# Patient Record
Sex: FEMALE | Race: WHITE | Employment: UNEMPLOYED | URBAN - METROPOLITAN AREA
[De-identification: names, ages, dates, MRNs, and addresses within clinical notes are randomized per-mention and may not be internally consistent; named-entity substitution may affect disease eponyms.]

---

## 2017-01-03 RX ORDER — GABAPENTIN 300 MG/1
CAPSULE ORAL
Qty: 270 CAP | Refills: 0 | Status: SHIPPED | OUTPATIENT
Start: 2017-01-03 | End: 2017-03-20 | Stop reason: SDUPTHER

## 2017-01-18 DIAGNOSIS — F98.8 ADD (ATTENTION DEFICIT DISORDER): ICD-10-CM

## 2017-01-18 DIAGNOSIS — G35 MS (MULTIPLE SCLEROSIS) (HCC): ICD-10-CM

## 2017-01-18 DIAGNOSIS — G89.4 CHRONIC PAIN SYNDROME: ICD-10-CM

## 2017-01-18 RX ORDER — OXYCODONE HYDROCHLORIDE 10 MG/1
10 TABLET ORAL
Qty: 60 TAB | Refills: 0 | Status: SHIPPED | OUTPATIENT
Start: 2017-01-18 | End: 2017-02-20 | Stop reason: SDUPTHER

## 2017-01-18 RX ORDER — DEXTROAMPHETAMINE SACCHARATE, AMPHETAMINE ASPARTATE, DEXTROAMPHETAMINE SULFATE AND AMPHETAMINE SULFATE 2.5; 2.5; 2.5; 2.5 MG/1; MG/1; MG/1; MG/1
10 TABLET ORAL DAILY
Qty: 30 TAB | Refills: 0 | Status: SHIPPED | OUTPATIENT
Start: 2017-01-18 | End: 2017-02-20 | Stop reason: SDUPTHER

## 2017-01-18 NOTE — TELEPHONE ENCOUNTER
Pt wants to talk to you about her RX. Pt did not give more information just say ''l want to talk with nurse\". Please.

## 2017-02-16 DIAGNOSIS — G35 MS (MULTIPLE SCLEROSIS) (HCC): ICD-10-CM

## 2017-02-16 DIAGNOSIS — F98.8 ADD (ATTENTION DEFICIT DISORDER): ICD-10-CM

## 2017-02-16 DIAGNOSIS — G89.4 CHRONIC PAIN SYNDROME: ICD-10-CM

## 2017-02-20 RX ORDER — OXYCODONE HYDROCHLORIDE 10 MG/1
10 TABLET ORAL
Qty: 60 TAB | Refills: 0 | OUTPATIENT
Start: 2017-02-20

## 2017-02-20 RX ORDER — DEXTROAMPHETAMINE SACCHARATE, AMPHETAMINE ASPARTATE, DEXTROAMPHETAMINE SULFATE AND AMPHETAMINE SULFATE 2.5; 2.5; 2.5; 2.5 MG/1; MG/1; MG/1; MG/1
10 TABLET ORAL DAILY
Qty: 30 TAB | Refills: 0 | OUTPATIENT
Start: 2017-02-20

## 2017-02-20 RX ORDER — OXYCODONE HYDROCHLORIDE 10 MG/1
10 TABLET ORAL
Qty: 60 TAB | Refills: 0 | Status: SHIPPED | OUTPATIENT
Start: 2017-02-20 | End: 2017-03-20 | Stop reason: SDUPTHER

## 2017-02-20 RX ORDER — DEXTROAMPHETAMINE SACCHARATE, AMPHETAMINE ASPARTATE, DEXTROAMPHETAMINE SULFATE AND AMPHETAMINE SULFATE 2.5; 2.5; 2.5; 2.5 MG/1; MG/1; MG/1; MG/1
10 TABLET ORAL DAILY
Qty: 30 TAB | Refills: 0 | Status: SHIPPED | OUTPATIENT
Start: 2017-02-20 | End: 2017-03-20 | Stop reason: SDUPTHER

## 2017-02-20 NOTE — TELEPHONE ENCOUNTER
Pt would like to have call back from you please retarding to her prescription for her med ( [address]) please [phone] .  Thank you

## 2017-02-21 RX ORDER — NITROFURANTOIN 25; 75 MG/1; MG/1
100 CAPSULE ORAL 2 TIMES DAILY
Qty: 14 CAP | Refills: 0 | Status: SHIPPED | OUTPATIENT
Start: 2017-02-21 | End: 2017-02-28

## 2017-03-20 ENCOUNTER — OFFICE VISIT (OUTPATIENT)
Dept: NEUROLOGY | Age: 52
End: 2017-03-20

## 2017-03-20 VITALS
SYSTOLIC BLOOD PRESSURE: 134 MMHG | OXYGEN SATURATION: 99 % | DIASTOLIC BLOOD PRESSURE: 74 MMHG | RESPIRATION RATE: 20 BRPM | HEART RATE: 90 BPM | BODY MASS INDEX: 20.18 KG/M2 | HEIGHT: 66 IN | WEIGHT: 125.6 LBS

## 2017-03-20 DIAGNOSIS — F98.8 ADD (ATTENTION DEFICIT DISORDER): ICD-10-CM

## 2017-03-20 DIAGNOSIS — G35 MS (MULTIPLE SCLEROSIS) (HCC): Primary | ICD-10-CM

## 2017-03-20 DIAGNOSIS — G89.4 CHRONIC PAIN SYNDROME: ICD-10-CM

## 2017-03-20 RX ORDER — OXYCODONE HYDROCHLORIDE 10 MG/1
10 TABLET ORAL
Qty: 60 TAB | Refills: 0 | Status: SHIPPED | OUTPATIENT
Start: 2017-03-20 | End: 2017-04-19 | Stop reason: SDUPTHER

## 2017-03-20 RX ORDER — DEXTROAMPHETAMINE SACCHARATE, AMPHETAMINE ASPARTATE, DEXTROAMPHETAMINE SULFATE AND AMPHETAMINE SULFATE 2.5; 2.5; 2.5; 2.5 MG/1; MG/1; MG/1; MG/1
10 TABLET ORAL 2 TIMES DAILY
Qty: 60 TAB | Refills: 0 | Status: SHIPPED | OUTPATIENT
Start: 2017-03-20 | End: 2017-04-19 | Stop reason: SDUPTHER

## 2017-03-20 RX ORDER — GABAPENTIN 300 MG/1
600 CAPSULE ORAL 3 TIMES DAILY
Qty: 360 CAP | Refills: 2 | Status: SHIPPED | OUTPATIENT
Start: 2017-03-20 | End: 2017-03-20 | Stop reason: SDUPTHER

## 2017-03-20 RX ORDER — AMITRIPTYLINE HYDROCHLORIDE 100 MG/1
100 TABLET, FILM COATED ORAL
Qty: 30 TAB | Refills: 3 | Status: SHIPPED | OUTPATIENT
Start: 2017-03-20 | End: 2018-05-02

## 2017-03-20 RX ORDER — GABAPENTIN 300 MG/1
600 CAPSULE ORAL 3 TIMES DAILY
Qty: 360 CAP | Refills: 2 | Status: SHIPPED | OUTPATIENT
Start: 2017-03-20 | End: 2018-05-02 | Stop reason: SDUPTHER

## 2017-03-20 NOTE — PROGRESS NOTES
Still doing well on the Adderrall- wants to know if she could take it later on in the afternoon   MS- blurred and doubled vision in her left eye, she knows it is related to stress since she moved in with her sister and her  to help manager her since she does have very advanced dementia and needed help

## 2017-03-20 NOTE — MR AVS SNAPSHOT
Visit Information Date & Time Provider Department Dept. Phone Encounter #  
 3/20/2017  3:30 PM Iris Cuevas NP Neurology [facility] [phone] [id number] Follow-up Instructions Return in about 3 months (around 6/20/2017). Upcoming Health Maintenance Date Due Pneumococcal 19-64 Medium Risk (1 of 1 - PPSV23) 3/1/1985 DTaP/Tdap/Td series (1 - Tdap) 3/1/1987 BREAST CANCER SCRN MAMMOGRAM 3/1/2016 FOBT Q 1 YEAR AGE 50-75 3/1/2016 PAP AKA CERVICAL CYTOLOGY 5/6/2016 INFLUENZA AGE 9 TO ADULT 8/1/2016 Allergies as of 3/20/2017  Review Complete On: 3/20/2017 By: Iris Cuevas NP No Known Allergies Current Immunizations  Reviewed on 6/13/2016 No immunizations on file. Not reviewed this visit You Were Diagnosed With   
  
 Codes Comments [facility]    -  Primary ICD-10-CM: G35 
ICD-9-CM: 896 ADD (attention deficit disorder)     ICD-10-CM: F98.8 ICD-9-CM: 314.00 Chronic pain syndrome     ICD-10-CM: G89.4 ICD-9-CM: 338. 4 Vitals BP Pulse Resp Height(growth percentile) Weight(growth percentile) SpO2  
 134/74 90 20 5' 6\" (1.676 m) 125 lb 9.6 oz (57 kg) 99% BMI OB Status Smoking Status 20.27 kg/m2 Having regular periods Current Every Day Smoker Vitals History BMI and BSA Data Body Mass Index Body Surface Area  
 20.27 kg/m 2 1.63 m 2 Preferred Pharmacy Pharmacy Name Phone [facility] [address], [address] [phone] Your Updated Medication List  
  
   
This list is accurate as of: 3/20/17  4:32 PM.  Always use your most recent med list.  
  
  
  
  
 amitriptyline 100 mg tablet Commonly known as:  ELAVIL Take 1 Tab by mouth nightly. dextroamphetamine-amphetamine 10 mg tablet Commonly known as:  ADDERALL Take 1 Tab (10 mg total) by mouth two (2) times a day.   Max Daily Amount: 20 mg  
  
 gabapentin 300 mg capsule Commonly known as:  NEURONTIN Take 2 Caps by mouth three (3) times daily. oxyCODONE IR 10 mg Tab immediate release tablet Commonly known as:  Van Rattler Take 1 Tab by mouth every four (4) hours as needed for Pain. Max Daily Amount: 60 mg.  
  
  
  
  
Prescriptions Printed Refills  
 dextroamphetamine-amphetamine (ADDERALL) 10 mg tablet 0 Sig: Take 1 Tab (10 mg total) by mouth two (2) times a day. Max Daily Amount: 20 mg  
 Class: Print Route: Oral  
 oxyCODONE IR (ROXICODONE) 10 mg tab immediate release tablet 0 Sig: Take 1 Tab by mouth every four (4) hours as needed for Pain. Max Daily Amount: 60 mg.  
 Class: Print Route: Oral  
 gabapentin (NEURONTIN) 300 mg capsule 2 Sig: Take 2 Caps by mouth three (3) times daily. Class: Print Route: Oral  
  
Prescriptions Sent to Pharmacy Refills  
 amitriptyline (ELAVIL) 100 mg tablet 3 Sig: Take 1 Tab by mouth nightly. Class: Normal  
 Pharmacy: [facility] [address], [address] #: [phone] Route: Oral  
  
Follow-up Instructions Return in about 3 months (around 6/20/2017). Patient Instructions A Healthy Lifestyle: Care Instructions Your Care Instructions A healthy lifestyle can help you feel good, stay at a healthy weight, and have plenty of energy for both work and play. A healthy lifestyle is something you can share with your whole family. A healthy lifestyle also can lower your risk for serious health problems, such as high blood pressure, heart disease, and diabetes. You can follow a few steps listed below to improve your health and the health of your family. Follow-up care is a key part of your treatment and safety. Be sure to make and go to all appointments, and call your doctor if you are having problems.  Its also a good idea to know your test results and keep a list of the medicines you take. How can you care for yourself at home? · Do not eat too much sugar, fat, or fast foods. You can still have dessert and treats now and then. The goal is moderation. · Start small to improve your eating habits. Pay attention to portion sizes, drink less juice and soda pop, and eat more fruits and vegetables. ¨ Eat a healthy amount of food. A 3-ounce serving of meat, for example, is about the size of a deck of cards. Fill the rest of your plate with vegetables and whole grains. ¨ Limit the amount of soda and sports drinks you have every day. Drink more water when you are thirsty. ¨ Eat at least 5 servings of fruits and vegetables every day. It may seem like a lot, but it is not hard to reach this goal. A serving or helping is 1 piece of fruit, 1 cup of vegetables, or 2 cups of leafy, raw vegetables. Have an apple or some carrot sticks as an afternoon snack instead of a candy bar. Try to have fruits and/or vegetables at every meal. 
· Make exercise part of your daily routine. You may want to start with simple activities, such as walking, bicycling, or slow swimming. Try to be active 30 to 60 minutes every day. You do not need to do all 30 to 60 minutes all at once. For example, you can exercise 3 times a day for 10 or 20 minutes. Moderate exercise is safe for most people, but it is always a good idea to talk to your doctor before starting an exercise program. 
· Keep moving. Sudha Knuckles the lawn, work in the garden, or [employer]. Take the stairs instead of the elevator at work. · If you smoke, quit. People who smoke have an increased risk for heart attack, stroke, cancer, and other lung illnesses. Quitting is hard, but there are ways to boost your chance of quitting tobacco for good. ¨ Use nicotine gum, patches, or lozenges. ¨ Ask your doctor about stop-smoking programs and medicines. ¨ Keep trying.  
In addition to reducing your risk of diseases in the future, you will notice some benefits soon after you stop using tobacco. If you have shortness of breath or asthma symptoms, they will likely get better within a few weeks after you quit. · Limit how much alcohol you drink. Moderate amounts of alcohol (up to 2 drinks a day for men, 1 drink a day for women) are okay. But drinking too much can lead to liver problems, high blood pressure, and other health problems. Family health If you have a family, there are many things you can do together to improve your health. · Eat meals together as a family as often as possible. · Eat healthy foods. This includes fruits, vegetables, lean meats and dairy, and whole grains. · Include your family in your fitness plan. Most people think of activities such as jogging or tennis as the way to fitness, but there are many ways you and your family can be more active. Anything that makes you breathe hard and gets your heart pumping is exercise. Here are some tips: 
¨ Walk to do errands or to take your child to school or the bus. ¨ Go for a family bike ride after dinner instead of watching TV. Where can you learn more? Go to http://meghanaCompufirstraul.info/. Enter Y693 in the search box to learn more about \"A Healthy Lifestyle: Care Instructions. \" Current as of: July 26, 2016 Content Version: 11.1 © 0716-4778 [employer]. Care instructions adapted under license by [employer] (which disclaims liability or warranty for this information). If you have questions about a medical condition or this instruction, always ask your healthcare professional. [place] [address] any warranty or liability for your use of this information. Introducing [facility]! Dear Deisy Valenzuela: 
Thank you for requesting a Meet My Friends account. Our records indicate that you already have an active Meet My Friends account. You can access your account anytime at [email or web address]. [email or web address] Did you know that you can access your hospital and ER discharge instructions at any time in KaChing!? You can also review all of your test results from your hospital stay or ER visit. Additional Information If you have questions, please visit the Frequently Asked Questions section of the KaChing! website at [email or web address] "[email or web address]"/[email or web address] [email or web address] Remember, KaChing! is NOT to be used for urgent needs. For medical emergencies, dial 911. Now available from your iPhone and Android! Please provide this summary of care documentation to your next provider. Your primary care clinician is listed as NONE. If you have any questions after today's visit, please call [phone].

## 2017-03-20 NOTE — PATIENT INSTRUCTIONS

## 2017-03-20 NOTE — PROGRESS NOTES
Date:  2017    Name:  Jessica Cruz  :  1965  MRN:  [id number]     PCP:  None    Chief Complaint   Patient presents with    Neurologic Problem     ADD/MS     HISTORY OF PRESENT ILLNESS: Follow up [facility]. Needs to be set up for her second Lemtrada infusion. Adderall is doing well and her daughter has really noticed a difference. She definitely notices a difference. Started to have some blurred and double vision which seemed to start yesterday. Feels like she might be in a flair but not sure if this just secondary to stress and fatigue. Current Outpatient Prescriptions   Medication Sig    dextroamphetamine-amphetamine (ADDERALL) 10 mg tablet Take 1 Tab (10 mg total) by mouth dailyEarliest Fill Date: 17. Max Daily Amount: 10 mg    oxyCODONE IR (ROXICODONE) 10 mg tab immediate release tablet Take 1 Tab by mouth every four (4) hours as needed for Pain. Max Daily Amount: 60 mg.    gabapentin (NEURONTIN) 300 mg capsule TAKE THREE CAPSULES BY MOUTH THREE TIMES A DAY    amitriptyline (ELAVIL) 100 mg tablet Take 1 Tab by mouth nightly. No current facility-administered medications for this visit. No Known Allergies  Past Medical History:   Diagnosis Date    Delivery normal     x2    Fibromyalgia     Headache(784.0)     Migraine headache     Multiple sclerosis (HCC)     Sarcoidosis (HCC)     Sun-damaged skin     Tanning bed exposure      Past Surgical History:   Procedure Laterality Date    ABDOMEN SURGERY PROC UNLISTED      galbladder removal    HX CHOLECYSTECTOMY      HX GYN      tubal ligation    HX GYN      tubial     HX OTHER SURGICAL      tubal ligation    HX TUBAL LIGATION       Social History     Social History    Marital status: SINGLE     Spouse name: N/A    Number of children: N/A    Years of education: N/A     Occupational History    Not on file.      Social History Main Topics    Smoking status: Current Every Day Smoker     Packs/day: 1.00 Years: 30.00     Types: Cigarettes    Smokeless tobacco: Never Used    Alcohol use No    Drug use: Yes     Special: Marijuana    Sexual activity: Yes     Partners: Male     Birth control/ protection: Surgical     Other Topics Concern    Not on file     Social History Narrative    ** Merged History Encounter **          Family History   Problem Relation Age of Onset    Other Mother      Lencho ojeda's disease 62    Thyroid Disease Sister     Thyroid Disease Sister     Heart Disease Father     Cancer Father      lymphoma       PHYSICAL EXAMINATION:    Visit Vitals    /74    Pulse 90    Resp 20    Ht 5' 6\" (1.676 m)    Wt 57 kg (125 lb 9.6 oz)    SpO2 99%    BMI 20.27 kg/m2     General: Well defined, nourished, and groomed individual in no acute distress.    Neck: Supple, nontender, no bruits    Heart: Regular rate and rhythm    Lungs: Clear to auscultation bilaterally    Musculoskeletal: Extremities revealed no edema and had full range of motion of joints. There is some pain with palpation with at the greater trochanteric bursa bilaterally.    Psych: Good mood and bright affect    NEUROLOGICAL EXAMINATION:    Mental Status: Alert and oriented to person, place, and time with recent and remote memory intact.    Cranial Nerves:    II, III, IV, VI: Visual acuity grossly intact. Visual fields are normal.    Pupils are equal, round, and reactive to light and accommodation.    Extra-ocular movements are full and fluid. Fundoscopic exam was benign, no ptosis or nystagmus.    V-XII: Hearing is grossly intact. Facial features are symmetric, with normal sensation and strength. The palate rises symmetrically and the tongue protrudes midline. Sternocleidomastoids 5/5.    Motor Examination: Normal tone, bulk, very trace weakness noted in the left upper extremity  Coordination: No resting or intention tremor    Gait and Station: Steady while walking.  No muscle wasting or fasiculations noted.    Reflexes: DTRs 2+ throughout. ASSESSMENT AND PLAN    ICD-10-CM ICD-9-CM    1. MS (multiple sclerosis) ([facility]) G35 340 oxyCODONE IR (ROXICODONE) 10 mg tab immediate release tablet   2. ADD (attention deficit disorder) F98.8 314.00 dextroamphetamine-amphetamine (ADDERALL) 10 mg tablet   3. Chronic pain syndrome G89.4 338.4 oxyCODONE IR (ROXICODONE) 10 mg tab immediate release tablet      gabapentin (NEURONTIN) 300 mg capsule      amitriptyline (ELAVIL) 100 mg tablet     Overall doing well. While the recent development of blurred and double vision could be related to an exacerbation, she would like to see if this resolves on its own over the next few days. If it does not, then I would like to set her up with five days of IV solumedrol. No change in her therapy. Will try to have her second round of Lemtrada set up. Follow up in three months or sooner if needed. [facility]

## 2017-04-05 ENCOUNTER — TELEPHONE (OUTPATIENT)
Dept: NEUROLOGY | Age: 52
End: 2017-04-05

## 2017-04-12 ENCOUNTER — TELEPHONE (OUTPATIENT)
Dept: NEUROLOGY | Age: 52
End: 2017-04-12

## 2017-04-19 DIAGNOSIS — F98.8 ADD (ATTENTION DEFICIT DISORDER): ICD-10-CM

## 2017-04-19 DIAGNOSIS — G35 MS (MULTIPLE SCLEROSIS) (HCC): ICD-10-CM

## 2017-04-19 DIAGNOSIS — G89.4 CHRONIC PAIN SYNDROME: ICD-10-CM

## 2017-04-19 RX ORDER — OXYCODONE HYDROCHLORIDE 10 MG/1
10 TABLET ORAL
Qty: 60 TAB | Refills: 0 | Status: SHIPPED | OUTPATIENT
Start: 2017-04-19 | End: 2017-05-17 | Stop reason: SDUPTHER

## 2017-04-19 RX ORDER — DEXTROAMPHETAMINE SACCHARATE, AMPHETAMINE ASPARTATE, DEXTROAMPHETAMINE SULFATE AND AMPHETAMINE SULFATE 2.5; 2.5; 2.5; 2.5 MG/1; MG/1; MG/1; MG/1
10 TABLET ORAL 2 TIMES DAILY
Qty: 60 TAB | Refills: 0 | Status: SHIPPED | OUTPATIENT
Start: 2017-04-19 | End: 2017-05-17 | Stop reason: SDUPTHER

## 2017-04-19 NOTE — TELEPHONE ENCOUNTER
----- Message from Boaz Gallego sent at 4/18/2017  4:36 PM EDT -----  Regarding: NP Marvin/telephone  The patient is requesting a call back from the NP in regards to lemtrada treatment.  [phone]

## 2017-04-27 ENCOUNTER — TELEPHONE (OUTPATIENT)
Dept: NEUROLOGY | Age: 52
End: 2017-04-27

## 2017-04-27 NOTE — TELEPHONE ENCOUNTER
Kim with [facility] called and she said the base line lab form is needed today or first thing tomorrow morning.

## 2017-05-01 RX ORDER — SODIUM CHLORIDE 9 MG/ML
25 INJECTION, SOLUTION INTRAVENOUS CONTINUOUS
Status: DISPENSED | OUTPATIENT
Start: 2017-05-03 | End: 2017-05-03

## 2017-05-01 RX ORDER — ACETAMINOPHEN 325 MG/1
975 TABLET ORAL ONCE
Status: COMPLETED | OUTPATIENT
Start: 2017-05-03 | End: 2017-05-03

## 2017-05-02 RX ORDER — ACETAMINOPHEN 325 MG/1
975 TABLET ORAL ONCE
Status: COMPLETED | OUTPATIENT
Start: 2017-05-04 | End: 2017-05-04

## 2017-05-02 RX ORDER — SODIUM CHLORIDE 9 MG/ML
25 INJECTION, SOLUTION INTRAVENOUS CONTINUOUS
Status: DISPENSED | OUTPATIENT
Start: 2017-05-04 | End: 2017-05-04

## 2017-05-03 ENCOUNTER — HOSPITAL ENCOUNTER (OUTPATIENT)
Dept: INFUSION THERAPY | Age: 52
Discharge: HOME | End: 2017-05-03
Payer: SELF-PAY

## 2017-05-03 VITALS
TEMPERATURE: 96 F | DIASTOLIC BLOOD PRESSURE: 57 MMHG | HEART RATE: 77 BPM | SYSTOLIC BLOOD PRESSURE: 94 MMHG | RESPIRATION RATE: 16 BRPM

## 2017-05-03 PROCEDURE — 74011250636 HC RX REV CODE- 250/636: Performed by: NURSE PRACTITIONER

## 2017-05-03 PROCEDURE — 96367 TX/PROPH/DG ADDL SEQ IV INF: CPT

## 2017-05-03 PROCEDURE — 74011250637 HC RX REV CODE- 250/637: Performed by: NURSE PRACTITIONER

## 2017-05-03 PROCEDURE — 96415 CHEMO IV INFUSION ADDL HR: CPT

## 2017-05-03 PROCEDURE — 74011000258 HC RX REV CODE- 258: Performed by: NURSE PRACTITIONER

## 2017-05-03 PROCEDURE — 96375 TX/PRO/DX INJ NEW DRUG ADDON: CPT

## 2017-05-03 PROCEDURE — 96413 CHEMO IV INFUSION 1 HR: CPT

## 2017-05-03 RX ORDER — ACETAMINOPHEN 325 MG/1
975 TABLET ORAL ONCE
Status: COMPLETED | OUTPATIENT
Start: 2017-05-05 | End: 2017-05-05

## 2017-05-03 RX ORDER — SODIUM CHLORIDE 0.9 % (FLUSH) 0.9 %
10-40 SYRINGE (ML) INJECTION AS NEEDED
Status: ACTIVE | OUTPATIENT
Start: 2017-05-03 | End: 2017-05-03

## 2017-05-03 RX ORDER — SODIUM CHLORIDE 9 MG/ML
25 INJECTION, SOLUTION INTRAVENOUS CONTINUOUS
Status: DISPENSED | OUTPATIENT
Start: 2017-05-05 | End: 2017-05-05

## 2017-05-03 RX ADMIN — ACETAMINOPHEN 975 MG: 325 TABLET ORAL at 08:32

## 2017-05-03 RX ADMIN — DIPHENHYDRAMINE HYDROCHLORIDE 50 MG: 50 INJECTION INTRAMUSCULAR; INTRAVENOUS at 09:19

## 2017-05-03 RX ADMIN — SODIUM CHLORIDE 25 ML/HR: 900 INJECTION, SOLUTION INTRAVENOUS at 08:28

## 2017-05-03 RX ADMIN — SODIUM CHLORIDE 1000 MG: 900 INJECTION, SOLUTION INTRAVENOUS at 08:36

## 2017-05-03 NOTE — PROGRESS NOTES
Outpatient Infusion Center - Chemotherapy Progress Note    0815 Pt admit to [facility] for Freddy Nash Day 1 ambulatory in stable condition with supportive daughter. Assessment completed. No new concerns voiced. PIV started in left arm with positive blood return. Line flushed, pt connected to IV fluids at [facility]. Premeds given. Visit Vitals    BP 94/57    Pulse 77    Temp 96 °F (35.6 °C)    Resp 16    Breastfeeding No       Medications:  NS @ KVO  Tylenol 975 mg PO  Solu-medrol 1000 mg IVPB  Diphenhydramine 50 mg IVPB  Lemtrada           1550 Pt tolerated treatment well. PIV maintained positive blood return throughout treatment, flushed with positive blood return at conclusion and wrapped in Coban with Curos cap applied to end clave for tomorrow's appointment. D/c home ambulatory in no distress. Pt aware of next [facility] appointment scheduled for 5/4/17.

## 2017-05-04 ENCOUNTER — HOSPITAL ENCOUNTER (OUTPATIENT)
Dept: INFUSION THERAPY | Age: 52
Discharge: HOME | End: 2017-05-04
Payer: SELF-PAY

## 2017-05-04 VITALS
SYSTOLIC BLOOD PRESSURE: 136 MMHG | DIASTOLIC BLOOD PRESSURE: 80 MMHG | TEMPERATURE: 97.5 F | OXYGEN SATURATION: 100 % | HEART RATE: 56 BPM | RESPIRATION RATE: 16 BRPM

## 2017-05-04 PROCEDURE — 96415 CHEMO IV INFUSION ADDL HR: CPT

## 2017-05-04 PROCEDURE — 96375 TX/PRO/DX INJ NEW DRUG ADDON: CPT

## 2017-05-04 PROCEDURE — 74011250637 HC RX REV CODE- 250/637: Performed by: NURSE PRACTITIONER

## 2017-05-04 PROCEDURE — 74011000258 HC RX REV CODE- 258: Performed by: NURSE PRACTITIONER

## 2017-05-04 PROCEDURE — 74011250636 HC RX REV CODE- 250/636: Performed by: NURSE PRACTITIONER

## 2017-05-04 PROCEDURE — 96413 CHEMO IV INFUSION 1 HR: CPT

## 2017-05-04 PROCEDURE — 96367 TX/PROPH/DG ADDL SEQ IV INF: CPT

## 2017-05-04 RX ORDER — SODIUM CHLORIDE 0.9 % (FLUSH) 0.9 %
10-40 SYRINGE (ML) INJECTION AS NEEDED
Status: ACTIVE | OUTPATIENT
Start: 2017-05-04 | End: 2017-05-05

## 2017-05-04 RX ORDER — SODIUM CHLORIDE 9 MG/ML
25 INJECTION, SOLUTION INTRAVENOUS AS NEEDED
Status: DISPENSED | OUTPATIENT
Start: 2017-05-04 | End: 2017-05-05

## 2017-05-04 RX ADMIN — ACETAMINOPHEN 975 MG: 325 TABLET ORAL at 08:32

## 2017-05-04 RX ADMIN — SODIUM CHLORIDE 25 ML/HR: 900 INJECTION, SOLUTION INTRAVENOUS at 08:30

## 2017-05-04 RX ADMIN — DIPHENHYDRAMINE HYDROCHLORIDE 50 MG: 50 INJECTION INTRAMUSCULAR; INTRAVENOUS at 09:23

## 2017-05-04 RX ADMIN — SODIUM CHLORIDE 1000 MG: 900 INJECTION, SOLUTION INTRAVENOUS at 08:36

## 2017-05-04 NOTE — PROGRESS NOTES
Outpatient Infusion Center - Chemotherapy Progress Note    0820 Pt admit to [facility] for Noemi Ferreira Day 2 ambulatory in stable condition with supportive daughter. Assessment completed. No new concerns voiced. PIV flushed in left arm with positive blood return, pt connected to IV fluids at [facility]. Premeds given. Visit Vitals    /65    Pulse 62    Temp 96.2 °F (35.7 °C)    Resp 16    SpO2 100%       Medications:  NS @ KVO  Tylenol 975 mg PO  Solu-medrol 1000 mg IVPB  Diphenhydramine 50 mg IVPB  Lemtrada           1530 Pt tolerated treatment well. Pt monitored post infusion for 2 hours without any s/s of reaction. PIV maintained positive blood return throughout treatment, flushed with positive blood return at conclusion and wrapped in Coban with Curos cap applied to end clave for tomorrow's appointment. D/c home ambulatory in no distress. Pt aware of next [facility] appointment scheduled for 5/5/17.

## 2017-05-05 ENCOUNTER — HOSPITAL ENCOUNTER (OUTPATIENT)
Dept: INFUSION THERAPY | Age: 52
Discharge: HOME | End: 2017-05-05
Payer: SELF-PAY

## 2017-05-05 VITALS
SYSTOLIC BLOOD PRESSURE: 140 MMHG | TEMPERATURE: 97.2 F | DIASTOLIC BLOOD PRESSURE: 75 MMHG | HEART RATE: 62 BPM | RESPIRATION RATE: 16 BRPM

## 2017-05-05 PROCEDURE — 96367 TX/PROPH/DG ADDL SEQ IV INF: CPT

## 2017-05-05 PROCEDURE — 96415 CHEMO IV INFUSION ADDL HR: CPT

## 2017-05-05 PROCEDURE — 74011250637 HC RX REV CODE- 250/637: Performed by: NURSE PRACTITIONER

## 2017-05-05 PROCEDURE — 74011000258 HC RX REV CODE- 258: Performed by: NURSE PRACTITIONER

## 2017-05-05 PROCEDURE — 96413 CHEMO IV INFUSION 1 HR: CPT

## 2017-05-05 PROCEDURE — 96375 TX/PRO/DX INJ NEW DRUG ADDON: CPT

## 2017-05-05 PROCEDURE — 74011250636 HC RX REV CODE- 250/636: Performed by: NURSE PRACTITIONER

## 2017-05-05 RX ADMIN — DIPHENHYDRAMINE HYDROCHLORIDE 50 MG: 50 INJECTION INTRAMUSCULAR; INTRAVENOUS at 09:16

## 2017-05-05 RX ADMIN — ACETAMINOPHEN 975 MG: 325 TABLET ORAL at 08:33

## 2017-05-05 RX ADMIN — SODIUM CHLORIDE 1000 MG: 900 INJECTION, SOLUTION INTRAVENOUS at 08:32

## 2017-05-05 RX ADMIN — SODIUM CHLORIDE 25 ML/HR: 900 INJECTION, SOLUTION INTRAVENOUS at 08:33

## 2017-05-05 NOTE — PROGRESS NOTES
Outpatient Infusion Center - Chemotherapy Progress Note    0825 [facility] in [facility] condition with supportive daughter. Assessment completed. No new concerns voiced. PIV flushed in left arm with positive blood return, pt connected to IV fluids at [facility]. Premeds given. Visit Vitals    /83    Pulse (!) 49    Temp 97.3 °F (36.3 °C)    Resp 16       Medications:  NS @ KVO  Tylenol 975 mg PO  Solu-medrol 1000 mg IVPB  Diphenhydramine 50 mg IVPB  Lemtrada           1530 Pt tolerated treatment well. Pt monitored post infusion for 2 hours without any s/s of reaction. PIV maintained positive blood return throughout treatment, flushed with positive blood return at conclusion and removed prior to discharge. D/c home ambulatory in no distress.

## 2017-05-11 ENCOUNTER — TELEPHONE (OUTPATIENT)
Dept: NEUROLOGY | Age: 52
End: 2017-05-11

## 2017-05-17 DIAGNOSIS — F98.8 ADD (ATTENTION DEFICIT DISORDER): ICD-10-CM

## 2017-05-17 DIAGNOSIS — G35 MS (MULTIPLE SCLEROSIS) (HCC): ICD-10-CM

## 2017-05-17 DIAGNOSIS — G89.4 CHRONIC PAIN SYNDROME: ICD-10-CM

## 2017-05-17 RX ORDER — DEXTROAMPHETAMINE SACCHARATE, AMPHETAMINE ASPARTATE, DEXTROAMPHETAMINE SULFATE AND AMPHETAMINE SULFATE 2.5; 2.5; 2.5; 2.5 MG/1; MG/1; MG/1; MG/1
10 TABLET ORAL 2 TIMES DAILY
Qty: 60 TAB | Refills: 0 | Status: SHIPPED | OUTPATIENT
Start: 2017-05-17 | End: 2017-06-14 | Stop reason: SDUPTHER

## 2017-05-17 RX ORDER — OXYCODONE HYDROCHLORIDE 10 MG/1
10 TABLET ORAL
Qty: 60 TAB | Refills: 0 | Status: SHIPPED | OUTPATIENT
Start: 2017-05-17 | End: 2017-06-14 | Stop reason: SDUPTHER

## 2017-05-17 NOTE — TELEPHONE ENCOUNTER
----- Message from Jena Watson sent at 5/17/2017  1:05 PM EDT -----  Regarding: BELLA Wong/Telephone   Pt would like a call back from Vini in regards to her medication. Pt best contact number is [phone].

## 2017-06-01 ENCOUNTER — HOSPITAL ENCOUNTER (EMERGENCY)
Age: 52
Discharge: HOME | End: 2017-06-02
Attending: EMERGENCY MEDICINE | Admitting: EMERGENCY MEDICINE
Payer: SELF-PAY

## 2017-06-01 DIAGNOSIS — M62.838 MUSCLE SPASM: ICD-10-CM

## 2017-06-01 DIAGNOSIS — G47.00 INSOMNIA, UNSPECIFIED TYPE: Primary | ICD-10-CM

## 2017-06-01 PROCEDURE — 85025 COMPLETE CBC W/AUTO DIFF WBC: CPT | Performed by: EMERGENCY MEDICINE

## 2017-06-01 PROCEDURE — 80053 COMPREHEN METABOLIC PANEL: CPT | Performed by: EMERGENCY MEDICINE

## 2017-06-01 PROCEDURE — 36415 COLL VENOUS BLD VENIPUNCTURE: CPT | Performed by: EMERGENCY MEDICINE

## 2017-06-01 PROCEDURE — 84443 ASSAY THYROID STIM HORMONE: CPT | Performed by: EMERGENCY MEDICINE

## 2017-06-01 PROCEDURE — 80307 DRUG TEST PRSMV CHEM ANLYZR: CPT | Performed by: EMERGENCY MEDICINE

## 2017-06-01 PROCEDURE — 99284 EMERGENCY DEPT VISIT MOD MDM: CPT

## 2017-06-01 PROCEDURE — 81001 URINALYSIS AUTO W/SCOPE: CPT | Performed by: EMERGENCY MEDICINE

## 2017-06-01 RX ORDER — DIAZEPAM 5 MG/1
5 TABLET ORAL
Status: COMPLETED | OUTPATIENT
Start: 2017-06-01 | End: 2017-06-02

## 2017-06-02 VITALS
SYSTOLIC BLOOD PRESSURE: 117 MMHG | HEART RATE: 73 BPM | HEIGHT: 66 IN | OXYGEN SATURATION: 97 % | BODY MASS INDEX: 19.42 KG/M2 | WEIGHT: 120.81 LBS | RESPIRATION RATE: 16 BRPM | DIASTOLIC BLOOD PRESSURE: 65 MMHG | TEMPERATURE: 98.1 F

## 2017-06-02 LAB
ALBUMIN SERPL BCP-MCNC: 4 G/DL (ref 3.5–5)
ALBUMIN/GLOB SERPL: 1.3 {RATIO} (ref 1.1–2.2)
ALP SERPL-CCNC: 108 U/L (ref 45–117)
ALT SERPL-CCNC: 17 U/L (ref 12–78)
AMPHET UR QL SCN: POSITIVE
ANION GAP BLD CALC-SCNC: 7 MMOL/L (ref 5–15)
APPEARANCE UR: CLEAR
AST SERPL W P-5'-P-CCNC: 11 U/L (ref 15–37)
BACTERIA URNS QL MICRO: NEGATIVE /HPF
BARBITURATES UR QL SCN: NEGATIVE
BASOPHILS # BLD AUTO: 0 K/UL (ref 0–0.1)
BASOPHILS # BLD: 0 % (ref 0–1)
BENZODIAZ UR QL: NEGATIVE
BILIRUB SERPL-MCNC: 0.3 MG/DL (ref 0.2–1)
BILIRUB UR QL: NEGATIVE
BUN SERPL-MCNC: 9 MG/DL (ref 6–20)
BUN/CREAT SERPL: 12 (ref 12–20)
CALCIUM SERPL-MCNC: 9 MG/DL (ref 8.5–10.1)
CANNABINOIDS UR QL SCN: POSITIVE
CHLORIDE SERPL-SCNC: 105 MMOL/L (ref 97–108)
CO2 SERPL-SCNC: 30 MMOL/L (ref 21–32)
COCAINE UR QL SCN: NEGATIVE
COLOR UR: ABNORMAL
CREAT SERPL-MCNC: 0.76 MG/DL (ref 0.55–1.02)
DIFFERENTIAL METHOD BLD: ABNORMAL
DRUG SCRN COMMENT,DRGCM: ABNORMAL
EOSINOPHIL # BLD: 0.2 K/UL (ref 0–0.4)
EOSINOPHIL NFR BLD: 3 % (ref 0–7)
EPITH CASTS URNS QL MICRO: ABNORMAL /LPF
ERYTHROCYTE [DISTWIDTH] IN BLOOD BY AUTOMATED COUNT: 18.1 % (ref 11.5–14.5)
GLOBULIN SER CALC-MCNC: 3.1 G/DL (ref 2–4)
GLUCOSE SERPL-MCNC: 90 MG/DL (ref 65–100)
GLUCOSE UR STRIP.AUTO-MCNC: NEGATIVE MG/DL
HCT VFR BLD AUTO: 37.5 % (ref 35–47)
HGB BLD-MCNC: 11.2 G/DL (ref 11.5–16)
HGB UR QL STRIP: NEGATIVE
KETONES UR QL STRIP.AUTO: NEGATIVE MG/DL
LEUKOCYTE ESTERASE UR QL STRIP.AUTO: ABNORMAL
LYMPHOCYTES # BLD AUTO: 2 % (ref 12–49)
LYMPHOCYTES # BLD: 0.1 K/UL (ref 0.8–3.5)
MCH RBC QN AUTO: 25.1 PG (ref 26–34)
MCHC RBC AUTO-ENTMCNC: 29.9 G/DL (ref 30–36.5)
MCV RBC AUTO: 83.9 FL (ref 80–99)
METHADONE UR QL: NEGATIVE
MONOCYTES # BLD: 0.6 K/UL (ref 0–1)
MONOCYTES NFR BLD AUTO: 10 % (ref 5–13)
NEUTS SEG # BLD: 5.5 K/UL (ref 1.8–8)
NEUTS SEG NFR BLD AUTO: 85 % (ref 32–75)
NITRITE UR QL STRIP.AUTO: NEGATIVE
OPIATES UR QL: NEGATIVE
PCP UR QL: NEGATIVE
PH UR STRIP: 6.5 [PH] (ref 5–8)
PLATELET # BLD AUTO: 238 K/UL (ref 150–400)
POTASSIUM SERPL-SCNC: 3.1 MMOL/L (ref 3.5–5.1)
PROT SERPL-MCNC: 7.1 G/DL (ref 6.4–8.2)
PROT UR STRIP-MCNC: NEGATIVE MG/DL
RBC # BLD AUTO: 4.47 M/UL (ref 3.8–5.2)
RBC #/AREA URNS HPF: ABNORMAL /HPF (ref 0–5)
RBC MORPH BLD: ABNORMAL
SODIUM SERPL-SCNC: 142 MMOL/L (ref 136–145)
SP GR UR REFRACTOMETRY: <1.005 (ref 1–1.03)
TSH SERPL DL<=0.05 MIU/L-ACNC: 1.66 UIU/ML (ref 0.36–3.74)
UROBILINOGEN UR QL STRIP.AUTO: 0.2 EU/DL (ref 0.2–1)
WBC # BLD AUTO: 6.4 K/UL (ref 3.6–11)
WBC URNS QL MICRO: ABNORMAL /HPF (ref 0–4)

## 2017-06-02 PROCEDURE — 74011250637 HC RX REV CODE- 250/637: Performed by: EMERGENCY MEDICINE

## 2017-06-02 RX ORDER — POTASSIUM CHLORIDE 750 MG/1
TABLET, FILM COATED, EXTENDED RELEASE ORAL
Status: DISCONTINUED
Start: 2017-06-02 | End: 2017-06-02 | Stop reason: HOSPADM

## 2017-06-02 RX ORDER — POTASSIUM CHLORIDE 750 MG/1
40 TABLET, FILM COATED, EXTENDED RELEASE ORAL
Status: COMPLETED | OUTPATIENT
Start: 2017-06-02 | End: 2017-06-02

## 2017-06-02 RX ORDER — DIAZEPAM 5 MG/1
5 TABLET ORAL
Qty: 3 TAB | Refills: 0 | Status: SHIPPED | OUTPATIENT
Start: 2017-06-02 | End: 2017-06-14

## 2017-06-02 RX ADMIN — DIAZEPAM 5 MG: 5 TABLET ORAL at 00:06

## 2017-06-02 RX ADMIN — POTASSIUM CHLORIDE 40 MEQ: 750 TABLET, FILM COATED, EXTENDED RELEASE ORAL at 01:16

## 2017-06-02 NOTE — ED NOTES
Ongoing plan of care discussed and pts questions addressed. Toileting offered and pt ambulatory to the restroom for voided specimen collection.

## 2017-06-02 NOTE — ED NOTES
The patient was discharged home by Dr. Tammy Fuchs and Vazquez Simeon rn in stable condition, accompanied by family. The patient is alert and oriented, is in no respiratory distress and has vital signs within normal limits . The patient's diagnosis, condition and treatment were explained to patient. The patient expressed understanding. Prescriptions given to pt. No work/school note given to pt. A discharge plan has been developed. A  was not involved in the process. Aftercare instructions were given to the patient. Pt's saline lock removed without complications. Family will transport pt home.

## 2017-06-02 NOTE — ED NOTES
Pt presents with complaint of insomia since Sunday. Pt has tried over the counter meds without relief. Pt has MS and has had a recent death in her family.

## 2017-06-02 NOTE — DISCHARGE INSTRUCTIONS
Insomnia: Care Instructions  Your Care Instructions  Insomnia is the inability to sleep well. It is a common problem for most people at some time. Insomnia may make it hard for you to get to sleep, stay asleep, or sleep as long as you need to. This can make you tired and grouchy during the day. It can also make you forgetful, less effective at work, and unhappy. Insomnia can be caused by conditions such as depression or anxiety. Pain can also affect your ability to sleep. When these problems are solved, the insomnia usually clears up. But sometimes bad sleep habits can cause insomnia. If insomnia is affecting your work or your enjoyment of life, you can take steps to improve your sleep. Follow-up care is a key part of your treatment and safety. Be sure to make and go to all appointments, and call your doctor if you are having problems. It's also a good idea to know your test results and keep a list of the medicines you take. How can you care for yourself at home? What to avoid  · Do not have drinks with caffeine, such as coffee or black tea, for 8 hours before bed. · Do not smoke or use other types of tobacco near bedtime. Nicotine is a stimulant and can keep you awake. · Avoid drinking alcohol late in the evening, because it can cause you to wake in the middle of the night. · Do not eat a big meal close to bedtime. If you are hungry, eat a light snack. · Do not drink a lot of water close to bedtime, because the need to urinate may wake you up during the night. · Do not read or watch TV in bed. Use the bed only for sleeping and sexual activity. What to try  · Go to bed at the same time every night, and wake up at the same time every morning. Do not take naps during the day. · Keep your bedroom quiet, dark, and cool. · Sleep on a comfortable pillow and mattress. · If watching the clock makes you anxious, turn it facing away from you so you cannot see the time.   · If you worry when you lie down, start a worry book. Well before bedtime, write down your worries, and then set the book and your concerns aside. · Try meditation or other relaxation techniques before you go to bed. · If you cannot fall asleep, get up and go to another room until you feel sleepy. Do something relaxing. Repeat your bedtime routine before you go to bed again. · Make your house quiet and calm about an hour before bedtime. Turn down the lights, turn off the TV, log off the computer, and turn down the volume on music. This can help you relax after a busy day. When should you call for help? Watch closely for changes in your health, and be sure to contact your doctor if:  · Your efforts to improve your sleep do not work. · Your insomnia gets worse. · You have been feeling down, depressed, or hopeless or have lost interest in things that you usually enjoy. Where can you learn more? Go to http://meghana-raul.info/. Enter P513 in the search box to learn more about \"Insomnia: Care Instructions. \"  Current as of: July 26, 2016  Content Version: 11.2  © 9415-2336 [employer]. Care instructions adapted under license by [facility] (which disclaims liability or warranty for this information). If you have questions about a medical condition or this instruction, always ask your healthcare professional. [address] any warranty or liability for your use of this information. We hope that we have addressed all of your medical concerns. The examination and treatment you received in the Emergency Department were for an emergent problem and were not intended as complete care. It is important that you follow up with your healthcare provider(s) for ongoing care. If your symptoms worsen or do not improve as expected, and you are unable to reach your usual health care provider(s), you should return to the Emergency Department.       Today's healthcare is undergoing tremendous change, and patient satisfaction surveys are one of the many tools to assess the quality of medical care. You may receive a survey from the [employer] regarding your experience in the Emergency Department. I hope that your experience has been completely positive, particularly the medical care that I provided. As such, please participate in the survey; anything less than excellent does not meet my expectations or intentions. [facility] and [facility] participate in nationally recognized quality of care measures. If your blood pressure is greater than 120/80, as reported below, we urge that you seek medical care to address the potential of high blood pressure, commonly known as hypertension. Hypertension can be hereditary or can be caused by certain medical conditions, pain, stress, or \"white coat syndrome. \"       Please make an appointment with your health care provider(s) for follow up of your Emergency Department visit. VITALS:   Patient Vitals for the past 8 hrs:   Temp Pulse Resp BP SpO2   06/02/17 0059 - 63 16 117/62 100 %   06/01/17 2250 98.1 °F (36.7 °C) 88 20 128/86 98 %          Thank you for allowing us to provide you with medical care today. We realize that you have many choices for your emergency care needs. Please choose us in the future for any continued health care needs. Cecilio Monson [facility], [facility].   Office: [phone]            Recent Results (from the past 24 hour(s))   CBC WITH AUTOMATED DIFF    Collection Time: 06/01/17 11:58 PM   Result Value Ref Range    WBC 6.4 3.6 - 11.0 K/uL    RBC 4.47 3.80 - 5.20 M/uL    HGB 11.2 (L) 11.5 - 16.0 g/dL    HCT 37.5 35.0 - 47.0 %    MCV 83.9 80.0 - 99.0 FL    MCH 25.1 (L) 26.0 - 34.0 PG    MCHC 29.9 (L) 30.0 - 36.5 g/dL    RDW 18.1 (H) 11.5 - 14.5 %    PLATELET 766 205 - 271 K/uL    NEUTROPHILS 85 (H) 32 - 75 %    LYMPHOCYTES 2 (L) 12 - 49 %    MONOCYTES 10 5 - 13 %    EOSINOPHILS 3 0 - 7 %    BASOPHILS 0 0 - 1 %    ABS. NEUTROPHILS 5.5 1.8 - 8.0 K/UL    ABS. LYMPHOCYTES 0.1 (L) 0.8 - 3.5 K/UL    ABS. MONOCYTES 0.6 0.0 - 1.0 K/UL    ABS. EOSINOPHILS 0.2 0.0 - 0.4 K/UL    ABS. BASOPHILS 0.0 0.0 - 0.1 K/UL    DF AUTOMATED      RBC COMMENTS HYPOCHROMIA  1+        RBC COMMENTS ANISOCYTOSIS  1+        RBC COMMENTS MICROCYTOSIS  1+        RBC COMMENTS POIKILOCYTOSIS  1+        RBC COMMENTS OVALOCYTES  PRESENT       METABOLIC PANEL, COMPREHENSIVE    Collection Time: 06/01/17 11:58 PM   Result Value Ref Range    Sodium 142 136 - 145 mmol/L    Potassium 3.1 (L) 3.5 - 5.1 mmol/L    Chloride 105 97 - 108 mmol/L    CO2 30 21 - 32 mmol/L    Anion gap 7 5 - 15 mmol/L    Glucose 90 65 - 100 mg/dL    BUN 9 6 - 20 MG/DL    Creatinine 0.76 0.55 - 1.02 MG/DL    BUN/Creatinine ratio 12 12 - 20      GFR est AA >60 >60 ml/min/1.73m2    GFR est non-AA >60 >60 ml/min/1.73m2    Calcium 9.0 8.5 - 10.1 MG/DL    Bilirubin, total 0.3 0.2 - 1.0 MG/DL    ALT (SGPT) 17 12 - 78 U/L    AST (SGOT) 11 (L) 15 - 37 U/L    Alk.  phosphatase 108 45 - 117 U/L    Protein, total 7.1 6.4 - 8.2 g/dL    Albumin 4.0 3.5 - 5.0 g/dL    Globulin 3.1 2.0 - 4.0 g/dL    A-G Ratio 1.3 1.1 - 2.2     URINALYSIS W/MICROSCOPIC    Collection Time: 06/01/17 11:58 PM   Result Value Ref Range    Color STRAW      Appearance CLEAR CLEAR      Specific gravity <1.005 1.003 - 1.030    pH (UA) 6.5 5.0 - 8.0      Protein NEGATIVE  NEG mg/dL    Glucose NEGATIVE  NEG mg/dL    Ketone NEGATIVE  NEG mg/dL    Bilirubin NEGATIVE  NEG      Blood NEGATIVE  NEG      Urobilinogen 0.2 0.2 - 1.0 EU/dL    Nitrites NEGATIVE  NEG      Leukocyte Esterase SMALL (A) NEG      WBC 5-10 0 - 4 /hpf    RBC 0-5 0 - 5 /hpf    Epithelial cells MODERATE (A) FEW /lpf    Bacteria NEGATIVE  NEG /hpf   DRUG SCREEN, URINE    Collection Time: 06/01/17 11:58 PM   Result Value Ref Range    AMPHETAMINE POSITIVE (A) NEG      BARBITURATES NEGATIVE  NEG      BENZODIAZEPINE NEGATIVE  NEG      COCAINE NEGATIVE  NEG      METHADONE NEGATIVE  NEG      OPIATES NEGATIVE  NEG      PCP(PHENCYCLIDINE) NEGATIVE  NEG      THC (TH-CANNABINOL) POSITIVE (A) NEG      Drug screen comment (NOTE)        No results found.

## 2017-06-02 NOTE — ED TRIAGE NOTES
Pt presents ambulatory to the treatment room; gait steady and unassisted. Pt rpts insomnia since Sunday night. Pt rpts 2-4 hrs Sunday. Recent stress with family member passing away and tremors from her M/S. Pt rpts current pain mgmt is not working.

## 2017-06-02 NOTE — ED PROVIDER NOTES
HPI Comments: Gladis Denis is a 48 yo F with history of MS and fibromyalgia who presents to the ED with insomnia for the past 4 days. She states that she has been under a lot of stress following the recent death of her sister and she believes that it is related to that. She states she has been having increased muscle pain and spasm that keeps her awake. She states that she does not currently have a PCP but she is followed by Mally Frost in her neurology office. She has an appointment in 3 weeks. She denies suicidal ideations. Past Medical History:   Diagnosis Date    Delivery normal     x2    Fibromyalgia     Headache     Migraine headache     Multiple sclerosis (HCC)     Sarcoidosis (HCC)     Sun-damaged skin     Tanning bed exposure        Past Surgical History:   Procedure Laterality Date    ABDOMEN SURGERY PROC UNLISTED      galbladder removal    HX CHOLECYSTECTOMY  1990    HX GYN      tubal ligation    HX GYN      tubial     HX OTHER SURGICAL      tubal ligation    HX TUBAL LIGATION  1994         Family History:   Problem Relation Age of Onset    Thyroid Disease Sister     Thyroid Disease Sister     Other Mother      Ean Boxer rusty's disease 62    Heart Disease Father     Cancer Father      lymphoma       Social History     Social History    Marital status: SINGLE     Spouse name: N/A    Number of children: N/A    Years of education: N/A     Occupational History    Not on file. Social History Main Topics    Smoking status: Current Every Day Smoker     Packs/day: 1.00     Years: 30.00     Types: Cigarettes    Smokeless tobacco: Never Used    Alcohol use No    Drug use: Yes     Special: Marijuana    Sexual activity: Yes     Partners: Male     Birth control/ protection: Surgical     Other Topics Concern    Not on file     Social History Narrative    ** Merged History Encounter **              ALLERGIES: Review of patient's allergies indicates no known allergies.     Review of Systems   Constitutional: Negative for fever. HENT: Negative for sore throat. Eyes: Negative for visual disturbance. Respiratory: Negative for cough. Cardiovascular: Negative for chest pain. Gastrointestinal: Negative for abdominal pain. Genitourinary: Negative for dysuria. Musculoskeletal: Positive for myalgias. Negative for back pain. Skin: Negative for rash. Neurological: Negative for headaches. Psychiatric/Behavioral: Positive for sleep disturbance. Negative for self-injury and suicidal ideas. The patient has insomnia. Vitals:    06/01/17 2250 06/02/17 0059 06/02/17 0143   BP: 128/86 117/62 117/65   Pulse: 88 63 73   Resp: 20 16 16   Temp: 98.1 °F (36.7 °C)     SpO2: 98% 100% 97%   Weight: 54.8 kg (120 lb 13 oz)     Height: 5' 6\" (1.676 m)              Physical Exam   Constitutional: She appears well-developed and well-nourished. No distress. HENT:   Head: Normocephalic and atraumatic. Mouth/Throat: Oropharynx is clear and moist.   Eyes: Conjunctivae and EOM are normal.   Neck: Normal range of motion and phonation normal.   Cardiovascular: Normal rate and intact distal pulses. Pulmonary/Chest: Effort normal. No respiratory distress. Abdominal: She exhibits no distension. Musculoskeletal: Normal range of motion. She exhibits no tenderness. Cervical back: She exhibits spasm. Neurological: She is alert. She is not disoriented. She exhibits normal muscle tone. Skin: Skin is warm and dry. Nursing note and vitals reviewed. [facility]  ED Course     1:32 AM  Patient reassessed and states that muscles aches improved after valium and feels more relaxed. Labs reviewed and potassium 3.1. Given 40meq KCL in ED. TSH pending. Discussed with patient who states that she will like to go home and sleep. Vital signs stable. Will discharge home.      Procedures

## 2017-06-12 ENCOUNTER — TELEPHONE (OUTPATIENT)
Dept: NEUROLOGY | Age: 52
End: 2017-06-12

## 2017-06-12 NOTE — TELEPHONE ENCOUNTER
Marin Sloan from [address] [place] [address] would like to have call back from you please regarding to pr's collection data for urinary. # [id number] option 1 ex [id number].  Thank you

## 2017-06-14 ENCOUNTER — OFFICE VISIT (OUTPATIENT)
Dept: NEUROLOGY | Age: 52
End: 2017-06-14

## 2017-06-14 VITALS
RESPIRATION RATE: 20 BRPM | HEART RATE: 82 BPM | WEIGHT: 120.4 LBS | DIASTOLIC BLOOD PRESSURE: 76 MMHG | SYSTOLIC BLOOD PRESSURE: 128 MMHG | HEIGHT: 66 IN | OXYGEN SATURATION: 96 % | BODY MASS INDEX: 19.35 KG/M2

## 2017-06-14 DIAGNOSIS — F98.8 ADD (ATTENTION DEFICIT DISORDER): ICD-10-CM

## 2017-06-14 DIAGNOSIS — G35 MS (MULTIPLE SCLEROSIS) (HCC): Primary | ICD-10-CM

## 2017-06-14 DIAGNOSIS — G89.4 CHRONIC PAIN SYNDROME: ICD-10-CM

## 2017-06-14 RX ORDER — OXYCODONE HYDROCHLORIDE 10 MG/1
10 TABLET ORAL
Qty: 60 TAB | Refills: 0 | Status: SHIPPED | OUTPATIENT
Start: 2017-06-14 | End: 2017-09-19

## 2017-06-14 RX ORDER — DEXTROAMPHETAMINE SACCHARATE, AMPHETAMINE ASPARTATE, DEXTROAMPHETAMINE SULFATE AND AMPHETAMINE SULFATE 2.5; 2.5; 2.5; 2.5 MG/1; MG/1; MG/1; MG/1
10 TABLET ORAL 2 TIMES DAILY
Qty: 60 TAB | Refills: 0 | Status: SHIPPED | OUTPATIENT
Start: 2017-06-14 | End: 2017-09-19

## 2017-06-14 NOTE — PATIENT INSTRUCTIONS

## 2017-06-14 NOTE — MR AVS SNAPSHOT
Visit Information Date & Time Provider Department Dept. Phone Encounter #  
 6/14/2017  2:30 PM BELLA [facility] [phone] [id number] Upcoming Health Maintenance Date Due Pneumococcal 19-64 Medium Risk (1 of 1 - PPSV23) 3/1/1985 DTaP/Tdap/Td series (1 - Tdap) 3/1/1987 BREAST CANCER SCRN MAMMOGRAM 3/1/2016 FOBT Q 1 YEAR AGE 50-75 3/1/2016 PAP AKA CERVICAL CYTOLOGY 5/6/2016 INFLUENZA AGE 9 TO ADULT 8/1/2017 Allergies as of 6/14/2017  Review Complete On: 6/14/2017 By: Noa Arthur NP No Known Allergies Current Immunizations  Reviewed on 5/5/2017 No immunizations on file. Not reviewed this visit You Were Diagnosed With   
  
 Codes Comments MS (multiple sclerosis) ([facility] [address]    -  Primary ICD-10-CM: G35 
ICD-9-CM: 506 ADD (attention deficit disorder)     ICD-10-CM: F98.8 ICD-9-CM: 314.00 Chronic pain syndrome     ICD-10-CM: G89.4 ICD-9-CM: 338. 4 Vitals BP Pulse Resp Height(growth percentile) Weight(growth percentile) SpO2  
 128/76 82 20 5' 6\" (1.676 m) 120 lb 6.4 oz (54.6 kg) 96% BMI OB Status Smoking Status 19.43 kg/m2 Having regular periods Current Every Day Smoker Vitals History BMI and BSA Data Body Mass Index Body Surface Area  
 19.43 kg/m 2 1.59 m 2 Preferred Pharmacy Pharmacy Name Phone [facility], [address] [phone] Your Updated Medication List  
  
   
This list is accurate as of: 6/14/17  3:34 PM.  Always use your most recent med list.  
  
  
  
  
 amitriptyline 100 mg tablet Commonly known as:  ELAVIL Take 1 Tab by mouth nightly. dextroamphetamine-amphetamine 10 mg tablet Commonly known as:  ADDERALL Take 1 Tab (10 mg total) by mouth two (2) times a day. Max Daily Amount: 20 mg  
  
 gabapentin 300 mg capsule Commonly known as:  NEURONTIN Take 2 Caps by mouth three (3) times daily. LEMTRADA 12 mg/1.2 mL injection Generic drug:  alemtuzumab  
by IntraVENous route as needed. oxyCODONE IR 10 mg Tab immediate release tablet Commonly known as:  Tayler Benders Take 1 Tab by mouth every four (4) hours as needed for Pain. Max Daily Amount: 60 mg.  
  
 valACYclovir 500 mg tablet Commonly known as:  VALTREX Take 1 Tab by mouth two (2) times a day. Prescriptions Printed Refills  
 dextroamphetamine-amphetamine (ADDERALL) 10 mg tablet 0 Sig: Take 1 Tab (10 mg total) by mouth two (2) times a day. Max Daily Amount: 20 mg  
 Class: Print Route: Oral  
 oxyCODONE IR (ROXICODONE) 10 mg tab immediate release tablet 0 Sig: Take 1 Tab by mouth every four (4) hours as needed for Pain. Max Daily Amount: 60 mg.  
 Class: Print Route: Oral  
  
We Performed the Following 10-PANEL URINE DRUG SCREEN [[id number] Custom] Patient Instructions A Healthy Lifestyle: Care Instructions Your Care Instructions A healthy lifestyle can help you feel good, stay at a healthy weight, and have plenty of energy for both work and play. A healthy lifestyle is something you can share with your whole family. A healthy lifestyle also can lower your risk for serious health problems, such as high blood pressure, heart disease, and diabetes. You can follow a few steps listed below to improve your health and the health of your family. Follow-up care is a key part of your treatment and safety. Be sure to make and go to all appointments, and call your doctor if you are having problems. Its also a good idea to know your test results and keep a list of the medicines you take. How can you care for yourself at home? · Do not eat too much sugar, fat, or fast foods. You can still have dessert and treats now and then. The goal is moderation. · Start small to improve your eating habits. Pay attention to portion sizes, drink less juice and soda pop, and eat more fruits and vegetables. ¨ Eat a healthy amount of food. A 3-ounce serving of meat, for example, is about the size of a deck of cards. Fill the rest of your plate with vegetables and whole grains. ¨ Limit the amount of soda and sports drinks you have every day. Drink more water when you are thirsty. ¨ Eat at least 5 servings of fruits and vegetables every day. It may seem like a lot, but it is not hard to reach this goal. A serving or helping is 1 piece of fruit, 1 cup of vegetables, or 2 cups of leafy, raw vegetables. Have an apple or some carrot sticks as an afternoon snack instead of a candy bar. Try to have fruits and/or vegetables at every meal. 
· Make exercise part of your daily routine. You may want to start with simple activities, such as walking, bicycling, or slow swimming. Try to be active 30 to 60 minutes every day. You do not need to do all 30 to 60 minutes all at once. For example, you can exercise 3 times a day for 10 or 20 minutes. Moderate exercise is safe for most people, but it is always a good idea to talk to your doctor before starting an exercise program. 
· Keep moving. [place] Fails the lawn, work in the garden, or Flocasts. Take the stairs instead of the elevator at work. · If you smoke, quit. People who smoke have an increased risk for heart attack, stroke, cancer, and other lung illnesses. Quitting is hard, but there are ways to boost your chance of quitting tobacco for good. ¨ Use nicotine gum, patches, or lozenges. ¨ Ask your doctor about stop-smoking programs and medicines. ¨ Keep trying. In addition to reducing your risk of diseases in the future, you will notice some benefits soon after you stop using tobacco. If you have shortness of breath or asthma symptoms, they will likely get better within a few weeks after you quit. · Limit how much alcohol you drink. Moderate amounts of alcohol (up to 2 drinks a day for men, 1 drink a day for women) are okay. But drinking too much can lead to liver problems, high blood pressure, and other health problems. Family health If you have a family, there are many things you can do together to improve your health. · Eat meals together as a family as often as possible. · Eat healthy foods. This includes fruits, vegetables, lean meats and dairy, and whole grains. · Include your family in your fitness plan. Most people think of activities such as jogging or tennis as the way to fitness, but there are many ways you and your family can be more active. Anything that makes you breathe hard and gets your heart pumping is exercise. Here are some tips: 
¨ Walk to do errands or to take your child to school or the bus. ¨ Go for a family bike ride after dinner instead of watching TV. Where can you learn more? Go to http://meghana-raul.info/. Enter N565 in the search box to learn more about \"A Healthy Lifestyle: Care Instructions. \" Current as of: July 26, 2016 Content Version: 11.2 © 4818-5718 [employer]. Care instructions adapted under license by [employer] (which disclaims liability or warranty for this information). If you have questions about a medical condition or this instruction, always ask your healthcare professional. [address] any warranty or liability for your use of this information. Introducing [facility]! Dear Louann Zavala: 
Thank you for requesting a [facility] account. Our records indicate that you already have an active [facility] account. You can access your account anytime at [email or web address] Did you know that you can access your hospital and ER discharge instructions at any time in [facility]? You can also review all of your test results from your hospital stay or ER visit. Additional Information If you have questions, please visit the Frequently Asked Questions section of the [email or web address] website at [email or web address] Remember, Gov[employer] is NOT to be used for urgent needs. For medical emergencies, dial 911. Now available from your iPhone and Android! Please provide this summary of care documentation to your next provider. Your primary care clinician is listed as NONE. If you have any questions after today's visit, please call [phone].

## 2017-06-14 NOTE — PROGRESS NOTES
She had a 4 days insomnia ER visit so she could sleep and it worked   She has been meditating to help with her stress and she feels that it helps   MS- she thinks she has been doing good, a little change in her vision but nothing really new   Her sister passed away in May with dementia     She would like to get some counseling but she doesn't know if that is available with the care card

## 2017-06-14 NOTE — PROGRESS NOTES
Date:  17     Name:  Rukhsana Quiroz  :  1965  MRN:  [id number]     PCP:  None    Chief Complaint   Patient presents with    Multiple Sclerosis     ADD     HISTORY OF PRESENT ILLNESS: Follow up RRMS. Recently went to the ER due to issues with insomnia. She was given valium for this and notes that this did help. She was only given a couple of these and really has been better since that time. Her sleep is much better at this point and no longer an issue. While in the ER, she did have a urine drug screen which demonstrated she did have THC in her system though she insists that she has not used marijuana since she signed the pain contract. Her children do have friends that will bring in the marijuana brownies and states that it is possible that she may have ingested one of these without knowing about the contents. Also, she admits that it is possible that she might have smoked some to put her to sleep without remembering due to the severity of the sleep deprivation. States that she had taken something for an upset stomach that a friend gave her, Prilosec. Takes the Adderall in the morning and cut it out in the afternoon. This does help significantly with her energy. Joints are a little achy since she had her Lemtrada infusion May 3, , . This occurred with her previous infusion as well. She has been using the NSAIDs for this. Except as noted above, denies  fever, chills, cough. No CP or SOB. No dysuria, loss of bowel or bladder control. No Weight loss. Appetite good. Sleeping well. No sweats. No edema. No bruising or bleeding. No nausea or vomit. No diarrhea. No frequency, urgency, No depressive sxs. No anxiety. Denies sore throat, nasal congestion, nasal discharge, epistaxis, tinnitus, hearing loss, back pain, muscle pain, or joint pain. Current Outpatient Prescriptions   Medication Sig    alemtuzumab (LEMTRADA) 12 mg/1.2 mL injection by IntraVENous route as needed.     dextroamphetamine-amphetamine (ADDERALL) 10 mg tablet Take 1 Tab (10 mg total) by mouth two (2) times a dayEarliest Fill Date: 5/17/17. Max Daily Amount: 20 mg    oxyCODONE IR (ROXICODONE) 10 mg tab immediate release tablet Take 1 Tab by mouth every four (4) hours as needed for Pain. Max Daily Amount: 60 mg.    valACYclovir (VALTREX) 500 mg tablet Take 1 Tab by mouth two (2) times a day.  amitriptyline (ELAVIL) 100 mg tablet Take 1 Tab by mouth nightly.  gabapentin (NEURONTIN) 300 mg capsule Take 2 Caps by mouth three (3) times daily. No current facility-administered medications for this visit. No Known Allergies  Past Medical History:   Diagnosis Date    Delivery normal     x2    Fibromyalgia     Headache     Migraine headache     Multiple sclerosis (HCC)     Sarcoidosis (HCC)     Sun-damaged skin     Tanning bed exposure      Past Surgical History:   Procedure Laterality Date    ABDOMEN SURGERY PROC UNLISTED      galbladder removal    HX CHOLECYSTECTOMY  1990    HX GYN      tubal ligation    HX GYN      tubial     HX OTHER SURGICAL      tubal ligation    HX TUBAL LIGATION  1994     Social History     Social History    Marital status: SINGLE     Spouse name: N/A    Number of children: N/A    Years of education: N/A     Occupational History    Not on file.      Social History Main Topics    Smoking status: Current Every Day Smoker     Packs/day: 1.00     Years: 30.00     Types: Cigarettes    Smokeless tobacco: Never Used    Alcohol use No    Drug use: Yes     Special: Marijuana    Sexual activity: Yes     Partners: Male     Birth control/ protection: Surgical     Other Topics Concern    Not on file     Social History Narrative    ** Merged History Encounter **          Family History   Problem Relation Age of Onset    Thyroid Disease Sister     Thyroid Disease Sister     Other Mother      Mariettajamil Ventura rusty's disease 62    Heart Disease Father     Cancer Father      lymphoma PHYSICAL EXAMINATION:    Visit Vitals    /76    Pulse 82    Resp 20    Ht 5' 6\" (1.676 m)    Wt 54.6 kg (120 lb 6.4 oz)    SpO2 96%    BMI 19.43 kg/m2     General: Well defined, nourished, and groomed individual in no acute distress.    Neck: Supple, nontender, no bruits    Heart: Regular rate and rhythm    Lungs: Clear to auscultation bilaterally    Musculoskeletal: Extremities revealed no edema and had full range of motion of joints. There is some pain with palpation with at the greater trochanteric bursa bilaterally.    Psych: Good mood and bright affect    NEUROLOGICAL EXAMINATION:    Mental Status: Alert and oriented to person, place, and time with recent and remote memory intact.    Cranial Nerves:    II, III, IV, VI: Visual acuity grossly intact. Visual fields are normal.    Pupils are equal, round, and reactive to light and accommodation.    Extra-ocular movements are full and fluid. Fundoscopic exam was benign, no ptosis or nystagmus.    V-XII: Hearing is grossly intact. Facial features are symmetric, with normal sensation and strength. The palate rises symmetrically and the tongue protrudes midline. Sternocleidomastoids 5/5.    Motor Examination: Normal tone, bulk, very trace weakness noted in the left upper extremity  Coordination: No resting or intention tremor    Gait and Station: Steady while walking. No muscle wasting or fasiculations noted.    Reflexes: DTRs 2+ throughout. ASSESSMENT AND PLAN    ICD-10-CM ICD-9-CM    1. MS (multiple sclerosis) ([facility]) G35 340 oxyCODONE IR (ROXICODONE) 10 mg tab immediate release tablet   2. ADD (attention deficit disorder) F98.8 314.00 dextroamphetamine-amphetamine (ADDERALL) 10 mg tablet   3. Chronic pain syndrome G89.4 338.4 oxyCODONE IR (ROXICODONE) 10 mg tab immediate release tablet      10-PANEL URINE DRUG SCREEN     Second Lemtrada infusion went well. She has not had any significant issues with this.   She did have a bout in which she was severely sleep deprived and ended up going to the emergency department for this she had not slept in several days. While in the emergency room, she did have a urine drug screen that revealed evidence for marijuana use. As she does swear that she does not recall using it recently and that she has not touched it since she signed the pain contract in September, we will repeat the urine drug screen in case this is a false positive. Medications were refilled today. We will plan for reimaging at 6 months. She will follow-up in 3 months or sooner if needed. Myriam Figueroa

## 2017-06-20 LAB
AMPHETAMINES UR QL SCN: NEGATIVE NG/ML
BARBITURATES UR QL SCN: NEGATIVE NG/ML
BENZODIAZ UR QL: NEGATIVE NG/ML
BZE UR QL: NEGATIVE NG/ML
CANNABINOIDS UR QL CFM: POSITIVE
MDMA UR QL SCN: NEGATIVE NG/ML
METHADONE UR QL SCN: NEGATIVE NG/ML
METHAQUALONE UR QL: NEGATIVE NG/ML
OPIATES UR QL: NEGATIVE NG/ML
PCP UR QL: NEGATIVE NG/ML
PROPOXYPH UR QL: NEGATIVE NG/ML

## 2017-07-05 ENCOUNTER — TELEPHONE (OUTPATIENT)
Dept: NEUROLOGY | Age: 52
End: 2017-07-05

## 2017-07-05 NOTE — TELEPHONE ENCOUNTER
Let her know that her UDS was positive for marijuana. She will need to have a negative screen before I will give her any more of her pain medication and she will need to be checked more regularly with UDS moving forward. Per NP. Left Message - on home/mobile number listed letting her know I was calling and asking for a call back.

## 2017-07-13 ENCOUNTER — TELEPHONE (OUTPATIENT)
Dept: NEUROLOGY | Age: 52
End: 2017-07-13

## 2017-07-13 NOTE — TELEPHONE ENCOUNTER
----- Message from Abhijit Hamilton sent at 7/13/2017 11:28 AM EDT -----  Regarding: BELLA Wong/Telephone  Pt is returning a call from nurse and states she has had two migraines this week with throwing up. Best contact number is [phone] and states to leave a detailed message if no answer.

## 2017-09-19 ENCOUNTER — OFFICE VISIT (OUTPATIENT)
Dept: NEUROLOGY | Age: 52
End: 2017-09-19

## 2017-09-19 ENCOUNTER — TELEPHONE (OUTPATIENT)
Dept: NEUROLOGY | Age: 52
End: 2017-09-19

## 2017-09-19 VITALS
SYSTOLIC BLOOD PRESSURE: 126 MMHG | WEIGHT: 122.7 LBS | BODY MASS INDEX: 19.8 KG/M2 | HEART RATE: 99 BPM | OXYGEN SATURATION: 95 % | DIASTOLIC BLOOD PRESSURE: 76 MMHG

## 2017-09-19 DIAGNOSIS — K29.70 GASTRITIS WITHOUT BLEEDING, UNSPECIFIED CHRONICITY, UNSPECIFIED GASTRITIS TYPE: ICD-10-CM

## 2017-09-19 DIAGNOSIS — F51.01 PRIMARY INSOMNIA: ICD-10-CM

## 2017-09-19 DIAGNOSIS — F98.8 ADD (ATTENTION DEFICIT DISORDER): ICD-10-CM

## 2017-09-19 DIAGNOSIS — D50.8 IRON DEFICIENCY ANEMIA SECONDARY TO INADEQUATE DIETARY IRON INTAKE: ICD-10-CM

## 2017-09-19 DIAGNOSIS — G25.81 RESTLESS LEG SYNDROME: ICD-10-CM

## 2017-09-19 DIAGNOSIS — G35 MS (MULTIPLE SCLEROSIS) (HCC): Primary | ICD-10-CM

## 2017-09-19 DIAGNOSIS — Z72.0 TOBACCO ABUSE DISORDER: ICD-10-CM

## 2017-09-19 RX ORDER — METHYLPREDNISOLONE SODIUM SUCCINATE 1 G/16ML
1000 INJECTION, POWDER, LYOPHILIZED, FOR SOLUTION INTRAMUSCULAR; INTRAVENOUS DAILY
Qty: 3000 MG | Refills: 0 | Status: SHIPPED | OUTPATIENT
Start: 2017-09-19 | End: 2017-09-22

## 2017-09-19 RX ORDER — DEXTROAMPHETAMINE SACCHARATE, AMPHETAMINE ASPARTATE, DEXTROAMPHETAMINE SULFATE AND AMPHETAMINE SULFATE 2.5; 2.5; 2.5; 2.5 MG/1; MG/1; MG/1; MG/1
10 TABLET ORAL 2 TIMES DAILY
Qty: 60 TAB | Refills: 0 | Status: SHIPPED | OUTPATIENT
Start: 2017-09-19 | End: 2017-12-19 | Stop reason: SDUPTHER

## 2017-09-19 RX ORDER — VALACYCLOVIR HYDROCHLORIDE 500 MG/1
500 TABLET, FILM COATED ORAL 2 TIMES DAILY
Qty: 60 TAB | Refills: 2 | Status: SHIPPED | OUTPATIENT
Start: 2017-09-19 | End: 2018-05-02 | Stop reason: SDUPTHER

## 2017-09-19 NOTE — PATIENT INSTRUCTIONS
[facility]   Statement to Patients  April 1, 2014      In an effort to ensure the large volume of patient prescription refills is processed in the most efficient and expeditious manner, we are asking our patients to assist us by calling your Pharmacy for all prescription refills, this will include also your  Mail Order Pharmacy. The pharmacy will contact our office electronically to continue the refill process. Please do not wait until the last minute to call your pharmacy. We need at least 48 hours (2days) to fill prescriptions. We also encourage you to call your pharmacy before going to  your prescription to make sure it is ready. With regard to controlled substance prescription refill requests (narcotic refills) that need to be picked up at our office, we ask your cooperation by providing us with at least 72 hours (3days) notice that you will need a refill. We will not refill narcotic prescription refill requests after 4:00pm on any weekday, Monday through Thursday, or after 2:00pm on Fridays, or on the weekends. We encourage everyone to explore another way of getting your prescription refill request processed using [employer], our patient web portal through our electronic medical record system. [employer] is an efficient and effective way to communicate your medication request directly to the office and  downloadable as an camille on your smart phone . [employer] also features a review functionality that allows you to view your medication list as well as leave messages for your physician. Are you ready to get connected? If so please review the attatched instructions or speak to any of our staff to get you set up right away! Thank you so much for your cooperation. Should you have any questions please contact our Practice Administrator.     The Physicians and Staff,  [facility]          Tenosynovitis of the Wrist: Care Instructions  Your Care Instructions  Tenosynovitis (say \"ten-oh-sin-uh-VY-tus\") means the lining of a tendon is inflamed. This problem usually affects tendons in your thumb and wrist. A tendon is a cord that joins muscle to bone. Tenosynovitis can be caused by an injury. Or it may be caused by repeating a movement over and over, such as when you knit, lift things, or play video games. In rare cases, an infected wound causes it. In most cases, you can recover fully. But if the problem is caused by doing something over and over and you don't stop or change doing that, it may come back. Follow-up care is a key part of your treatment and safety. Be sure to make and go to all appointments, and call your doctor if you are having problems. It's also a good idea to know your test results and keep a list of the medicines you take. How can you care for yourself at home? · Prop up the sore wrist on a pillow when you ice it or anytime you sit or lie down during the next 3 days. Try to keep it above the level of your heart. This will help reduce swelling. · Put ice or cold packs on your wrist for 10 to 20 minutes at a time. Try to do this every 1 to 2 hours for the next 3 days (when you are awake) or until the swelling goes down. Put a thin cloth between the ice pack and your skin. · If your swelling is gone after 2 or 3 days, put a heating pad set on low or a warm cloth on your wrist for 15 to 20 minutes. This can reduce pain. · If your doctor gave you an elastic bandage, keep it on for the next 24 to 36 hours or for as long as your doctor told you. The bandage should be snug. But it should not be tight enough to cause numbness, tingling, or swelling. · If your doctor gave you a splint or brace, wear it as directed. It will protect your wrist until it is better. · Take pain medicines exactly as directed. ¨ If the doctor gave you a prescription medicine for pain, take it as prescribed.   ¨ If you are not taking a prescription pain medicine, ask your doctor if you can take an over-the-counter medicine. · If your doctor prescribes antibiotics, take them as directed. Do not stop taking them just because you feel better. You need to take the full course of antibiotics. · Try not to use your injured wrist and hand. · After you are better, do exercises to make the muscles around your tendon stronger. This can prevent the problem from coming back. Follow instructions from your doctor. When should you call for help? Call your doctor now or seek immediate medical care if:  · Your hand or fingers are cool or pale or change colors. · You have tingling, weakness, or numbness in your hand and fingers. · Your pain gets worse. · The tendon may be infected. Signs of infection include:  ¨ Increased pain and tenderness around the wrist or thumb. ¨ Swelling or redness of the wrist or thumb. ¨ A fever. Watch closely for changes in your health, and be sure to contact your doctor if:  · You do not get better as expected. Where can you learn more? Go to http://meghana-raul.info/. Enter P522 in the search box to learn more about \"Tenosynovitis of the Wrist: Care Instructions. \"  Current as of: March 21, 2017  Content Version: 11.3  © 4970-8709 [employer]. Care instructions adapted under license by [employer] (which disclaims liability or warranty for this information). If you have questions about a medical condition or this instruction, always ask your healthcare professional. [place] [address] any warranty or liability for your use of this information. Restless Legs Syndrome: Care Instructions  Your Care Instructions  Restless legs syndrome is a common nervous system problem. People with this syndrome feel a creeping, achy, or unpleasant feeling in the legs and an overpowering urge to move them.  It often occurs in the evening and at night and can lead to sleep problems and tiredness. Your doctor may suggest doing a study of your sleep patterns to figure out what is happening when you try to sleep. Many people get relief from symptoms when they get regular exercise, eat well, and avoid caffeine, alcohol, and tobacco.  Follow-up care is a key part of your treatment and safety. Be sure to make and go to all appointments, and call your doctor if you are having problems. It's also a good idea to know your test results and keep a list of the medicines you take. How can you care for yourself at home? · Take your medicines exactly as prescribed. Call your doctor if you think you are having a problem with your medicine. · Try bathing in hot or cold water. Applying a heating pad or ice bag to your legs may also help symptoms. · Stretch and massage your legs before bed or when discomfort begins. · Get some exercise for at least 30 minutes a day on most days of the week. Stop exercising at least 3 hours before bedtime. · Try to plan for situations where you will need to remain seated for long stretches. For example, if you are traveling by car, plan some stops so you can get out and walk around. · Tell your doctor about any medicines you are taking. This includes all over-the-counter, prescription, and herbal medicines. Some medicines, such as antidepressants, antihistamines, and cold and sinus medicines, can make your symptoms worse. · Avoid caffeine products, such as coffee, tea, cola, and chocolate. Caffeine can interrupt your sleep and stimulate you. · Do not smoke. Nicotine can make restless legs worse. If you need help quitting, talk to your doctor about stop-smoking programs and medicines. These can increase your chances of quitting for good. · Do not drink alcohol late in the evening. Take steps to help you sleep better  · Get plenty of sunlight in the outdoors, particularly later in the afternoon. · Use the evening hours for settling down.  Avoid activities that challenge you in the hours before bedtime. · Eat meals at regular times, and do not snack before bedtime. · Keep your bedroom quiet, dark, and cool. Try using a sleep mask and earplugs to help you sleep. · Limit how much you drink at night to reduce your need to get up to urinate. But do not go to bed thirsty. · Run a fan or other steady \"white noise\" during the night if noises wake you up. · Hellier the bed for sleeping and sex. Do your reading or TV watching in another room. · Once you are in bed, relax from head to toe, and guide your mind to pleasant thoughts. · Do not stay in bed longer than 8 hours, and try to avoid naps. · If your symptoms usually improve around 4 a.m. to 6 a.m., try going to bed later than usual or allowing extra time for sleeping in to help you get the rest you need. When should you call for help? Watch closely for changes in your health, and be sure to contact your doctor if:  · You are still not getting enough sleep. · Your symptoms become more severe or happen more often. Where can you learn more? Go to http://meghana-raul.info/. Enter N394 in the search box to learn more about \"Restless Legs Syndrome: Care Instructions. \"  Current as of: October 14, 2016  Content Version: 11.3  © 6830-4277 [employer]. Care instructions adapted under license by [employer] (which disclaims liability or warranty for this information). If you have questions about a medical condition or this instruction, always ask your healthcare professional. [place] [address] any warranty or liability for your use of this information.

## 2017-09-19 NOTE — PROGRESS NOTES
Date:  17     Name:  Hilma Collet  :  1965  MRN:  [id number]     PCP:  None    Chief Complaint   Patient presents with    Multiple Sclerosis     HISTORY OF PRESENT ILLNESS: Follow up RRMS. Over the last 10 days, she has noticed an increase in the joint pain. She has also noticed a an increase in the right eye dryness, itchiness. She also has had issues with RLS which is ongoing. Except as noted above, denies  fever, chills, cough. No CP or SOB. No dysuria, loss of bowel or bladder control. No Weight loss. Appetite good. Sleeping well. No sweats. No edema. No bruising or bleeding. No nausea or vomit. No diarrhea. No frequency, urgency, No depressive sxs. No anxiety. Denies sore throat, nasal congestion, nasal discharge, epistaxis, tinnitus, hearing loss, back pain, muscle pain, or joint pain. Current Outpatient Prescriptions   Medication Sig    alemtuzumab (LEMTRADA) 12 mg/1.2 mL injection by IntraVENous route as needed.  amitriptyline (ELAVIL) 100 mg tablet Take 1 Tab by mouth nightly.  gabapentin (NEURONTIN) 300 mg capsule Take 2 Caps by mouth three (3) times daily.  dextroamphetamine-amphetamine (ADDERALL) 10 mg tablet Take 1 Tab (10 mg total) by mouth two (2) times a day. Max Daily Amount: 20 mg    oxyCODONE IR (ROXICODONE) 10 mg tab immediate release tablet Take 1 Tab by mouth every four (4) hours as needed for Pain. Max Daily Amount: 60 mg.    valACYclovir (VALTREX) 500 mg tablet Take 1 Tab by mouth two (2) times a day. No current facility-administered medications for this visit.       No Known Allergies  Past Medical History:   Diagnosis Date    Delivery normal     x2    Fibromyalgia     Headache     Migraine headache     Multiple sclerosis (HCC)     Sarcoidosis ([facility] [address]     Sun-damaged skin     Tanning bed exposure      Past Surgical History:   Procedure Laterality Date    ABDOMEN SURGERY PROC UNLISTED      galbladder removal    HX CHOLECYSTECTOMY  1990    HX GYN      tubal ligation    HX GYN      tubial     HX OTHER SURGICAL      tubal ligation    HX TUBAL LIGATION  1994     Social History     Social History    Marital status: SINGLE     Spouse name: N/A    Number of children: N/A    Years of education: N/A     Occupational History    Not on file. Social History Main Topics    Smoking status: Current Every Day Smoker     Packs/day: 1.00     Years: 30.00     Types: Cigarettes    Smokeless tobacco: Never Used    Alcohol use No    Drug use: Yes     Special: Marijuana    Sexual activity: Yes     Partners: Male     Birth control/ protection: Surgical     Other Topics Concern    Not on file     Social History Narrative    ** Merged History Encounter **          Family History   Problem Relation Age of Onset    Thyroid Disease Sister     Thyroid Disease Sister     Other Mother      Sydna Holding rusty's disease 62    Heart Disease Father     Cancer Father      lymphoma       PHYSICAL EXAMINATION:    Visit Vitals    /76    Pulse 99    Wt 55.7 kg (122 lb 11.2 oz)    SpO2 95%    BMI 19.8 kg/m2     General: Well defined, nourished, and groomed individual in no acute distress.    Neck: Supple, nontender, no bruits    Heart: Regular rate and rhythm    Lungs: Clear to auscultation bilaterally    Musculoskeletal: Extremities revealed no edema and had full range of motion of joints. There is some pain with palpation with at the greater trochanteric bursa bilaterally.    Psych: Good mood and bright affect    NEUROLOGICAL EXAMINATION:    Mental Status: Alert and oriented to person, place, and time with recent and remote memory intact.    Cranial Nerves:    II, III, IV, VI: Visual acuity grossly intact. Visual fields are normal.    Pupils are equal, round, and reactive to light and accommodation.    Extra-ocular movements are full and fluid. Fundoscopic exam was benign, no ptosis or nystagmus.    V-XII: Hearing is grossly intact.  Facial features are symmetric, with normal sensation and strength. The palate rises symmetrically and the tongue protrudes midline. Sternocleidomastoids 5/5.    Motor Examination: Normal tone, bulk, very trace weakness noted in the left upper extremity  Coordination: No resting or intention tremor    Gait and Station: Steady while walking. No muscle wasting or fasiculations noted.    Reflexes: DTRs 2+ throughout. ASSESSMENT AND PLAN    ICD-10-CM ICD-9-CM    1. MS (multiple sclerosis) ([facility]) G35 340 valACYclovir (VALTREX) 500 mg tablet   2. Iron deficiency anemia secondary to inadequate dietary iron intake D50.8 280.1    3. Restless leg syndrome G25.81 333.94    4. Gastritis without bleeding, unspecified chronicity, unspecified gastritis type K29.70 535.50    5. Tobacco abuse disorder Z72.0 305.1    6. Primary insomnia F51.01 307.42    7. ADD (attention deficit disorder) F98.8 314.00 dextroamphetamine-amphetamine (ADDERALL) 10 mg tablet     Overall, the relapsing remitting multiple sclerosis is actually fairly stable. I would like her to continue taking the Valtrex for now as her CD4 and lymphocyte counts are still fairly low following her Lemtrada infusions. She did have some complaints of some right eye dryness and itchiness along with some blurred vision as well as some increasing joint pain. She was sent for IV Solu-Medrol for possible MS exacerbation. For the ADD and MS-related fatigue, the Adderall was refilled today. Most of her complaints seem related to restless leg syndrome and her significant gastritis. I suspect that the RLS is related to her tobacco use, artificial sweeteners, and even more significantly her iron deficiency anemia. She was again encouraged to discontinue smoking and to cut out the artificial sweeteners. I continue to recommend oral iron supplementation. Unfortunately, she does have some issues with the iron causing some nausea.   As such, I would recommend she start with a lower dose iron at about 50 mg daily increasing this weekly by 50 mg to a total of 150 mg daily. She should try to take this with a glass of orange juice as this will help with the absorption. Follow-up in 3 months    [address]

## 2017-09-19 NOTE — MR AVS SNAPSHOT
Visit Information Date & Time Provider Department Dept. Phone Encounter #  
 9/19/2017  1:00 PM Cande Medellin NP [facility] [phone] [id number] Follow-up Instructions Return in about 3 months (around 12/19/2017). Upcoming Health Maintenance Date Due Pneumococcal 19-64 Medium Risk (1 of 1 - PPSV23) 3/1/1985 DTaP/Tdap/Td series (1 - Tdap) 3/1/1987 BREAST CANCER SCRN MAMMOGRAM 3/1/2016 FOBT Q 1 YEAR AGE 50-75 3/1/2016 PAP AKA CERVICAL CYTOLOGY 5/6/2016 INFLUENZA AGE 9 TO ADULT 8/1/2017 Allergies as of 9/19/2017  Review Complete On: 9/19/2017 By: Claribel Hunter LPN No Known Allergies Current Immunizations  Reviewed on 5/5/2017 No immunizations on file. Not reviewed this visit You Were Diagnosed With   
  
 Codes Comments MS (multiple sclerosis) ([facility] [address]    -  Primary ICD-10-CM: G35 
ICD-9-CM: 539 Iron deficiency anemia secondary to inadequate dietary iron intake     ICD-10-CM: D50.8 ICD-9-CM: 280.1 Restless leg syndrome     ICD-10-CM: G25.81 ICD-9-CM: 333.94 Gastritis without bleeding, unspecified chronicity, unspecified gastritis type     ICD-10-CM: K29.70 ICD-9-CM: 535.50 Tobacco abuse disorder     ICD-10-CM: Z72.0 ICD-9-CM: 305.1 Primary insomnia     ICD-10-CM: F51.01 
ICD-9-CM: 307.42   
 ADD (attention deficit disorder)     ICD-10-CM: F98.8 ICD-9-CM: 314.00 Vitals BP Pulse Weight(growth percentile) SpO2 BMI OB Status 126/76 99 122 lb 11.2 oz (55.7 kg) 95% 19.8 kg/m2 Having regular periods Smoking Status Current Every Day Smoker BMI and BSA Data Body Mass Index Body Surface Area  
 19.8 kg/m 2 1.61 m 2 Preferred Pharmacy Pharmacy Name Phone [facility], [address] [phone] Your Updated Medication List  
  
   
 This list is accurate as of: 9/19/17  2:44 PM.  Always use your most recent med list.  
  
  
  
  
 amitriptyline 100 mg tablet Commonly known as:  ELAVIL Take 1 Tab by mouth nightly. dextroamphetamine-amphetamine 10 mg tablet Commonly known as:  ADDERALL Take 1 Tab (10 mg total) by mouth two (2) times a day. Max Daily Amount: 20 mg  
  
 gabapentin 300 mg capsule Commonly known as:  NEURONTIN Take 2 Caps by mouth three (3) times daily. valACYclovir 500 mg tablet Commonly known as:  VALTREX Take 1 Tab by mouth two (2) times a day. Prescriptions Printed Refills  
 dextroamphetamine-amphetamine (ADDERALL) 10 mg tablet 0 Sig: Take 1 Tab (10 mg total) by mouth two (2) times a day. Max Daily Amount: 20 mg  
 Class: Print Route: Oral  
  
Prescriptions Sent to Pharmacy Refills  
 valACYclovir (VALTREX) 500 mg tablet 2 Sig: Take 1 Tab by mouth two (2) times a day. Class: Normal  
 Pharmacy: [facility] #: [phone] Route: Oral  
  
Follow-up Instructions Return in about 3 months (around 12/19/2017). Patient Instructions PRESCRIPTION REFILL POLICY [facility] Statement to Patients April 1, 2014 In an effort to ensure the large volume of patient prescription refills is processed in the most efficient and expeditious manner, we are asking our patients to assist us by calling your Pharmacy for all prescription refills, this will include also your  Mail Order Pharmacy. The pharmacy will contact our office electronically to continue the refill process. Please do not wait until the last minute to call your pharmacy. We need at least 48 hours (2days) to fill prescriptions. We also encourage you to call your pharmacy before going to  your prescription to make sure it is ready. With regard to controlled substance prescription refill requests (narcotic refills) that need to be picked up at our office, we ask your cooperation by providing us with at least 72 hours (3days) notice that you will need a refill. We will not refill narcotic prescription refill requests after 4:00pm on any weekday, Monday through Thursday, or after 2:00pm on Fridays, or on the weekends. We encourage everyone to explore another way of getting your prescription refill request processed using [employer], our patient web portal through our electronic medical record system. [employer] is an efficient and effective way to communicate your medication request directly to the office and  downloadable as an camille on your smart phone . [employer] also features a review functionality that allows you to view your medication list as well as leave messages for your physician. Are you ready to get connected? If so please review the attatched instructions or speak to any of our staff to get you set up right away! Thank you so much for your cooperation. Should you have any questions please contact our Practice Administrator. The Physicians and Staff,  [facility] Tenosynovitis of the Wrist: Care Instructions Your Care Instructions Tenosynovitis (say \"ten-oh-sin-uh-VY-tus\") means the lining of a tendon is inflamed. This problem usually affects tendons in your thumb and wrist. A tendon is a cord that joins muscle to bone. Tenosynovitis can be caused by an injury. Or it may be caused by repeating a movement over and over, such as when you knit, lift things, or play video games. In rare cases, an infected wound causes it. In most cases, you can recover fully. But if the problem is caused by doing something over and over and you don't stop or change doing that, it may come back. Follow-up care is a key part of your treatment and safety.  Be sure to make and go to all appointments, and call your doctor if you are having problems. It's also a good idea to know your test results and keep a list of the medicines you take. How can you care for yourself at home? · Prop up the sore wrist on a pillow when you ice it or anytime you sit or lie down during the next 3 days. Try to keep it above the level of your heart. This will help reduce swelling. · Put ice or cold packs on your wrist for 10 to 20 minutes at a time. Try to do this every 1 to 2 hours for the next 3 days (when you are awake) or until the swelling goes down. Put a thin cloth between the ice pack and your skin. · If your swelling is gone after 2 or 3 days, put a heating pad set on low or a warm cloth on your wrist for 15 to 20 minutes. This can reduce pain. · If your doctor gave you an elastic bandage, keep it on for the next 24 to 36 hours or for as long as your doctor told you. The bandage should be snug. But it should not be tight enough to cause numbness, tingling, or swelling. · If your doctor gave you a splint or brace, wear it as directed. It will protect your wrist until it is better. · Take pain medicines exactly as directed. ¨ If the doctor gave you a prescription medicine for pain, take it as prescribed. ¨ If you are not taking a prescription pain medicine, ask your doctor if you can take an over-the-counter medicine. · If your doctor prescribes antibiotics, take them as directed. Do not stop taking them just because you feel better. You need to take the full course of antibiotics. · Try not to use your injured wrist and hand. · After you are better, do exercises to make the muscles around your tendon stronger. This can prevent the problem from coming back. Follow instructions from your doctor. When should you call for help? Call your doctor now or seek immediate medical care if: 
· Your hand or fingers are cool or pale or change colors. · You have tingling, weakness, or numbness in your hand and fingers. · Your pain gets worse. · The tendon may be infected. Signs of infection include: 
¨ Increased pain and tenderness around the wrist or thumb. ¨ Swelling or redness of the wrist or thumb. ¨ A fever. Watch closely for changes in your health, and be sure to contact your doctor if: 
· You do not get better as expected. Where can you learn more? Go to http://meghana-raul.info/. Enter P898 in the search box to learn more about \"Tenosynovitis of the Wrist: Care Instructions. \" Current as of: March 21, 2017 Content Version: 11.3 © 4526-9720 Wally. Care instructions adapted under license by [employer] (which disclaims liability or warranty for this information). If you have questions about a medical condition or this instruction, always ask your healthcare professional. [place] [address] any warranty or liability for your use of this information. Restless Legs Syndrome: Care Instructions Your Care Instructions Restless legs syndrome is a common nervous system problem. People with this syndrome feel a creeping, achy, or unpleasant feeling in the legs and an overpowering urge to move them. It often occurs in the evening and at night and can lead to sleep problems and tiredness. Your doctor may suggest doing a study of your sleep patterns to figure out what is happening when you try to sleep. Many people get relief from symptoms when they get regular exercise, eat well, and avoid caffeine, alcohol, and tobacco. 
Follow-up care is a key part of your treatment and safety. Be sure to make and go to all appointments, and call your doctor if you are having problems. It's also a good idea to know your test results and keep a list of the medicines you take. How can you care for yourself at home? · Take your medicines exactly as prescribed.  Call your doctor if you think you are having a problem with your medicine. · Try bathing in hot or cold water. Applying a heating pad or ice bag to your legs may also help symptoms. · Stretch and massage your legs before bed or when discomfort begins. · Get some exercise for at least 30 minutes a day on most days of the week. Stop exercising at least 3 hours before bedtime. · Try to plan for situations where you will need to remain seated for long stretches. For example, if you are traveling by car, plan some stops so you can get out and walk around. · Tell your doctor about any medicines you are taking. This includes all over-the-counter, prescription, and herbal medicines. Some medicines, such as antidepressants, antihistamines, and cold and sinus medicines, can make your symptoms worse. · Avoid caffeine products, such as coffee, tea, cola, and chocolate. Caffeine can interrupt your sleep and stimulate you. · Do not smoke. Nicotine can make restless legs worse. If you need help quitting, talk to your doctor about stop-smoking programs and medicines. These can increase your chances of quitting for good. · Do not drink alcohol late in the evening. Take steps to help you sleep better · Get plenty of sunlight in the outdoors, particularly later in the afternoon. · Use the evening hours for settling down. Avoid activities that challenge you in the hours before bedtime. · Eat meals at regular times, and do not snack before bedtime. · Keep your bedroom quiet, dark, and cool. Try using a sleep mask and earplugs to help you sleep. · Limit how much you drink at night to reduce your need to get up to urinate. But do not go to bed thirsty. · Run a fan or other steady \"white noise\" during the night if noises wake you up. · [place] the bed for sleeping and sex. Do your reading or TV watching in another room. · Once you are in bed, relax from head to toe, and guide your mind to pleasant thoughts. · Do not stay in bed longer than 8 hours, and try to avoid naps. · If your symptoms usually improve around 4 a.m. to 6 a.m., try going to bed later than usual or allowing extra time for sleeping in to help you get the rest you need. When should you call for help? Watch closely for changes in your health, and be sure to contact your doctor if: 
· You are still not getting enough sleep. · Your symptoms become more severe or happen more often. Where can you learn more? Go to http://meghana-raul.info/. Enter N845 in the search box to learn more about \"Restless Legs Syndrome: Care Instructions. \" Current as of: October 14, 2016 Content Version: 11.3 © 5324-4060 boarding pass. Care instructions adapted under license by [employer] (which disclaims liability or warranty for this information). If you have questions about a medical condition or this instruction, always ask your healthcare professional. [place] [address] any warranty or liability for your use of this information. Introducing hospitals & HEALTH SERVICES! Dear Cuco Torres: 
Thank you for requesting a Bid[employer] account. Our records indicate that you already have an active Bidstalk account. You can access your account anytime at [email or web address] Did you know that you can access your hospital and ER discharge instructions at any time in Bidstalk? You can also review all of your test results from your hospital stay or ER visit. Additional Information If you have questions, please visit the Frequently Asked Questions section of the B[employer] website at [email or web address] Remember, Bidstalk is NOT to be used for urgent needs. For medical emergencies, dial 911. Now available from your iPhone and Android! Please provide this summary of care documentation to your next provider. Your primary care clinician is listed as NONE. If you have any questions after today's visit, please call [phone].

## 2017-09-26 ENCOUNTER — HOSPITAL ENCOUNTER (OUTPATIENT)
Dept: INFUSION THERAPY | Age: 52
Discharge: HOME | End: 2017-09-26
Payer: SELF-PAY

## 2017-09-26 VITALS
SYSTOLIC BLOOD PRESSURE: 121 MMHG | TEMPERATURE: 97.8 F | HEART RATE: 86 BPM | RESPIRATION RATE: 18 BRPM | OXYGEN SATURATION: 99 % | DIASTOLIC BLOOD PRESSURE: 77 MMHG

## 2017-09-26 PROCEDURE — 74011000258 HC RX REV CODE- 258: Performed by: NURSE PRACTITIONER

## 2017-09-26 PROCEDURE — 74011250636 HC RX REV CODE- 250/636: Performed by: NURSE PRACTITIONER

## 2017-09-26 PROCEDURE — 96365 THER/PROPH/DIAG IV INF INIT: CPT

## 2017-09-26 RX ORDER — SODIUM CHLORIDE 0.9 % (FLUSH) 0.9 %
5-10 SYRINGE (ML) INJECTION AS NEEDED
Status: CANCELLED | OUTPATIENT
Start: 2017-09-26 | End: 2017-09-27

## 2017-09-26 RX ADMIN — SODIUM CHLORIDE 1000 MG: 900 INJECTION, SOLUTION INTRAVENOUS at 16:47

## 2017-09-26 NOTE — PROGRESS NOTES
[address] Progress Note:     2016. Pt arrived ambulatory and in no distress for Day 1 of Solumedrol. Assessment completed. No new complaints voiced. IV established to L forearm. Patient Vitals for the past 12 hrs:   Temp Pulse Resp BP SpO2   09/26/17 1730 97.8 °F (36.6 °C) 86 18 121/77 99 %   09/26/17 1619 97.8 °F (36.6 °C) 77 18 112/78 -       Medications:  Solumedrol IV    1730. Discharged home ambulatory and in no distress. IV removed.  Next appointment 09/27/17

## 2017-09-26 NOTE — DISCHARGE INSTRUCTIONS
OUTPATIENT INFUSION CENTER    DISCHARGE INSTRUCTIONS FOR:    CORTICOSTEROIDS  (Decadron, Prednisone, Solumedrol, etc.)    1. Avoid vaccines during therapy unless otherwise prescribed by your physician. It is best to avoid alcohol while taking this medication. 2. If you are a diabetic patient, monitor and report unusually high blood sugar levels to your physician. These medications may cause extremely high blood glucose levels while you are taking them. (However, this effect is usually temporary.)    3. This medication may cause a change in sleep patterns. If you experience difficulty sleeping while on corticosteroids, contact your physician for suggestions. You may also feel restless, anxious or agitated. 4.  You may experience an increase or decrease in appetite or upset stomach. 5.   Dont stop taking this medication suddenly. You may need to slowly decrease your dose. 6.  Notify your physician if you have wounds that are not healing properly. You may also have some fluid retention and mild swelling of the extremities. If you are going to have surgery, make sure your surgeon or dentist is aware that you are taking steroids. You may notice temporary redness or flushing of the skin. All medications have the potential to cause side effects; however, signs and symptoms of an allergic reaction may require immediate medical attention. These may include one or more of the following:     Skin redness, itching, swelling, blistering, weeping, crusting, rash or hives; Wheezing, chest tightness, cough, or shortness of breath, racing pulse;   Swelling of the face, eyelids, lips, tongue, or throat;  Stuffy nose, runny nose, sneezing;   Red (bloodshot), painful, itchy, swollen, or watery eyes or blurry vision;  Stomach pain, nausea, vomiting, diarrhea, or bloody or black stools; Severe swelling of extremities or joints. Extreme agitation;  Severe headache;  Pain when urinating.     Contact your physicians office with any questions or concerns regarding your treatment.     Mark Schultz, Signature: ___________________________________ 9/26/2017  Cynthia Conway RN

## 2017-09-27 ENCOUNTER — HOSPITAL ENCOUNTER (OUTPATIENT)
Dept: INFUSION THERAPY | Age: 52
Discharge: HOME | End: 2017-09-27
Payer: SELF-PAY

## 2017-09-27 VITALS
DIASTOLIC BLOOD PRESSURE: 77 MMHG | SYSTOLIC BLOOD PRESSURE: 123 MMHG | HEART RATE: 89 BPM | RESPIRATION RATE: 18 BRPM | TEMPERATURE: 97.3 F

## 2017-09-27 PROCEDURE — 74011250636 HC RX REV CODE- 250/636: Performed by: NURSE PRACTITIONER

## 2017-09-27 PROCEDURE — 96365 THER/PROPH/DIAG IV INF INIT: CPT

## 2017-09-27 PROCEDURE — 74011000258 HC RX REV CODE- 258: Performed by: NURSE PRACTITIONER

## 2017-09-27 RX ORDER — SODIUM CHLORIDE 0.9 % (FLUSH) 0.9 %
5-10 SYRINGE (ML) INJECTION AS NEEDED
Status: ACTIVE | OUTPATIENT
Start: 2017-09-27 | End: 2017-09-28

## 2017-09-27 RX ORDER — SODIUM CHLORIDE 9 MG/ML
50 INJECTION, SOLUTION INTRAVENOUS CONTINUOUS
Status: DISPENSED | OUTPATIENT
Start: 2017-09-27 | End: 2017-09-28

## 2017-09-27 RX ADMIN — SODIUM CHLORIDE 1000 MG: 900 INJECTION, SOLUTION INTRAVENOUS at 16:38

## 2017-09-27 RX ADMIN — SODIUM CHLORIDE 50 ML/HR: 900 INJECTION, SOLUTION INTRAVENOUS at 16:30

## 2017-09-27 NOTE — PROGRESS NOTES
Problem: Patient Education: Go to Education Activity  Goal: Patient/Family Education  Outcome: Progressing Towards Goal  S/E of  solumedrol

## 2017-09-27 NOTE — PROGRESS NOTES
[facility] Note:  [id number] arrived at [facility] ambulatory and in no distress for D2/3. Assessment stable, no new complaints voiced. She has generalized and joint pain. Level 8 today. Has facial flushing and metallic taste. Medications received: Solumedrol over 30 minutes    1715 Tolerated treatment well, no adverse reaction noted. D/Cd from [facility] ambulatory and in no distress accompanied by self. Next appt 1600 9/28  IV removed per pt request .  Visit Vitals    /77    Pulse 89    Temp 97.3 °F (36.3 °C)    Resp 18     No results found for this or any previous visit (from the past 12 hour(s)).

## 2017-09-28 ENCOUNTER — HOSPITAL ENCOUNTER (OUTPATIENT)
Dept: INFUSION THERAPY | Age: 52
Discharge: HOME | End: 2017-09-28
Payer: SELF-PAY

## 2017-09-28 VITALS
RESPIRATION RATE: 18 BRPM | SYSTOLIC BLOOD PRESSURE: 107 MMHG | DIASTOLIC BLOOD PRESSURE: 59 MMHG | HEART RATE: 64 BPM | TEMPERATURE: 97.7 F

## 2017-09-28 PROCEDURE — 96365 THER/PROPH/DIAG IV INF INIT: CPT

## 2017-09-28 PROCEDURE — 74011000258 HC RX REV CODE- 258: Performed by: NURSE PRACTITIONER

## 2017-09-28 PROCEDURE — 74011250636 HC RX REV CODE- 250/636: Performed by: PSYCHIATRY & NEUROLOGY

## 2017-09-28 PROCEDURE — 74011250636 HC RX REV CODE- 250/636: Performed by: NURSE PRACTITIONER

## 2017-09-28 RX ORDER — SODIUM CHLORIDE 0.9 % (FLUSH) 0.9 %
5-10 SYRINGE (ML) INJECTION AS NEEDED
Status: ACTIVE | OUTPATIENT
Start: 2017-09-28 | End: 2017-09-29

## 2017-09-28 RX ORDER — SODIUM CHLORIDE 9 MG/ML
25 INJECTION, SOLUTION INTRAVENOUS AS NEEDED
Status: DISPENSED | OUTPATIENT
Start: 2017-09-28 | End: 2017-09-29

## 2017-09-28 RX ADMIN — Medication 10 ML: at 16:23

## 2017-09-28 RX ADMIN — SODIUM CHLORIDE 25 ML/HR: 900 INJECTION, SOLUTION INTRAVENOUS at 16:23

## 2017-09-28 RX ADMIN — SODIUM CHLORIDE 1000 MG: 900 INJECTION, SOLUTION INTRAVENOUS at 16:27

## 2017-09-28 NOTE — PROGRESS NOTES
Outpatient Infusion Center Progress Note    1600 Pt admit to [facility] for day 3 of 3 Solumedrol ambulatory in stable condition. Assessment completed. No new concerns voiced. Peripheral IV accessed in right arm with positive blood return. PIV flushed and NS started at [facility]. Visit Vitals    /59 (BP 1 Location: Left arm, BP Patient Position: Sitting)    Pulse 64    Temp 97.7 °F (36.5 °C)    Resp 18       Medications:  NS KVO  Solumedrol over 1 hour  1730 Pt tolerated treatment well. PIV maintained positive blood return throughout treatment. PIV flushed and deaccessed per protocol. D/c home ambulatory in no distress. There are no other appointments scheduled at this time.

## 2017-12-19 ENCOUNTER — OFFICE VISIT (OUTPATIENT)
Dept: NEUROLOGY | Age: 52
End: 2017-12-19

## 2017-12-19 VITALS
RESPIRATION RATE: 20 BRPM | DIASTOLIC BLOOD PRESSURE: 70 MMHG | HEART RATE: 76 BPM | HEIGHT: 66 IN | OXYGEN SATURATION: 99 % | WEIGHT: 126 LBS | SYSTOLIC BLOOD PRESSURE: 124 MMHG | BODY MASS INDEX: 20.25 KG/M2

## 2017-12-19 DIAGNOSIS — F98.8 ATTENTION DEFICIT DISORDER (ADD) WITHOUT HYPERACTIVITY: ICD-10-CM

## 2017-12-19 DIAGNOSIS — M25.50 ARTHRALGIA, UNSPECIFIED JOINT: ICD-10-CM

## 2017-12-19 DIAGNOSIS — G35 MS (MULTIPLE SCLEROSIS) (HCC): Primary | ICD-10-CM

## 2017-12-19 DIAGNOSIS — D50.8 IRON DEFICIENCY ANEMIA SECONDARY TO INADEQUATE DIETARY IRON INTAKE: ICD-10-CM

## 2017-12-19 DIAGNOSIS — G25.81 RESTLESS LEG SYNDROME: ICD-10-CM

## 2017-12-19 RX ORDER — MELOXICAM 15 MG/1
15 TABLET ORAL DAILY
Qty: 30 TAB | Refills: 3 | Status: SHIPPED | OUTPATIENT
Start: 2017-12-19 | End: 2018-05-02 | Stop reason: SDUPTHER

## 2017-12-19 RX ORDER — DEXTROAMPHETAMINE SACCHARATE, AMPHETAMINE ASPARTATE, DEXTROAMPHETAMINE SULFATE AND AMPHETAMINE SULFATE 2.5; 2.5; 2.5; 2.5 MG/1; MG/1; MG/1; MG/1
10 TABLET ORAL 2 TIMES DAILY
Qty: 60 TAB | Refills: 0 | Status: SHIPPED | OUTPATIENT
Start: 2017-12-19 | End: 2018-02-07 | Stop reason: SDUPTHER

## 2017-12-19 NOTE — PROGRESS NOTES
MS- has been pretty good has had a lot of pain but she thinks its because of the weather   She has been coping well with it but she thinks its the pot the has helped her the most   Her daughter thinks the lemtrada is the difference

## 2017-12-19 NOTE — PROGRESS NOTES
Date:  17     Name:  Pieter Vallejo  :  1965  MRN:  [id number]     PCP:  None    Chief Complaint   Patient presents with    Multiple Sclerosis     HISTORY OF PRESENT ILLNESS: Follow up for RRMS. She continues to be compliant with the REMS protocol for Lemtrada. She is accompanied by her daughter who helps to provide some of the history. Per her daughter, she feels as though her mother's pain has become more even keeled. Also, she has noticed that her mother's focus and concentration is significantly improved when she is taking the Adderall. Ms. Jesus Mooney indicates that she continues to have significant joint pain but this seems to be worse when he becomes cold outside. She does smoke marijuana to help manage this pain. Except as noted above, denies  fever, chills, cough. No CP or SOB. No dysuria, loss of bowel or bladder control. No Weight loss. Appetite good. Sleeping well. No sweats. No edema. No bruising or bleeding. No nausea or vomit. No diarrhea. No frequency, urgency, No depressive sxs. No anxiety. Denies sore throat, nasal congestion, nasal discharge, epistaxis, tinnitus, hearing loss, back pain, muscle pain, or joint pain. Current Outpatient Prescriptions   Medication Sig    dextroamphetamine-amphetamine (ADDERALL) 10 mg tablet Take 1 Tab (10 mg total) by mouth two (2) times a day. Max Daily Amount: 20 mg    valACYclovir (VALTREX) 500 mg tablet Take 1 Tab by mouth two (2) times a day.  amitriptyline (ELAVIL) 100 mg tablet Take 1 Tab by mouth nightly.  gabapentin (NEURONTIN) 300 mg capsule Take 2 Caps by mouth three (3) times daily. No current facility-administered medications for this visit.       No Known Allergies  Past Medical History:   Diagnosis Date    Delivery normal     x2    Fibromyalgia     Headache(784.0)     Migraine headache     Multiple sclerosis (HCC)     Sarcoidosis     Sun-damaged skin     Tanning bed exposure      Past Surgical History:   Procedure Laterality Date    ABDOMEN SURGERY PROC UNLISTED      galbladder removal    HX CHOLECYSTECTOMY  1990    HX GYN      tubal ligation    HX GYN      tubial     HX OTHER SURGICAL      tubal ligation    HX TUBAL LIGATION  1994     Social History     Social History    Marital status: SINGLE     Spouse name: N/A    Number of children: N/A    Years of education: N/A     Occupational History    Not on file. Social History Main Topics    Smoking status: Current Every Day Smoker     Packs/day: 1.00     Years: 30.00     Types: Cigarettes    Smokeless tobacco: Never Used    Alcohol use No    Drug use: Yes     Special: Marijuana    Sexual activity: Yes     Partners: Male     Birth control/ protection: Surgical     Other Topics Concern    Not on file     Social History Narrative    ** Merged History Encounter **          Family History   Problem Relation Age of Onset    Thyroid Disease Sister     Thyroid Disease Sister     Other Mother      Donchavo Silvestre rusty's disease 62    Heart Disease Father     Cancer Father      lymphoma       PHYSICAL EXAMINATION:    Visit Vitals    /70    Pulse 76    Resp 20    Ht 5' 6\" (1.676 m)    Wt 57.2 kg (126 lb)    SpO2 99%    BMI 20.34 kg/m2     General: Well defined, nourished, and groomed individual in no acute distress.    Neck: Supple, nontender, no bruits    Heart: Regular rate and rhythm    Lungs: Clear to auscultation bilaterally    Musculoskeletal: Extremities revealed no edema and had full range of motion of joints. There is some pain with palpation with at the greater trochanteric bursa bilaterally.    Psych: Good mood and bright affect    NEUROLOGICAL EXAMINATION:    Mental Status: Alert and oriented to person, place, and time with recent and remote memory intact.    Cranial Nerves:    II, III, IV, VI: Visual acuity grossly intact. Visual fields are normal.    Pupils are equal, round, and reactive to light and accommodation.    Extra-ocular movements are full and fluid. Fundoscopic exam was benign, no ptosis or nystagmus.    V-XII: Hearing is grossly intact. Facial features are symmetric, with normal sensation and strength. The palate rises symmetrically and the tongue protrudes midline. Sternocleidomastoids 5/5.    Motor Examination: Normal tone, bulk, very trace weakness noted in the left upper extremity  Coordination: No resting or intention tremor    Gait and Station: Steady while walking. No muscle wasting or fasiculations noted.    Reflexes: DTRs 2+ throughout. ASSESSMENT AND PLAN    ICD-10-CM ICD-9-CM    1. [facility]) ([facility] G35 340    2. Restless leg syndrome G25.81 333.94    3. Iron deficiency anemia secondary to inadequate dietary iron intake D50.8 280.1    4. Arthralgia, unspecified joint M25.50 719.40 RHEUMATOID FACTOR, QL      meloxicam (MOBIC) 15 mg tablet   5. Attention deficit disorder (ADD) without hyperactivity F98.8 314.00 dextroamphetamine-amphetamine (ADDERALL) 10 mg tablet     Overall, the relapsing remitting multiple sclerosis appears to be pretty stable. She will continue with the rems program.  Adderall seems to be helping with her attention deficit disorder, focus, concentration, and energy levels. She will continue taking Adderall as previously prescribed. She does have significant complaints of diffuse joint pain. Question if this may be more of an arthritic type issue. She was sent for rheumatoid factor today. She is placed on a trial of Mobic 15 mg daily. Purpose of potential side effects were discussed and she has verbalized understanding. Follow-up in 3 months or sooner if needed. Myriam Rhodes

## 2017-12-19 NOTE — PATIENT INSTRUCTIONS

## 2017-12-19 NOTE — MR AVS SNAPSHOT
Visit Information Date & Time Provider Department Dept. Phone Encounter #  
 12/19/2017  1:30 PM Zoë Rios NP [facility] Neurology [facility] [phone] [id number] Upcoming Health Maintenance Date Due Pneumococcal 19-64 Medium Risk (1 of 1 - PPSV23) 3/1/1985 DTaP/Tdap/Td series (1 - Tdap) 3/1/1987 FOBT Q 1 YEAR AGE 50-75 3/1/2016 PAP AKA CERVICAL CYTOLOGY 5/6/2016 Influenza Age 5 to Adult 8/1/2017 Allergies as of 12/19/2017  Review Complete On: 12/19/2017 By: Zoë Rios NP No Known Allergies Current Immunizations  Reviewed on 9/28/2017 No immunizations on file. Not reviewed this visit You Were Diagnosed With   
  
 Codes Comments MS (multiple sclerosis) ([facility]    -  Primary ICD-10-CM: G35 
ICD-9-CM: 377 Restless leg syndrome     ICD-10-CM: G25.81 ICD-9-CM: 333.94 Iron deficiency anemia secondary to inadequate dietary iron intake     ICD-10-CM: D50.8 ICD-9-CM: 280.1 Arthralgia, unspecified joint     ICD-10-CM: M25.50 ICD-9-CM: 719.40 Attention deficit disorder (ADD) without hyperactivity     ICD-10-CM: F98.8 ICD-9-CM: 314.00 Vitals BP Pulse Resp Height(growth percentile) Weight(growth percentile) SpO2  
 124/70 76 20 5' 6\" (1.676 m) 126 lb (57.2 kg) 99% BMI OB Status Smoking Status 20.34 kg/m2 Having regular periods Current Every Day Smoker Vitals History BMI and BSA Data Body Mass Index Body Surface Area  
 20.34 kg/m 2 1.63 m 2 Preferred Pharmacy Pharmacy Name Phone [facility] Lawrence Lopez 06, [address] [phone] Your Updated Medication List  
  
   
This list is accurate as of: 12/19/17  2:26 PM.  Always use your most recent med list.  
  
  
  
  
 amitriptyline 100 mg tablet Commonly known as:  ELAVIL Take 1 Tab by mouth nightly. dextroamphetamine-amphetamine 10 mg tablet Commonly known as:  ADDERALL Take 1 Tab (10 mg total) by mouth two (2) times a day. Max Daily Amount: 20 mg  
  
 gabapentin 300 mg capsule Commonly known as:  NEURONTIN Take 2 Caps by mouth three (3) times daily. meloxicam 15 mg tablet Commonly known as:  MOBIC Take 1 Tab by mouth daily. valACYclovir 500 mg tablet Commonly known as:  VALTREX Take 1 Tab by mouth two (2) times a day. Prescriptions Printed Refills  
 dextroamphetamine-amphetamine (ADDERALL) 10 mg tablet 0 Sig: Take 1 Tab (10 mg total) by mouth two (2) times a day. Max Daily Amount: 20 mg  
 Class: Print Route: Oral  
  
Prescriptions Sent to Pharmacy Refills  
 meloxicam (MOBIC) 15 mg tablet 3 Sig: Take 1 Tab by mouth daily. Class: Normal  
 Pharmacy: Shelia Lopez 01, [address] #: [phone] Route: Oral  
  
We Performed the Following RHEUMATOID FACTOR, QL [id number] CPT(R)] To-Do List   
 12/19/2017 Imaging:  MRI BRAIN W WO CONT Patient Instructions A Healthy Lifestyle: Care Instructions Your Care Instructions A healthy lifestyle can help you feel good, stay at a healthy weight, and have plenty of energy for both work and play. A healthy lifestyle is something you can share with your whole family. A healthy lifestyle also can lower your risk for serious health problems, such as high blood pressure, heart disease, and diabetes. You can follow a few steps listed below to improve your health and the health of your family. Follow-up care is a key part of your treatment and safety. Be sure to make and go to all appointments, and call your doctor if you are having problems. It's also a good idea to know your test results and keep a list of the medicines you take. How can you care for yourself at home? · Do not eat too much sugar, fat, or fast foods.  You can still have dessert and treats now and then. The goal is moderation. · Start small to improve your eating habits. Pay attention to portion sizes, drink less juice and soda pop, and eat more fruits and vegetables. ¨ Eat a healthy amount of food. A 3-ounce serving of meat, for example, is about the size of a deck of cards. Fill the rest of your plate with vegetables and whole grains. ¨ Limit the amount of soda and sports drinks you have every day. Drink more water when you are thirsty. ¨ Eat at least 5 servings of fruits and vegetables every day. It may seem like a lot, but it is not hard to reach this goal. A serving or helping is 1 piece of fruit, 1 cup of vegetables, or 2 cups of leafy, raw vegetables. Have an apple or some carrot sticks as an afternoon snack instead of a candy bar. Try to have fruits and/or vegetables at every meal. 
· Make exercise part of your daily routine. You may want to start with simple activities, such as walking, bicycling, or slow swimming. Try to be active 30 to 60 minutes every day. You do not need to do all 30 to 60 minutes all at once. For example, you can exercise 3 times a day for 10 or 20 minutes. Moderate exercise is safe for most people, but it is always a good idea to talk to your doctor before starting an exercise program. 
· Keep moving. [place] the lawn, work in the garden, or [employer]. Take the stairs instead of the elevator at work. · If you smoke, quit. People who smoke have an increased risk for heart attack, stroke, cancer, and other lung illnesses. Quitting is hard, but there are ways to boost your chance of quitting tobacco for good. ¨ Use nicotine gum, patches, or lozenges. ¨ Ask your doctor about stop-smoking programs and medicines. ¨ Keep trying.  
In addition to reducing your risk of diseases in the future, you will notice some benefits soon after you stop using tobacco. If you have shortness of breath or asthma symptoms, they will likely get better within a few weeks after you quit. · Limit how much alcohol you drink. Moderate amounts of alcohol (up to 2 drinks a day for men, 1 drink a day for women) are okay. But drinking too much can lead to liver problems, high blood pressure, and other health problems. Family health If you have a family, there are many things you can do together to improve your health. · Eat meals together as a family as often as possible. · Eat healthy foods. This includes fruits, vegetables, lean meats and dairy, and whole grains. · Include your family in your fitness plan. Most people think of activities such as jogging or tennis as the way to fitness, but there are many ways you and your family can be more active. Anything that makes you breathe hard and gets your heart pumping is exercise. Here are some tips: 
¨ Walk to do errands or to take your child to school or the bus. ¨ Go for a family bike ride after dinner instead of watching TV. Where can you learn more? Go to http://meghana-raul.info/. Enter F171 in the search box to learn more about \"A Healthy Lifestyle: Care Instructions. \" Current as of: May 12, 2017 Content Version: 11.4 © 1632-5086 Guzu. Care instructions adapted under license by [employer] (which disclaims liability or warranty for this information). If you have questions about a medical condition or this instruction, always ask your healthcare professional. [place] [address] any warranty or liability for your use of this information. Introducing [facility] & HEALTH SERVICES! Dear Lia Chapa: 
Thank you for requesting a StemPath account. Our records indicate that you already have an active StemPath account. You can access your account anytime at [email or web address] Did you know that you can access your hospital and ER discharge instructions at any time in StemPath?   You can also review all of your test results from your hospital stay or ER visit. Additional Information If you have questions, please visit the Frequently Asked Questions section of the SincroPool website at [email or web address] Remember, SincroPool is NOT to be used for urgent needs. For medical emergencies, dial 911. Now available from your iPhone and Android! Please provide this summary of care documentation to your next provider. Your primary care clinician is listed as NONE. If you have any questions after today's visit, please call [phone].

## 2018-01-22 LAB
RHEUMATOID FACT SERPL-ACNC: <10 IU/ML (ref 0–13.9)
SPECIMEN STATUS REPORT, ROLRST: NORMAL

## 2018-02-07 DIAGNOSIS — F98.8 ATTENTION DEFICIT DISORDER (ADD) WITHOUT HYPERACTIVITY: ICD-10-CM

## 2018-02-07 RX ORDER — DEXTROAMPHETAMINE SACCHARATE, AMPHETAMINE ASPARTATE, DEXTROAMPHETAMINE SULFATE AND AMPHETAMINE SULFATE 2.5; 2.5; 2.5; 2.5 MG/1; MG/1; MG/1; MG/1
10 TABLET ORAL 2 TIMES DAILY
Qty: 60 TAB | Refills: 0 | Status: SHIPPED | OUTPATIENT
Start: 2018-02-07 | End: 2018-03-06 | Stop reason: SDUPTHER

## 2018-02-07 NOTE — TELEPHONE ENCOUNTER
----- Message from [facility] sent at 2/7/2018  3:53 PM EST -----  Regarding: NP Marvin/ Refjuan  The pt is requesting a refill on her \"Adderall\" medication. Best contact number is [phone].

## 2018-03-06 DIAGNOSIS — F98.8 ATTENTION DEFICIT DISORDER (ADD) WITHOUT HYPERACTIVITY: ICD-10-CM

## 2018-03-06 RX ORDER — DEXTROAMPHETAMINE SACCHARATE, AMPHETAMINE ASPARTATE, DEXTROAMPHETAMINE SULFATE AND AMPHETAMINE SULFATE 2.5; 2.5; 2.5; 2.5 MG/1; MG/1; MG/1; MG/1
10 TABLET ORAL 2 TIMES DAILY
Qty: 60 TAB | Refills: 0 | Status: SHIPPED | OUTPATIENT
Start: 2018-03-06 | End: 2018-03-07 | Stop reason: SDUPTHER

## 2018-03-06 NOTE — TELEPHONE ENCOUNTER
Message from [facility] erroneously sent to our office. ----- Message from [facility] sent at 3/6/2018  1:58 PM EST -----  Regarding: BELLA Chandra / Rx refill  Contact: [phone]  Pt requested a Rx refill on Aderrall 10mgs. Please call once ready for .

## 2018-03-07 ENCOUNTER — TELEPHONE (OUTPATIENT)
Dept: NEUROLOGY | Age: 53
End: 2018-03-07

## 2018-03-07 DIAGNOSIS — F98.8 ATTENTION DEFICIT DISORDER (ADD) WITHOUT HYPERACTIVITY: ICD-10-CM

## 2018-03-07 RX ORDER — DEXTROAMPHETAMINE SACCHARATE, AMPHETAMINE ASPARTATE, DEXTROAMPHETAMINE SULFATE AND AMPHETAMINE SULFATE 2.5; 2.5; 2.5; 2.5 MG/1; MG/1; MG/1; MG/1
10 TABLET ORAL 2 TIMES DAILY
Qty: 60 TAB | Refills: 0 | Status: SHIPPED | OUTPATIENT
Start: 2018-03-07 | End: 2018-04-03 | Stop reason: SDUPTHER

## 2018-04-03 DIAGNOSIS — F98.8 ATTENTION DEFICIT DISORDER (ADD) WITHOUT HYPERACTIVITY: ICD-10-CM

## 2018-04-03 RX ORDER — DEXTROAMPHETAMINE SACCHARATE, AMPHETAMINE ASPARTATE, DEXTROAMPHETAMINE SULFATE AND AMPHETAMINE SULFATE 2.5; 2.5; 2.5; 2.5 MG/1; MG/1; MG/1; MG/1
10 TABLET ORAL 2 TIMES DAILY
Qty: 60 TAB | Refills: 0 | Status: SHIPPED | OUTPATIENT
Start: 2018-04-03 | End: 2018-05-02 | Stop reason: SDUPTHER

## 2018-04-03 NOTE — TELEPHONE ENCOUNTER
----- Message from Eve Ho sent at 4/3/2018 11:13 AM EDT -----  Regarding: BELLA Wong/rx refill  Pt (p) [phone], requesting a rx refill for her Adderall, 10 mg, pt will  when ready,pt is also scheduled to come in on 5/20/18,at 11:30am

## 2018-05-02 ENCOUNTER — OFFICE VISIT (OUTPATIENT)
Dept: NEUROLOGY | Age: 53
End: 2018-05-02

## 2018-05-02 VITALS
HEIGHT: 66 IN | RESPIRATION RATE: 20 BRPM | WEIGHT: 126 LBS | OXYGEN SATURATION: 98 % | BODY MASS INDEX: 20.25 KG/M2 | DIASTOLIC BLOOD PRESSURE: 74 MMHG | HEART RATE: 80 BPM | SYSTOLIC BLOOD PRESSURE: 112 MMHG

## 2018-05-02 DIAGNOSIS — G89.4 CHRONIC PAIN SYNDROME: ICD-10-CM

## 2018-05-02 DIAGNOSIS — M25.50 ARTHRALGIA, UNSPECIFIED JOINT: ICD-10-CM

## 2018-05-02 DIAGNOSIS — G35 MS (MULTIPLE SCLEROSIS) (HCC): Primary | ICD-10-CM

## 2018-05-02 DIAGNOSIS — F98.8 ATTENTION DEFICIT DISORDER (ADD) WITHOUT HYPERACTIVITY: ICD-10-CM

## 2018-05-02 RX ORDER — GABAPENTIN 300 MG/1
600 CAPSULE ORAL 3 TIMES DAILY
Qty: 360 CAP | Refills: 2 | Status: SHIPPED | OUTPATIENT
Start: 2018-05-02 | End: 2018-08-01 | Stop reason: SDUPTHER

## 2018-05-02 RX ORDER — VALACYCLOVIR HYDROCHLORIDE 500 MG/1
500 TABLET, FILM COATED ORAL 2 TIMES DAILY
Qty: 60 TAB | Refills: 2 | Status: SHIPPED | OUTPATIENT
Start: 2018-05-02 | End: 2018-11-01 | Stop reason: ALTCHOICE

## 2018-05-02 RX ORDER — DEXTROAMPHETAMINE SACCHARATE, AMPHETAMINE ASPARTATE, DEXTROAMPHETAMINE SULFATE AND AMPHETAMINE SULFATE 2.5; 2.5; 2.5; 2.5 MG/1; MG/1; MG/1; MG/1
10 TABLET ORAL 2 TIMES DAILY
Qty: 60 TAB | Refills: 0 | Status: SHIPPED | OUTPATIENT
Start: 2018-05-02 | End: 2018-06-01 | Stop reason: SDUPTHER

## 2018-05-02 RX ORDER — MELOXICAM 15 MG/1
15 TABLET ORAL DAILY
Qty: 30 TAB | Refills: 3 | Status: SHIPPED | OUTPATIENT
Start: 2018-05-02 | End: 2018-05-28

## 2018-05-02 NOTE — PROGRESS NOTES
MS- she has been doing okay but she has been taking care of the grandson while her daughter has been in the hospital with the current pregnancy she is on bed rest   She thinks she might need the adderal to be increased

## 2018-05-02 NOTE — MR AVS SNAPSHOT
[address] [place] [address] 
 
 
 [place] [address] [address] [phone] Patient: Xochitl Moran MRN: [id number] :1965 Visit Information Date & Time Provider Department Dept. Phone Encounter #  
 2018 11:30 AM Carla Amaro NP [facility] Neurology [facility] [phone] [id number] Your Appointments 2018 11:30 AM  
Follow Up with Carla Amaro NP  [facility] ([facility]) Appt Note: 3mo f/up MS cr  
 [place] [address] [address] [phone]  
  
   
 [place]  [address] [address] [address] Upcoming Health Maintenance Date Due Pneumococcal 19-64 Medium Risk (1 of 1 - PPSV23) 3/1/1985 DTaP/Tdap/Td series (1 - Tdap) 3/1/1987 BREAST CANCER SCRN MAMMOGRAM 3/1/2016 FOBT Q 1 YEAR AGE 50-75 3/1/2016 PAP AKA CERVICAL CYTOLOGY 2016 Influenza Age 5 to Adult 2018 Allergies as of 2018  Review Complete On: 2018 By: Carla Amaro NP No Known Allergies Current Immunizations  Reviewed on 2017 No immunizations on file. Not reviewed this visit You Were Diagnosed With   
  
 Codes Comments MS (multiple sclerosis) ([facility]    -  Primary ICD-10-CM: G35 
ICD-9-CM: 562 Attention deficit disorder (ADD) without hyperactivity     ICD-10-CM: F98.8 ICD-9-CM: 314.00 Arthralgia, unspecified joint     ICD-10-CM: M25.50 ICD-9-CM: 719.40 Chronic pain syndrome     ICD-10-CM: G89.4 ICD-9-CM: 338. 4 Vitals BP Pulse Resp Height(growth percentile) Weight(growth percentile) SpO2  
 112/74 80 20 5' 6\" (1.676 m) 126 lb (57.2 kg) 98% BMI OB Status Smoking Status 20.34 kg/m2 Having regular periods Current Every Day Smoker Vitals History BMI and BSA Data  Body Mass Index Body Surface Area  
 20.34 kg/m 2 1.63 m 2  
  
  
 Preferred Pharmacy Pharmacy Name Phone Merissa Lopez 50, [address] [phone] Your Updated Medication List  
  
   
This list is accurate as of 5/2/18 12:42 PM.  Always use your most recent med list.  
  
  
  
  
 dextroamphetamine-amphetamine 10 mg tablet Commonly known as:  ADDERALL Take 1 Tab (10 mg total) by mouth two (2) times a day. Max Daily Amount: 20 mg  
  
 gabapentin 300 mg capsule Commonly known as:  NEURONTIN Take 2 Caps by mouth three (3) times daily. meloxicam 15 mg tablet Commonly known as:  MOBIC Take 1 Tab by mouth daily. valACYclovir 500 mg tablet Commonly known as:  VALTREX Take 1 Tab by mouth two (2) times a day. Prescriptions Printed Refills  
 dextroamphetamine-amphetamine (ADDERALL) 10 mg tablet 0 Sig: Take 1 Tab (10 mg total) by mouth two (2) times a day. Max Daily Amount: 20 mg  
 Class: Print Route: Oral  
  
Prescriptions Sent to Pharmacy Refills  
 meloxicam (MOBIC) 15 mg tablet 3 Sig: Take 1 Tab by mouth daily. Class: Normal  
 Pharmacy: Merissa Bravo [place] [address] Ph #: [phone] Route: Oral  
 valACYclovir (VALTREX) 500 mg tablet 2 Sig: Take 1 Tab by mouth two (2) times a day. Class: Normal  
 Pharmacy: Merissa Cardona [place] [address] Ph #: [phone] Route: Oral  
 gabapentin (NEURONTIN) 300 mg capsule 2 Sig: Take 2 Caps by mouth three (3) times daily. Class: Normal  
 Pharmacy: [place] [address] Ph #: [phone] Route: Oral  
  
Patient Instructions A Healthy Lifestyle: Care Instructions Your Care Instructions A healthy lifestyle can help you feel good, stay at a healthy weight, and have plenty of energy for both work and play. A healthy lifestyle is something you can share with your whole family. A healthy lifestyle also can lower your risk for serious health problems, such as high blood pressure, heart disease, and diabetes. You can follow a few steps listed below to improve your health and the health of your family. Follow-up care is a key part of your treatment and safety. Be sure to make and go to all appointments, and call your doctor if you are having problems. It's also a good idea to know your test results and keep a list of the medicines you take. How can you care for yourself at home? · Do not eat too much sugar, fat, or fast foods. You can still have dessert and treats now and then. The goal is moderation. · Start small to improve your eating habits. Pay attention to portion sizes, drink less juice and soda pop, and eat more fruits and vegetables. ¨ Eat a healthy amount of food. A 3-ounce serving of meat, for example, is about the size of a deck of cards. Fill the rest of your plate with vegetables and whole grains. ¨ Limit the amount of soda and sports drinks you have every day. Drink more water when you are thirsty. ¨ Eat at least 5 servings of fruits and vegetables every day. It may seem like a lot, but it is not hard to reach this goal. A serving or helping is 1 piece of fruit, 1 cup of vegetables, or 2 cups of leafy, raw vegetables. Have an apple or some carrot sticks as an afternoon snack instead of a candy bar. Try to have fruits and/or vegetables at every meal. 
· Make exercise part of your daily routine. You may want to start with simple activities, such as walking, bicycling, or slow swimming. Try to be active 30 to 60 minutes every day. You do not need to do all 30 to 60 minutes all at once. For example, you can exercise 3 times a day for 10 or 20 minutes.  Moderate exercise is safe for most people, but it is always a good idea to talk to your doctor before starting an exercise program. 
· Keep moving. [place] Matters the lawn, work in the garden, or [place]. Take the stairs instead of the elevator at work. · If you smoke, quit. People who smoke have an increased risk for heart attack, stroke, cancer, and other lung illnesses. Quitting is hard, but there are ways to boost your chance of quitting tobacco for good. ¨ Use nicotine gum, patches, or lozenges. ¨ Ask your doctor about stop-smoking programs and medicines. ¨ Keep trying. In addition to reducing your risk of diseases in the future, you will notice some benefits soon after you stop using tobacco. If you have shortness of breath or asthma symptoms, they will likely get better within a few weeks after you quit. · Limit how much alcohol you drink. Moderate amounts of alcohol (up to 2 drinks a day for men, 1 drink a day for women) are okay. But drinking too much can lead to liver problems, high blood pressure, and other health problems. Family health If you have a family, there are many things you can do together to improve your health. · Eat meals together as a family as often as possible. · Eat healthy foods. This includes fruits, vegetables, lean meats and dairy, and whole grains. · Include your family in your fitness plan. Most people think of activities such as jogging or tennis as the way to fitness, but there are many ways you and your family can be more active. Anything that makes you breathe hard and gets your heart pumping is exercise. Here are some tips: 
¨ Walk to do errands or to take your child to school or the bus. ¨ Go for a family bike ride after dinner instead of watching TV. Where can you learn more? Go to http://meghana-raul.info/. Enter G755 in the search box to learn more about \"A Healthy Lifestyle: Care Instructions. \" Current as of: May 12, 2017 Content Version: 11.4 © 1562-2943 Healthwise, Incorporated. Care instructions adapted under license by [employer] (which disclaims liability or warranty for this information). If you have questions about a medical condition or this instruction, always ask your healthcare professional. [address] any warranty or liability for your use of this information. Introducing [facility] & HEALTH SERVICES! Dear Paola Carrizales: 
Thank you for requesting a Telepo account. Our records indicate that you already have an active Telepo account. You can access your account anytime at [email or web address] Village Did you know that you can access your hospital and ER discharge instructions at any time in Telepo? You can also review all of your test results from your hospital stay or ER visit. Additional Information If you have questions, please visit the Frequently Asked Questions section of the Telepo website at [email or web address] Remember, Telepo is NOT to be used for urgent needs. For medical emergencies, dial 911. Now available from your iPhone and Android! Please provide this summary of care documentation to your next provider. Your primary care clinician is listed as NONE. If you have any questions after today's visit, please call [phone].

## 2018-05-02 NOTE — PROGRESS NOTES
Date:  18     Name:  Richard Jaimes  :  1965  MRN:  [id number]     PCP:  None    Chief Complaint   Patient presents with    Multiple Sclerosis     HISTORY OF PRESENT ILLNESS: Follow up for RRMS. [facility] [place] treatment was in 2017. She has been running after her grandchild from [place] until whenever. Her half sister passed away. With all of this, she feels like she has been doing well. She had a day or two where she had a little vision change. She had some floaters but this resolved. The attention and fatigue have not been great. If she takes it at breakfast and lunch, by 6pm, she is finding that the fatigue is worse. The attention during the day is for the most part it is okay but there are days where she has had more trouble. The Mobic does help with the arthritic issues. Except as noted above, denies  fever, chills, cough. No CP or SOB. No dysuria, loss of bowel or bladder control. No Weight loss. Appetite good. Sleeping well. No sweats. No edema. No bruising or bleeding. No nausea or vomit. No diarrhea. No frequency, urgency, No depressive sxs. No anxiety. Denies sore throat, nasal congestion, nasal discharge, epistaxis, tinnitus, hearing loss, back pain, muscle pain, or joint pain. Current Outpatient Prescriptions   Medication Sig    dextroamphetamine-amphetamine (ADDERALL) 10 mg tablet Take 1 Tab (10 mg total) by mouth two (2) times a dayEarliest Fill Date: 4/3/18. Max Daily Amount: 20 mg    meloxicam (MOBIC) 15 mg tablet Take 1 Tab by mouth daily.  valACYclovir (VALTREX) 500 mg tablet Take 1 Tab by mouth two (2) times a day.  gabapentin (NEURONTIN) 300 mg capsule Take 2 Caps by mouth three (3) times daily. No current facility-administered medications for this visit.       No Known Allergies  Past Medical History:   Diagnosis Date    Delivery normal     x2    Fibromyalgia     Headache(784.0)     Migraine headache     Multiple sclerosis (HCC)  Sarcoidosis     Sun-damaged skin     Tanning bed exposure      Past Surgical History:   Procedure Laterality Date    ABDOMEN SURGERY PROC UNLISTED      galbladder removal    HX CHOLECYSTECTOMY  1990    HX GYN      tubal ligation    HX GYN      tubial     HX OTHER SURGICAL      tubal ligation    HX TUBAL LIGATION  1994     Social History     Social History    Marital status: SINGLE     Spouse name: N/A    Number of children: N/A    Years of education: N/A     Occupational History    Not on file. Social History Main Topics    Smoking status: Current Every Day Smoker     Packs/day: 1.00     Years: 30.00     Types: Cigarettes    Smokeless tobacco: Never Used    Alcohol use No    Drug use: Yes     Special: Marijuana    Sexual activity: Yes     Partners: Male     Birth control/ protection: Surgical     Other Topics Concern    Not on file     Social History Narrative    ** Merged History Encounter **          Family History   Problem Relation Age of Onset    Thyroid Disease Sister     Thyroid Disease Sister     Other Mother      Fletcher Even rusty's disease 62    Heart Disease Father     Cancer Father      lymphoma       PHYSICAL EXAMINATION:    Visit Vitals    /74    Pulse 80    Resp 20    Ht 5' 6\" (1.676 m)    Wt 57.2 kg (126 lb)    SpO2 98%    BMI 20.34 kg/m2     General: Well defined, nourished, and groomed individual in no acute distress.    Neck: Supple, nontender, no bruits    Heart: Regular rate and rhythm    Lungs: Clear to auscultation bilaterally    Musculoskeletal: Extremities revealed no edema and had full range of motion of joints. There is some pain with palpation with at the greater trochanteric bursa bilaterally.    Psych: Good mood and bright affect    NEUROLOGICAL EXAMINATION:    Mental Status: Alert and oriented to person, place, and time with recent and remote memory intact.    Cranial Nerves:    II, III, IV, VI: Visual acuity grossly intact.  Visual fields are normal.    Pupils are equal, round, and reactive to light and accommodation.    Extra-ocular movements are full and fluid. Fundoscopic exam was benign, no ptosis or nystagmus.    V-XII: Hearing is grossly intact. Facial features are symmetric, with normal sensation and strength. The palate rises symmetrically and the tongue protrudes midline. Sternocleidomastoids 5/5.    Motor Examination: Normal tone, bulk, very trace weakness noted in the left upper extremity  Coordination: No resting or intention tremor    Gait and Station: Steady while walking. No muscle wasting or fasiculations noted.    Reflexes: DTRs 2+ throughout. ASSESSMENT AND PLAN    ICD-10-CM ICD-9-CM    1. MS (multiple sclerosis) ([facility]) G35 340 valACYclovir (VALTREX) 500 mg tablet   2. Attention deficit disorder (ADD) without hyperactivity F98.8 314.00 dextroamphetamine-amphetamine (ADDERALL) 10 mg tablet   3. Arthralgia, unspecified joint M25.50 719.40 meloxicam (MOBIC) 15 mg tablet   4. Chronic pain syndrome G89.4 338.4 gabapentin (NEURONTIN) 300 mg capsule     Overall, she is doing about the same. The ADD issue is definitely better on the Adderall but there are times when she feels like it is not as good. These are not frequent episodes and not unmanageable. The chronic pain is manageable with the gabapentin and the addition of the Mobic. As her CD4 counts are still below 200 at her last check so will continue with the Valtrex. Continue with [facility] for [place]. Overdue for MRI which was ordered but not scheduled so she will call to schedule this. Follow up in three months    [address] [facility].  Deisy Marshall

## 2018-05-02 NOTE — PATIENT INSTRUCTIONS

## 2018-05-27 ENCOUNTER — APPOINTMENT (OUTPATIENT)
Dept: CT IMAGING | Age: 53
End: 2018-05-27
Attending: EMERGENCY MEDICINE
Payer: SELF-PAY

## 2018-05-27 ENCOUNTER — HOSPITAL ENCOUNTER (EMERGENCY)
Age: 53
Discharge: HOME | End: 2018-05-28
Attending: EMERGENCY MEDICINE | Admitting: EMERGENCY MEDICINE
Payer: SELF-PAY

## 2018-05-27 DIAGNOSIS — K29.90 GASTRITIS AND DUODENITIS: Primary | ICD-10-CM

## 2018-05-27 LAB
ALBUMIN SERPL-MCNC: 4.2 G/DL (ref 3.5–5)
ALBUMIN/GLOB SERPL: 1.4 {RATIO} (ref 1.1–2.2)
ALP SERPL-CCNC: 149 U/L (ref 45–117)
ALT SERPL-CCNC: 33 U/L (ref 12–78)
ANION GAP SERPL CALC-SCNC: 10 MMOL/L (ref 5–15)
AST SERPL-CCNC: 49 U/L (ref 15–37)
BASOPHILS # BLD: 0 K/UL (ref 0–0.1)
BASOPHILS NFR BLD: 0 % (ref 0–1)
BILIRUB SERPL-MCNC: 0.4 MG/DL (ref 0.2–1)
BUN SERPL-MCNC: 10 MG/DL (ref 6–20)
BUN/CREAT SERPL: 12 (ref 12–20)
CALCIUM SERPL-MCNC: 8.7 MG/DL (ref 8.5–10.1)
CHLORIDE SERPL-SCNC: 102 MMOL/L (ref 97–108)
CO2 SERPL-SCNC: 27 MMOL/L (ref 21–32)
CREAT SERPL-MCNC: 0.84 MG/DL (ref 0.55–1.02)
DIFFERENTIAL METHOD BLD: ABNORMAL
EOSINOPHIL # BLD: 0.2 K/UL (ref 0–0.4)
EOSINOPHIL NFR BLD: 2 % (ref 0–7)
ERYTHROCYTE [DISTWIDTH] IN BLOOD BY AUTOMATED COUNT: 16.2 % (ref 11.5–14.5)
GLOBULIN SER CALC-MCNC: 3 G/DL (ref 2–4)
GLUCOSE SERPL-MCNC: 89 MG/DL (ref 65–100)
HCT VFR BLD AUTO: 38.5 % (ref 35–47)
HGB BLD-MCNC: 12.2 G/DL (ref 11.5–16)
LIPASE SERPL-CCNC: 118 U/L (ref 73–393)
LYMPHOCYTES # BLD: 1.3 K/UL (ref 0.8–3.5)
LYMPHOCYTES NFR BLD: 16 % (ref 12–49)
MCH RBC QN AUTO: 26.8 PG (ref 26–34)
MCHC RBC AUTO-ENTMCNC: 31.7 G/DL (ref 30–36.5)
MCV RBC AUTO: 84.6 FL (ref 80–99)
MONOCYTES # BLD: 0.7 K/UL (ref 0–1)
MONOCYTES NFR BLD: 9 % (ref 5–13)
NEUTS SEG # BLD: 5.6 K/UL (ref 1.8–8)
NEUTS SEG NFR BLD: 73 % (ref 32–75)
PLATELET # BLD AUTO: 299 K/UL (ref 150–400)
PMV BLD AUTO: 9.6 FL (ref 8.9–12.9)
POTASSIUM SERPL-SCNC: 3.2 MMOL/L (ref 3.5–5.1)
PROT SERPL-MCNC: 7.2 G/DL (ref 6.4–8.2)
RBC # BLD AUTO: 4.55 M/UL (ref 3.8–5.2)
SODIUM SERPL-SCNC: 139 MMOL/L (ref 136–145)
TROPONIN I SERPL-MCNC: <0.05 NG/ML
WBC # BLD AUTO: 7.7 K/UL (ref 3.6–11)
XXWBCSUS: 0

## 2018-05-27 PROCEDURE — 36415 COLL VENOUS BLD VENIPUNCTURE: CPT | Performed by: EMERGENCY MEDICINE

## 2018-05-27 PROCEDURE — 74011250637 HC RX REV CODE- 250/637: Performed by: EMERGENCY MEDICINE

## 2018-05-27 PROCEDURE — 96374 THER/PROPH/DIAG INJ IV PUSH: CPT

## 2018-05-27 PROCEDURE — 80053 COMPREHEN METABOLIC PANEL: CPT | Performed by: EMERGENCY MEDICINE

## 2018-05-27 PROCEDURE — 93005 ELECTROCARDIOGRAM TRACING: CPT

## 2018-05-27 PROCEDURE — 85025 COMPLETE CBC W/AUTO DIFF WBC: CPT | Performed by: EMERGENCY MEDICINE

## 2018-05-27 PROCEDURE — 83690 ASSAY OF LIPASE: CPT | Performed by: EMERGENCY MEDICINE

## 2018-05-27 PROCEDURE — 99285 EMERGENCY DEPT VISIT HI MDM: CPT

## 2018-05-27 PROCEDURE — 84484 ASSAY OF TROPONIN QUANT: CPT | Performed by: EMERGENCY MEDICINE

## 2018-05-27 PROCEDURE — 96361 HYDRATE IV INFUSION ADD-ON: CPT

## 2018-05-27 PROCEDURE — 74011636320 HC RX REV CODE- 636/320: Performed by: EMERGENCY MEDICINE

## 2018-05-27 PROCEDURE — 74011000250 HC RX REV CODE- 250: Performed by: EMERGENCY MEDICINE

## 2018-05-27 PROCEDURE — 74011250636 HC RX REV CODE- 250/636: Performed by: EMERGENCY MEDICINE

## 2018-05-27 PROCEDURE — 74177 CT ABD & PELVIS W/CONTRAST: CPT

## 2018-05-27 PROCEDURE — 96375 TX/PRO/DX INJ NEW DRUG ADDON: CPT

## 2018-05-27 RX ORDER — FENTANYL CITRATE 50 UG/ML
50 INJECTION, SOLUTION INTRAMUSCULAR; INTRAVENOUS
Status: COMPLETED | OUTPATIENT
Start: 2018-05-27 | End: 2018-05-27

## 2018-05-27 RX ORDER — FAMOTIDINE 10 MG/ML
20 INJECTION INTRAVENOUS
Status: COMPLETED | OUTPATIENT
Start: 2018-05-27 | End: 2018-05-27

## 2018-05-27 RX ADMIN — IOPAMIDOL 96 ML: 755 INJECTION, SOLUTION INTRAVENOUS at 23:44

## 2018-05-27 RX ADMIN — FENTANYL CITRATE 50 MCG: 50 INJECTION, SOLUTION INTRAMUSCULAR; INTRAVENOUS at 23:30

## 2018-05-27 RX ADMIN — LIDOCAINE HYDROCHLORIDE 40 ML: 20 SOLUTION ORAL; TOPICAL at 22:47

## 2018-05-27 RX ADMIN — FAMOTIDINE 20 MG: 10 INJECTION INTRAVENOUS at 22:45

## 2018-05-27 RX ADMIN — SODIUM CHLORIDE 1000 ML: 900 INJECTION, SOLUTION INTRAVENOUS at 22:44

## 2018-05-28 VITALS
OXYGEN SATURATION: 100 % | SYSTOLIC BLOOD PRESSURE: 189 MMHG | RESPIRATION RATE: 20 BRPM | HEIGHT: 66 IN | BODY MASS INDEX: 20.66 KG/M2 | TEMPERATURE: 97.8 F | HEART RATE: 73 BPM | WEIGHT: 128.53 LBS | DIASTOLIC BLOOD PRESSURE: 83 MMHG

## 2018-05-28 LAB — TROPONIN I BLD-MCNC: <0.04 NG/ML (ref 0–0.08)

## 2018-05-28 PROCEDURE — 84484 ASSAY OF TROPONIN QUANT: CPT

## 2018-05-28 PROCEDURE — 93005 ELECTROCARDIOGRAM TRACING: CPT

## 2018-05-28 PROCEDURE — 74011250637 HC RX REV CODE- 250/637: Performed by: EMERGENCY MEDICINE

## 2018-05-28 RX ORDER — POTASSIUM CHLORIDE 750 MG/1
20 TABLET, FILM COATED, EXTENDED RELEASE ORAL
Status: COMPLETED | OUTPATIENT
Start: 2018-05-28 | End: 2018-05-28

## 2018-05-28 RX ORDER — DICYCLOMINE HYDROCHLORIDE 20 MG/1
20 TABLET ORAL
Qty: 20 TAB | Refills: 0 | Status: SHIPPED | OUTPATIENT
Start: 2018-05-28 | End: 2018-11-01 | Stop reason: ALTCHOICE

## 2018-05-28 RX ORDER — OMEPRAZOLE 40 MG/1
40 CAPSULE, DELAYED RELEASE ORAL DAILY
Qty: 30 CAP | Refills: 0 | Status: SHIPPED | OUTPATIENT
Start: 2018-05-28 | End: 2018-08-01

## 2018-05-28 RX ORDER — SUCRALFATE 1 G/1
1 TABLET ORAL
Qty: 28 TAB | Refills: 0 | Status: SHIPPED | OUTPATIENT
Start: 2018-05-28 | End: 2018-06-04

## 2018-05-28 RX ADMIN — POTASSIUM CHLORIDE 20 MEQ: 750 TABLET, EXTENDED RELEASE ORAL at 00:25

## 2018-05-28 NOTE — ED TRIAGE NOTES
Pain upper abdomen that started an hour ago while moving some boxes; if I bear down and grunt it makes the pain better for a few seconds.  Denies nausea or vomiting

## 2018-05-28 NOTE — DISCHARGE INSTRUCTIONS
We hope that we have addressed all of your medical concerns. The examination and treatment you received in the Emergency Department were for an emergent problem and were not intended as complete care. It is important that you follow up with your healthcare provider(s) for ongoing care. If your symptoms worsen or do not improve as expected, and you are unable to reach your usual health care provider(s), you should return to the Emergency Department. Today's healthcare is undergoing tremendous change, and patient satisfaction surveys are one of the many tools to assess the quality of medical care. You may receive a survey from the "[employer]" regarding your experience in the Emergency Department. I hope that your experience has been completely positive, particularly the medical care that I provided. As such, please participate in the survey; anything less than excellent does not meet my expectations or intentions. [facility] and [facility] participate in nationally recognized quality of care measures. If your blood pressure is greater than 120/80, as reported below, we urge that you seek medical care to address the potential of high blood pressure, commonly known as hypertension. Hypertension can be hereditary or can be caused by certain medical conditions, pain, stress, or \"white coat syndrome. \"       Please make an appointment with your health care provider(s) for follow up of your Emergency Department visit. VITALS:   Patient Vitals for the past 8 hrs:   Temp Pulse Resp BP SpO2   05/28/18 0000 - 82 17 124/68 100 %   05/27/18 2300 - 73 9 100/86 98 %   05/27/18 2211 97.8 °F (36.6 °C) 77 24 135/77 100 %          Thank you for allowing us to provide you with medical care today. We realize that you have many choices for your emergency care needs. Please choose us in the future for any continued health care needs.       Regards, Jin Lenz MD    [facility], [facility].   Office: [phone]            Recent Results (from the past 24 hour(s))   EKG, 12 LEAD, INITIAL    Collection Time: 05/27/18 10:28 PM   Result Value Ref Range    Ventricular Rate 66 BPM    Atrial Rate 66 BPM    P-R Interval 146 ms    QRS Duration 94 ms    Q-T Interval 396 ms    QTC Calculation (Bezet) 415 ms    Calculated P Axis 80 degrees    Calculated R Axis 73 degrees    Calculated T Axis 63 degrees    Diagnosis       Normal sinus rhythm with sinus arrhythmia  Normal ECG  When compared with ECG of 29-FEB-2016 00:51,  Vent. rate has decreased BY  42 BPM     CBC WITH AUTOMATED DIFF    Collection Time: 05/27/18 10:37 PM   Result Value Ref Range    WBC 7.7 3.6 - 11.0 K/uL    RBC 4.55 3.80 - 5.20 M/uL    HGB 12.2 11.5 - 16.0 g/dL    HCT 38.5 35.0 - 47.0 %    MCV 84.6 80.0 - 99.0 FL    MCH 26.8 26.0 - 34.0 PG    MCHC 31.7 30.0 - 36.5 g/dL    RDW 16.2 (H) 11.5 - 14.5 %    PLATELET 688 571 - 134 K/uL    MPV 9.6 8.9 - 12.9 FL    NEUTROPHILS 73 32 - 75 %    LYMPHOCYTES 16 12 - 49 %    MONOCYTES 9 5 - 13 %    EOSINOPHILS 2 0 - 7 %    BASOPHILS 0 0 - 1 %    ABS. NEUTROPHILS 5.6 1.8 - 8.0 K/UL    ABS. LYMPHOCYTES 1.3 0.8 - 3.5 K/UL    ABS. MONOCYTES 0.7 0.0 - 1.0 K/UL    ABS. EOSINOPHILS 0.2 0.0 - 0.4 K/UL    ABS.  BASOPHILS 0.0 0.0 - 0.1 K/UL    DF AUTOMATED      XXWBCSUS 0     METABOLIC PANEL, COMPREHENSIVE    Collection Time: 05/27/18 10:37 PM   Result Value Ref Range    Sodium 139 136 - 145 mmol/L    Potassium 3.2 (L) 3.5 - 5.1 mmol/L    Chloride 102 97 - 108 mmol/L    CO2 27 21 - 32 mmol/L    Anion gap 10 5 - 15 mmol/L    Glucose 89 65 - 100 mg/dL    BUN 10 6 - 20 MG/DL    Creatinine 0.84 0.55 - 1.02 MG/DL    BUN/Creatinine ratio 12 12 - 20      GFR est AA >60 >60 ml/min/1.73m2    GFR est non-AA >60 >60 ml/min/1.73m2    Calcium 8.7 8.5 - 10.1 MG/DL    Bilirubin, total 0.4 0.2 - 1.0 MG/DL    ALT (SGPT) 33 12 - 78 U/L    AST (SGOT) 49 (H) 15 - 37 U/L Alk. phosphatase 149 (H) 45 - 117 U/L    Protein, total 7.2 6.4 - 8.2 g/dL    Albumin 4.2 3.5 - 5.0 g/dL    Globulin 3.0 2.0 - 4.0 g/dL    A-G Ratio 1.4 1.1 - 2.2     LIPASE    Collection Time: 05/27/18 10:37 PM   Result Value Ref Range    Lipase 118 73 - 393 U/L   TROPONIN I    Collection Time: 05/27/18 10:37 PM   Result Value Ref Range    Troponin-I, Qt. <0.05 <0.05 ng/mL   POC TROPONIN-I    Collection Time: 05/28/18 12:45 AM   Result Value Ref Range    Troponin-I (POC) <0.04 0.00 - 0.08 ng/mL   EKG, 12 LEAD, SUBSEQUENT    Collection Time: 05/28/18 12:47 AM   Result Value Ref Range    Ventricular Rate 73 BPM    Atrial Rate 73 BPM    P-R Interval 152 ms    QRS Duration 92 ms    Q-T Interval 422 ms    QTC Calculation (Bezet) 464 ms    Calculated P Axis 82 degrees    Calculated R Axis 75 degrees    Calculated T Axis 65 degrees    Diagnosis       Normal sinus rhythm  Normal ECG  When compared with ECG of 27-MAY-2018 22:28,  MANUAL COMPARISON REQUIRED, DATA IS UNCONFIRMED         Ct Abd Pelv W Cont    Result Date: 5/28/2018  EXAM: CT ABDOMEN PELVIS WITH CONTRAST INDICATION: Epigastric pain. . COMPARISON: 9/28/2015. CONTRAST: 96 mL of Isovue-370. TECHNIQUE: Multislice helical CT was performed from the diaphragm to the symphysis pubis during uneventful rapid bolus intravenous contrast administration. Oral contrast was not administered. Contiguous 5 mm axial images were reconstructed and lung and soft tissue windows were generated. Coronal and sagittal reformations were generated. CT dose reduction was achieved through use of a standardized protocol tailored for this examination and automatic exposure control for dose modulation. FINDINGS: LOWER CHEST: The visualized portions of the lung bases are clear. ABDOMEN: Liver: The liver is normal in size and contour with no focal abnormality. Gallbladder and bile ducts: The gallbladder is absent. There is no biliary duct dilatation. Spleen: No abnormality.  Pancreas: No abnormality. Adrenal glands: No abnormality. Kidneys: No abnormality. There are bilateral nonobstructing renal calculi. PELVIS: Reproductive organs: The uterus is normal. Bladder: No abnormality. BOWEL AND MESENTERY: The small bowel is normal.  There is no mesenteric mass or adenopathy. The appendix is normal. PERITONEUM: There is no ascites or free intraperitoneal air. RETROPERITONEUM: The aorta is atherosclerotic and tapers without aneurysm. There is no retroperitoneal adenopathy or mass. There is no pelvic mass or adenopathy. BONES AND SOFT TISSUES: The bones and soft tissues of the abdominal wall are within normal limits. IMPRESSION: 1. Bilateral nonobstructing renal calculi. 2. Atherosclerotic abdominal aorta without aneurysm. 3. Status post cholecystectomy. Gastritis: Care Instructions  Your Care Instructions    Gastritis is a sore and upset stomach. It happens when something irritates the stomach lining. Many things can cause it. These include an infection such as the flu or something you ate or drank. Medicines or a sore on the lining of the stomach (ulcer) also can cause it. Your belly may bloat and ache. You may belch, vomit, and feel sick to your stomach. You should be able to relieve the problem by taking medicine. And it may help to change your diet. If gastritis lasts, your doctor may prescribe medicine. Follow-up care is a key part of your treatment and safety. Be sure to make and go to all appointments, and call your doctor if you are having problems. It's also a good idea to know your test results and keep a list of the medicines you take. How can you care for yourself at home? · If your doctor prescribed antibiotics, take them as directed. Do not stop taking them just because you feel better. You need to take the full course of antibiotics. · Be safe with medicines. If your doctor prescribed medicine to decrease stomach acid, take it as directed.  Call your doctor if you think you are having a problem with your medicine. · Do not take any other medicine, including over-the-counter pain relievers, without talking to your doctor first.  · If your doctor recommends over-the-counter medicine to reduce stomach acid, such as Pepcid AC, Prilosec, Tagamet HB, or Zantac 75, follow the directions on the label. · Drink plenty of fluids (enough so that your urine is light yellow or clear like water) to prevent dehydration. Choose water and other caffeine-free clear liquids. If you have kidney, heart, or liver disease and have to limit fluids, talk with your doctor before you increase the amount of fluids you drink. · Limit how much alcohol you drink. · Avoid coffee, tea, cola drinks, chocolate, and other foods with caffeine. They increase stomach acid. When should you call for help? Call 911 anytime you think you may need emergency care. For example, call if:  ? · You vomit blood or what looks like coffee grounds. ? · You pass maroon or very bloody stools. ?Call your doctor now or seek immediate medical care if:  ? · You start breathing fast and have not produced urine in the last 8 hours. ? · You cannot keep fluids down. ? Watch closely for changes in your health, and be sure to contact your doctor if:  ? · You do not get better as expected. Where can you learn more? Go to http://meghana-raul.info/. Enter [id number] in the search box to learn more about \"Gastritis: Care Instructions. \"  Current as of: May 12, 2017  Content Version: 11.4  © 9870-3457 [employer]. Care instructions adapted under license by [employer] (which disclaims liability or warranty for this information). If you have questions about a medical condition or this instruction, always ask your healthcare professional. [address] any warranty or liability for your use of this information.

## 2018-05-28 NOTE — ED PROVIDER NOTES
HPI Comments: 48 yo WF with hx migraine, fibromyalgia presents with c/o epigastric pain beginning at 8 pm. Reports it is sharp and waxes and wanes. Feels like a muscle cramp. Denies fever, n/v, diarrhea. Had nl bm today. No blood in stool no black stool. Denies cp or sob. Denies pain radiating to the back. Denies these sx ever coming on with exertion or while moving furniture earlier today. Reports last meal was a late lunch. States this feels like when she had gallstones in the past but had gb removed many years ago. Reports she has abd pain often and doesn't eat for days    She did smoke marijuana today  + smoker  Denies etoh  Reports in pain mgmt but has not been taking narcotics for months. States she does not take nsaids or bc's daily. Takes occasional tylenol    Patient is a 47 y.o. female presenting with abdominal pain. The history is provided by the patient. Abdominal Pain    Pertinent negatives include no fever, no diarrhea, no nausea, no vomiting and no chest pain. Past Medical History:   Diagnosis Date    Delivery normal     x2    Fibromyalgia     Headache(784.0)     Migraine headache     Multiple sclerosis (HCC)     Sarcoidosis     Sun-damaged skin     Tanning bed exposure        Past Surgical History:   Procedure Laterality Date    ABDOMEN SURGERY PROC UNLISTED      galbladder removal    HX CHOLECYSTECTOMY  1990    HX GYN      tubal ligation    HX GYN      tubial     HX OTHER SURGICAL      tubal ligation    HX TUBAL LIGATION  1994         Family History:   Problem Relation Age of Onset    Thyroid Disease Sister     Thyroid Disease Sister     Other Mother      Steva Lemme rusty's disease 62    Heart Disease Father     Cancer Father      lymphoma       Social History     Social History    Marital status: SINGLE     Spouse name: N/A    Number of children: N/A    Years of education: N/A     Occupational History    Not on file.      Social History Main Topics    Smoking status: Current Every Day Smoker     Packs/day: 1.00     Years: 30.00     Types: Cigarettes    Smokeless tobacco: Never Used    Alcohol use No    Drug use: Yes     Special: Marijuana    Sexual activity: Yes     Partners: Male     Birth control/ protection: Surgical     Other Topics Concern    Not on file     Social History Narrative    ** Merged History Encounter **              ALLERGIES: Review of patient's allergies indicates no known allergies. Review of Systems   Constitutional: Negative for fever. Respiratory: Negative for shortness of breath. Cardiovascular: Negative for chest pain. Gastrointestinal: Positive for abdominal pain. Negative for diarrhea, nausea and vomiting. Genitourinary:        No longer menstruates   All other systems reviewed and are negative. Vitals:    05/27/18 2211   BP: 135/77   Pulse: 77   Resp: 24   Temp: 97.8 °F (36.6 °C)   SpO2: 100%   Weight: 58.3 kg (128 lb 8.5 oz)   Height: 5' 6\" (1.676 m)            Physical Exam   Constitutional: She is oriented to person, place, and time. She appears well-developed and well-nourished. No distress. HENT:   Head: Normocephalic and atraumatic. Eyes: Conjunctivae are normal.   Neck: Normal range of motion. Neck supple. Cardiovascular: Normal rate, regular rhythm, normal heart sounds and intact distal pulses. Exam reveals no friction rub. No murmur heard. Pulmonary/Chest: Effort normal and breath sounds normal. No respiratory distress. She has no wheezes. She has no rales. She exhibits no tenderness. Abdominal: Soft. Bowel sounds are normal. She exhibits no distension. There is tenderness. There is no rebound and no guarding. Epigastric ttp   Musculoskeletal: Normal range of motion. Neurological: She is alert and oriented to person, place, and time. Coordination normal.   Skin: Skin is warm and dry. She is not diaphoretic. No pallor. Psychiatric: She has a normal mood and affect.  Her behavior is normal.   Nursing note and vitals reviewed. MDM  Number of Diagnoses or Management Options  Gastritis and duodenitis:   Diagnosis management comments: Does not sound cardiac though with smoking will check ekg and troponin  Check lipase and consider pud, given marijuana use may have abd pain from that as well though discussed with pt this would be a dx of exclusion       Amount and/or Complexity of Data Reviewed  Clinical lab tests: ordered and reviewed  Tests in the radiology section of CPT®: ordered and reviewed  Obtain history from someone other than the patient: yes (daughter)  Independent visualization of images, tracings, or specimens: yes (ekg)    Patient Progress  Patient progress: stable        ED Course       Procedures    ED EKG interpretation:  Rhythm: normal sinus rhythm; and regular . Rate (approx.): 70; Axis: normal; P wave: normal; QRS interval: normal ; ST/T wave: non-specific changes  EKG documented by Zahra Aldana MD, [email or web address], as interpreted by Zahra Aldana MD, ED MD.     11:20 PM  No improvement with gi cocktail still tearful reports feel better if keeps \"croe enganged\" check ct as she is tearful and in a lot of pain    ED EKG interpretation:  Rhythm: normal sinus rhythm; and regular . Rate (approx.): 70; Axis: normal; P wave: normal; QRS interval: normal ; ST/T wave: non-specific changes  EKG documented by Zahra Aldana MD, [email or web address], as interpreted by Zahra Aldana MD, ED MD.    1:08 AM  Spoke with pt at length. Discussed diet and smoking cessation. Tylenol only for pain no nsaid or aspirin. Suspect gastritis or ulcer based on fact that she cant eat often without pain though she is at risk of malignancy. Also discussed I ruled out heart attack not disease and if sx worse she will need to return. She agrees with plan.  Reports having colonoscopy at age 49 which was nl at [facility]

## 2018-05-28 NOTE — ED NOTES
Patient medicated for pain. IV fluids infusing. Patient placed on cardiac monitor. Plan of care discussed. Patient's family at bedside. Will continue to monitor.

## 2018-05-29 LAB
ATRIAL RATE: 66 BPM
ATRIAL RATE: 73 BPM
CALCULATED P AXIS, ECG09: 80 DEGREES
CALCULATED P AXIS, ECG09: 82 DEGREES
CALCULATED R AXIS, ECG10: 73 DEGREES
CALCULATED R AXIS, ECG10: 75 DEGREES
CALCULATED T AXIS, ECG11: 63 DEGREES
CALCULATED T AXIS, ECG11: 65 DEGREES
DIAGNOSIS, 93000: NORMAL
DIAGNOSIS, 93000: NORMAL
P-R INTERVAL, ECG05: 146 MS
P-R INTERVAL, ECG05: 152 MS
Q-T INTERVAL, ECG07: 396 MS
Q-T INTERVAL, ECG07: 422 MS
QRS DURATION, ECG06: 92 MS
QRS DURATION, ECG06: 94 MS
QTC CALCULATION (BEZET), ECG08: 415 MS
QTC CALCULATION (BEZET), ECG08: 464 MS
VENTRICULAR RATE, ECG03: 66 BPM
VENTRICULAR RATE, ECG03: 73 BPM

## 2018-06-01 DIAGNOSIS — F98.8 ATTENTION DEFICIT DISORDER (ADD) WITHOUT HYPERACTIVITY: ICD-10-CM

## 2018-06-01 RX ORDER — DEXTROAMPHETAMINE SACCHARATE, AMPHETAMINE ASPARTATE, DEXTROAMPHETAMINE SULFATE AND AMPHETAMINE SULFATE 2.5; 2.5; 2.5; 2.5 MG/1; MG/1; MG/1; MG/1
10 TABLET ORAL 2 TIMES DAILY
Qty: 60 TAB | Refills: 0 | Status: SHIPPED | OUTPATIENT
Start: 2018-06-01 | End: 2018-07-02 | Stop reason: SDUPTHER

## 2018-06-01 NOTE — TELEPHONE ENCOUNTER
----- Message from Joseph Ace sent at 6/1/2018  9:42 AM EDT -----  Regarding: BELLA Wong/Refill  Pt called concerning a refill on her Aderrall and would like a call when she can come pick it up.  Pt best contact number [phone]

## 2018-07-02 ENCOUNTER — TELEPHONE (OUTPATIENT)
Dept: NEUROLOGY | Age: 53
End: 2018-07-02

## 2018-07-02 DIAGNOSIS — F98.8 ATTENTION DEFICIT DISORDER (ADD) WITHOUT HYPERACTIVITY: ICD-10-CM

## 2018-07-02 RX ORDER — DEXTROAMPHETAMINE SACCHARATE, AMPHETAMINE ASPARTATE, DEXTROAMPHETAMINE SULFATE AND AMPHETAMINE SULFATE 2.5; 2.5; 2.5; 2.5 MG/1; MG/1; MG/1; MG/1
10 TABLET ORAL 2 TIMES DAILY
Qty: 60 TAB | Refills: 0 | Status: SHIPPED | OUTPATIENT
Start: 2018-07-02 | End: 2018-08-01 | Stop reason: SDUPTHER

## 2018-07-02 NOTE — TELEPHONE ENCOUNTER
----- Message from [facility] sent at 7/2/2018  9:08 AM EDT -----  Regarding: NP Marvin/Refill  Pt needs a refill on her \"Adderall 10mg\"  Pt is leaving for vacation on July 4th and wants her medicine before she leaves? Pt best contact [phone] and please leave a message.

## 2018-07-03 ENCOUNTER — TELEPHONE (OUTPATIENT)
Dept: NEUROLOGY | Age: 53
End: 2018-07-03

## 2018-07-03 DIAGNOSIS — R79.89 ABNORMAL TSH: Primary | ICD-10-CM

## 2018-07-03 NOTE — TELEPHONE ENCOUNTER
Please call and let her know that her last thyroid test done for the [facility] monitoring was abnormal. I need to recheck this. I put in a lab order (Routing comment) per NP.      Left Message - on home number listed letting her know of the previous message sent from the NP.

## 2018-07-16 ENCOUNTER — TELEPHONE (OUTPATIENT)
Dept: NEUROLOGY | Age: 53
End: 2018-07-16

## 2018-07-16 DIAGNOSIS — E05.90 HYPERTHYROIDISM: Primary | ICD-10-CM

## 2018-07-16 NOTE — TELEPHONE ENCOUNTER
Reviewed most recent labs done through the [facility] program for [place]. This demonstrated a low TSH. I would like to recheck this with a free T4. Due to the higher risk for potential thyroid issues and cancer with [place], I would also like for her to be seen by endocrinology. Per NP. Left Message - on home number listed letting her know I was calling and asking for a call back.

## 2018-07-16 NOTE — PROGRESS NOTES
Reviewed most recent labs done through the [facility] program for [place]. This demonstrated a low TSH. I would like to recheck this with a free T4. Due to the higher risk for potential thyroid issues and cancer with [place], I would also like for her to be seen by endocrinology.

## 2018-07-17 ENCOUNTER — TELEPHONE (OUTPATIENT)
Dept: NEUROLOGY | Age: 53
End: 2018-07-17

## 2018-07-17 NOTE — TELEPHONE ENCOUNTER
----- Message from Rome Oreilly sent at 7/17/2018 11:35 AM EDT -----  Regarding: BELLA Wong/Telephone  Pt returing a call to [facility]. [phone]. Please leave v/m if unavailable.

## 2018-08-01 ENCOUNTER — OFFICE VISIT (OUTPATIENT)
Dept: NEUROLOGY | Age: 53
End: 2018-08-01

## 2018-08-01 VITALS
BODY MASS INDEX: 20.57 KG/M2 | SYSTOLIC BLOOD PRESSURE: 100 MMHG | HEART RATE: 81 BPM | DIASTOLIC BLOOD PRESSURE: 70 MMHG | WEIGHT: 128 LBS | OXYGEN SATURATION: 97 % | RESPIRATION RATE: 20 BRPM | HEIGHT: 66 IN

## 2018-08-01 DIAGNOSIS — G25.81 RESTLESS LEG SYNDROME: ICD-10-CM

## 2018-08-01 DIAGNOSIS — F51.01 PRIMARY INSOMNIA: ICD-10-CM

## 2018-08-01 DIAGNOSIS — F98.8 ATTENTION DEFICIT DISORDER (ADD) WITHOUT HYPERACTIVITY: ICD-10-CM

## 2018-08-01 DIAGNOSIS — G89.4 CHRONIC PAIN SYNDROME: ICD-10-CM

## 2018-08-01 DIAGNOSIS — G35 MS (MULTIPLE SCLEROSIS) (HCC): Primary | ICD-10-CM

## 2018-08-01 RX ORDER — GABAPENTIN 300 MG/1
600 CAPSULE ORAL 3 TIMES DAILY
Qty: 360 CAP | Refills: 2 | Status: SHIPPED | OUTPATIENT
Start: 2018-08-01 | End: 2019-02-07 | Stop reason: SDUPTHER

## 2018-08-01 RX ORDER — DEXTROAMPHETAMINE SACCHARATE, AMPHETAMINE ASPARTATE, DEXTROAMPHETAMINE SULFATE AND AMPHETAMINE SULFATE 2.5; 2.5; 2.5; 2.5 MG/1; MG/1; MG/1; MG/1
10 TABLET ORAL 2 TIMES DAILY
Qty: 60 TAB | Refills: 0 | Status: SHIPPED | OUTPATIENT
Start: 2018-08-01 | End: 2018-08-29 | Stop reason: SDUPTHER

## 2018-08-01 NOTE — MR AVS SNAPSHOT
[address] [facility] [address] [facility] [address] [address] [phone] Patient: Cindy Odom MRN: [id number] :1965 Visit Information Date & Time Provider Department Dept. Phone Encounter #  
 2018 11:30 AM Crista Wilson NP [facility] Neurology [facility] [phone] [id number] Follow-up Instructions Return in about 3 months (around 2018). Your Appointments 8/3/2018  4:00 PM  
New Patient with Lillian Jamil MD  
[facility]ology [address] [facility]) Appt Note: np est pcp thyroid and t-cells count is off [facility] 18  
 [address] [facility] [address]  
[phone]  
  
   
 [address] [place] [id number] Upcoming Health Maintenance Date Due Pneumococcal 19-64 Medium Risk (1 of 1 - PPSV23) 3/1/1985 DTaP/Tdap/Td series (1 - Tdap) 3/1/1987 BREAST CANCER SCRN MAMMOGRAM 3/1/2016 FOBT Q 1 YEAR AGE 50-75 3/1/2016 PAP AKA CERVICAL CYTOLOGY 2016 Influenza Age 5 to Adult 2018 Allergies as of 2018  Review Complete On: 2018 By: Crista Wilson NP No Known Allergies Current Immunizations  Reviewed on 2017 No immunizations on file. Not reviewed this visit You Were Diagnosed With   
  
 Codes Comments MS (multiple sclerosis) ([facility]    -  Primary ICD-10-CM: G35 
ICD-9-CM: 642 Primary insomnia     ICD-10-CM: F51.01 
ICD-9-CM: 307.42 Vitals BP Pulse Resp Height(growth percentile) Weight(growth percentile) SpO2  
 100/70 81 20 5' 6\" (1.676 m) 128 lb (58.1 kg) 97% BMI OB Status Smoking Status 20.66 kg/m2 Menopause Current Every Day Smoker Vitals History BMI and BSA Data Body Mass Index Body Surface Area  
 20.66 kg/m 2 1.64 m 2 Preferred Pharmacy Pharmacy Name Phone [facility] Lopez [facility], [address] [phone] Your Updated Medication List  
  
   
This list is accurate as of 8/1/18 12:36 PM.  Always use your most recent med list.  
  
  
  
  
 dextroamphetamine-amphetamine 10 mg tablet Commonly known as:  ADDERALL Take 1 Tab (10 mg total) by mouth two (2) times a dayEarliest Fill Date: 7/2/18. Max Daily Amount: 20 mg  
  
 dicyclomine 20 mg tablet Commonly known as:  BENTYL Take 1 Tab by mouth every six (6) hours as needed (abdominal cramps) for up to 20 doses. gabapentin 300 mg capsule Commonly known as:  NEURONTIN Take 2 Caps by mouth three (3) times daily. valACYclovir 500 mg tablet Commonly known as:  VALTREX Take 1 Tab by mouth two (2) times a day. Follow-up Instructions Return in about 3 months (around 11/1/2018). Patient Instructions A Healthy Lifestyle: Care Instructions Your Care Instructions A healthy lifestyle can help you feel good, stay at a healthy weight, and have plenty of energy for both work and play. A healthy lifestyle is something you can share with your whole family. A healthy lifestyle also can lower your risk for serious health problems, such as high blood pressure, heart disease, and diabetes. You can follow a few steps listed below to improve your health and the health of your family. Follow-up care is a key part of your treatment and safety. Be sure to make and go to all appointments, and call your doctor if you are having problems. It's also a good idea to know your test results and keep a list of the medicines you take. How can you care for yourself at home? · Do not eat too much sugar, fat, or fast foods. You can still have dessert and treats now and then. The goal is moderation. · Start small to improve your eating habits.  Pay attention to portion sizes, drink less juice and soda pop, and eat more fruits and vegetables. ¨ Eat a healthy amount of food. A 3-ounce serving of meat, for example, is about the size of a deck of cards. Fill the rest of your plate with vegetables and whole grains. ¨ Limit the amount of soda and sports drinks you have every day. Drink more water when you are thirsty. ¨ Eat at least 5 servings of fruits and vegetables every day. It may seem like a lot, but it is not hard to reach this goal. A serving or helping is 1 piece of fruit, 1 cup of vegetables, or 2 cups of leafy, raw vegetables. Have an apple or some carrot sticks as an afternoon snack instead of a candy bar. Try to have fruits and/or vegetables at every meal. 
· Make exercise part of your daily routine. You may want to start with simple activities, such as walking, bicycling, or slow swimming. Try to be active 30 to 60 minutes every day. You do not need to do all 30 to 60 minutes all at once. For example, you can exercise 3 times a day for 10 or 20 minutes. Moderate exercise is safe for most people, but it is always a good idea to talk to your doctor before starting an exercise program. 
· Keep moving. Cristian Cardosoberg the lawn, work in the garden, or eyeQ. Take the stairs instead of the elevator at work. · If you smoke, quit. People who smoke have an increased risk for heart attack, stroke, cancer, and other lung illnesses. Quitting is hard, but there are ways to boost your chance of quitting tobacco for good. ¨ Use nicotine gum, patches, or lozenges. ¨ Ask your doctor about stop-smoking programs and medicines. ¨ Keep trying. In addition to reducing your risk of diseases in the future, you will notice some benefits soon after you stop using tobacco. If you have shortness of breath or asthma symptoms, they will likely get better within a few weeks after you quit. · Limit how much alcohol you drink.  Moderate amounts of alcohol (up to 2 drinks a day for men, 1 drink a day for women) are okay. But drinking too much can lead to liver problems, high blood pressure, and other health problems. Family health If you have a family, there are many things you can do together to improve your health. · Eat meals together as a family as often as possible. · Eat healthy foods. This includes fruits, vegetables, lean meats and dairy, and whole grains. · Include your family in your fitness plan. Most people think of activities such as jogging or tennis as the way to fitness, but there are many ways you and your family can be more active. Anything that makes you breathe hard and gets your heart pumping is exercise. Here are some tips: 
¨ Walk to do errands or to take your child to school or the bus. ¨ Go for a family bike ride after dinner instead of watching TV. Where can you learn more? Go to http://meghana-raul.info/. Enter D302 in the search box to learn more about \"A Healthy Lifestyle: Care Instructions. \" Current as of: December 7, 2017 Content Version: 11.7 © 5795-4726 [employer]. Care instructions adapted under license by [employer] (which disclaims liability or warranty for this information). If you have questions about a medical condition or this instruction, always ask your healthcare professional. [address] any warranty or liability for your use of this information. Introducing [facility]! Dear Shahzad Matos: 
Thank you for requesting a [employer] account. Our records indicate that you already have an active [employer] account. You can access your account anytime at [email or web address] Did you know that you can access your hospital and ER discharge instructions at any time in [employer]? You can also review all of your test results from your hospital stay or ER visit. Additional Information If you have questions, please visit the Frequently Asked Questions section of the [employer] website at [email or web address] Remember, [employer] is NOT to be used for urgent needs. For medical emergencies, dial 911. Now available from your iPhone and Android! Please provide this summary of care documentation to your next provider. Your primary care clinician is listed as NONE. If you have any questions after today's visit, please call [phone].

## 2018-08-01 NOTE — PROGRESS NOTES
Date:  18     Name:  Jared Hewitt  :  1965  MRN:  [id number]     PCP:  None    Chief Complaint   Patient presents with    Multiple Sclerosis     HISTORY OF PRESENT ILLNESS: Follow up for RRMS. Through the [facility]S program, it has been found that her thyroid function is off. She was referred to endocrinology for further evaluation of that issue and is to see them at the end of this week. Otherwise, she feels that she has been doing okay. She is not sure if she is more sensitive to the heat because of the thyroid issue or if it is the MS or a little of both. If she is overheated, she is a little dizzy. She does admit that she may not be drinking as much water and she needs. Her sleep is off as well and she has been having a hard time falling and staying asleep. Except as noted above, denies  fever, chills, cough. No CP or SOB. No dysuria, loss of bowel or bladder control. No Weight loss. Appetite good. Sleeping well. No sweats. No edema. No bruising or bleeding. No nausea or vomit. No diarrhea. No frequency, urgency, No depressive sxs. No anxiety. Denies sore throat, nasal congestion, nasal discharge, epistaxis, tinnitus, hearing loss, back pain, muscle pain, or joint pain. Current Outpatient Prescriptions   Medication Sig    dextroamphetamine-amphetamine (ADDERALL) 10 mg tablet Take 1 Tab (10 mg total) by mouth two (2) times a dayEarliest Fill Date: 18. Max Daily Amount: 20 mg    dicyclomine (BENTYL) 20 mg tablet Take 1 Tab by mouth every six (6) hours as needed (abdominal cramps) for up to 20 doses.  valACYclovir (VALTREX) 500 mg tablet Take 1 Tab by mouth two (2) times a day.  gabapentin (NEURONTIN) 300 mg capsule Take 2 Caps by mouth three (3) times daily. No current facility-administered medications for this visit.       No Known Allergies  Past Medical History:   Diagnosis Date    Delivery normal     x2    Fibromyalgia     Headache(784.0)     Migraine headache     Multiple sclerosis ([facility]     Sarcoidosis     Sun-damaged skin     Tanning bed exposure      Past Surgical History:   Procedure Laterality Date    ABDOMEN SURGERY PROC UNLISTED      galbladder removal    HX CHOLECYSTECTOMY  1990    HX GYN      tubal ligation    HX GYN      tubial     HX OTHER SURGICAL      tubal ligation    HX TUBAL LIGATION  1994     Social History     Social History    Marital status: SINGLE     Spouse name: N/A    Number of children: N/A    Years of education: N/A     Occupational History    Not on file. Social History Main Topics    Smoking status: Current Every Day Smoker     Packs/day: 1.00     Years: 30.00     Types: Cigarettes    Smokeless tobacco: Never Used    Alcohol use No    Drug use: Yes     Special: Marijuana    Sexual activity: Yes     Partners: Male     Birth control/ protection: Surgical     Other Topics Concern    Not on file     Social History Narrative    ** Merged History Encounter **          Family History   Problem Relation Age of Onset    Thyroid Disease Sister     Thyroid Disease Sister     Other Mother      Mendel Trang rusty's disease [address] [facility]    Heart Disease Father     Cancer Father      lymphoma       PHYSICAL EXAMINATION:    Visit Vitals    /70    Pulse 81    Resp 20    Ht 5' 6\" (1.676 m)    Wt 58.1 kg (128 lb)    SpO2 97%    BMI 20.66 kg/m2     General: Well defined, nourished, and groomed individual in no acute distress.    Neck: Supple, nontender, no bruits    Heart: Regular rate and rhythm    Lungs: Clear to auscultation bilaterally    Musculoskeletal: Extremities revealed no edema and had full range of motion of joints.  There is some pain with palpation with at the greater trochanteric bursa bilaterally.    Psych: Good mood and bright affect    NEUROLOGICAL EXAMINATION:    Mental Status: Alert and oriented to person, place, and time with recent and remote memory intact.    Cranial Nerves:    II, III, IV, VI: Visual acuity grossly intact. Visual fields are normal.    Pupils are equal, round, and reactive to light and accommodation.    Extra-ocular movements are full and fluid. Fundoscopic exam was benign, no ptosis or nystagmus.    V-XII: Hearing is grossly intact. Facial features are symmetric, with normal sensation and strength. The palate rises symmetrically and the tongue protrudes midline. Sternocleidomastoids 5/5.    Motor Examination: Normal tone, bulk, very trace weakness noted in the left upper extremity  Coordination: No resting or intention tremor    Gait and Station: Steady while walking. No muscle wasting or fasiculations noted.    Reflexes: DTRs 2+ throughout. ASSESSMENT AND PLAN    ICD-10-CM ICD-9-CM    1. MS (multiple sclerosis) ([facility] G35 340    2. Primary insomnia F51.01 307.42    3. Attention deficit disorder (ADD) without hyperactivity F98.8 314.00 dextroamphetamine-amphetamine (ADDERALL) 10 mg tablet   4. Chronic pain syndrome G89.4 338.4 gabapentin (NEURONTIN) 300 mg capsule   5. Restless leg syndrome G25.81 333.94      Overall, the MS appears stable. Some of her complaints of heat intolerance and issues with sleep may be related to her thyroid and she is to see the endocrinologist at the end of this week. Otherwise, the ADD issues are stable on the Adderall. She was cautioned to take this in the morning and to avoid taking it later in the day as this will only add to her issues with insomnia. Her chronic pain is manageable with gabapentin and self medication with occasional marijuana. She was counseled regarding the use of marijuana and its current illegal status. Follow up in three months  [facility].  [facility]

## 2018-08-01 NOTE — PATIENT INSTRUCTIONS

## 2018-08-01 NOTE — PROGRESS NOTES
Multiple Sclerosis- she thinks she has been doing okay  She does think her thyroid has been throwing her off   Sensitive to heat more so than she used to be, dizzy   Sees the endocrinology this Friday   Not has been able to sleep because she feels so anxiety which she thinks is probably related to her thyroid as well

## 2018-08-29 DIAGNOSIS — F98.8 ATTENTION DEFICIT DISORDER (ADD) WITHOUT HYPERACTIVITY: ICD-10-CM

## 2018-08-29 RX ORDER — DEXTROAMPHETAMINE SACCHARATE, AMPHETAMINE ASPARTATE, DEXTROAMPHETAMINE SULFATE AND AMPHETAMINE SULFATE 2.5; 2.5; 2.5; 2.5 MG/1; MG/1; MG/1; MG/1
10 TABLET ORAL 2 TIMES DAILY
Qty: 60 TAB | Refills: 0 | Status: SHIPPED | OUTPATIENT
Start: 2018-08-29 | End: 2018-10-01 | Stop reason: SDUPTHER

## 2018-09-09 DIAGNOSIS — G89.4 CHRONIC PAIN SYNDROME: ICD-10-CM

## 2018-09-10 RX ORDER — AMITRIPTYLINE HYDROCHLORIDE 100 MG/1
TABLET, FILM COATED ORAL
Qty: 2 TAB | Refills: 2 | Status: SHIPPED | OUTPATIENT
Start: 2018-09-10 | End: 2018-11-26 | Stop reason: SDUPTHER

## 2018-10-16 ENCOUNTER — OFFICE VISIT (OUTPATIENT)
Dept: ENDOCRINOLOGY | Age: 53
End: 2018-10-16

## 2018-10-16 VITALS
SYSTOLIC BLOOD PRESSURE: 110 MMHG | TEMPERATURE: 97.3 F | HEART RATE: 68 BPM | DIASTOLIC BLOOD PRESSURE: 66 MMHG | WEIGHT: 134 LBS | OXYGEN SATURATION: 100 % | RESPIRATION RATE: 14 BRPM | HEIGHT: 66 IN | BODY MASS INDEX: 21.53 KG/M2

## 2018-10-16 DIAGNOSIS — R53.83 FATIGUE, UNSPECIFIED TYPE: Primary | ICD-10-CM

## 2018-10-16 DIAGNOSIS — Z79.52 CURRENT CHRONIC USE OF SYSTEMIC STEROIDS: ICD-10-CM

## 2018-10-16 NOTE — MR AVS SNAPSHOT
[address] [facility] [address] [facility] [address] 
[phone] Patient: Rey Escobar MRN: [id number] :1965 Visit Information Date & Time Provider Department Dept. Phone Encounter #  
 10/16/2018  2:00 PM Sangeetha Villatoro MD [facility] [phone] [id number] Follow-up Instructions Return if symptoms worsen or fail to improve. Your Appointments 2018 11:30 AM  
Any with Lisa Fox NP  [facility] ([address] [place] [address]) Appt Note: 3mo f/up MS cr  
 [place]  [facility] [address] [facility] [address] [address] [phone]  
  
   
 [place]  [address] [address] [address] Upcoming Health Maintenance Date Due Pneumococcal 19-64 Medium Risk (1 of 1 - PPSV23) 3/1/1985 DTaP/Tdap/Td series (1 - Tdap) 3/1/1987 Shingrix Vaccine Age 50> (1 of 2) 3/1/2016 BREAST CANCER SCRN MAMMOGRAM 3/1/2016 FOBT Q 1 YEAR AGE 50-75 3/1/2016 PAP AKA CERVICAL CYTOLOGY 2016 Influenza Age 5 to Adult 2018 Allergies as of 10/16/2018  Review Complete On: 10/16/2018 By: Mita Burgos LPN No Known Allergies Current Immunizations  Reviewed on 2017 No immunizations on file. Not reviewed this visit You Were Diagnosed With   
  
 Codes Comments Fatigue, unspecified type    -  Primary ICD-10-CM: R53.83 ICD-9-CM: 780.79 Current chronic use of systemic steroids     ICD-10-CM: Z79.52 
ICD-9-CM: V58.65 Vitals BP Pulse Temp Resp Height(growth percentile) Weight(growth percentile) 110/66 (BP 1 Location: Left arm, BP Patient Position: Sitting) 68 97.3 °F (36.3 °C) (Oral) 14 5' 6\" (1.676 m) 134 lb (60.8 kg) SpO2 BMI OB Status Smoking Status 100% 21.63 kg/m2 Menopause Current Every Day Smoker Vitals History BMI and BSA Data Body Mass Index Body Surface Area 21.63 kg/m 2 1.68 m 2 Preferred Pharmacy Pharmacy Name Phone Mike Lopez [address], [address] [phone] Your Updated Medication List  
  
   
This list is accurate as of 10/16/18  2:29 PM.  Always use your most recent med list.  
  
  
  
  
 amitriptyline 100 mg tablet Commonly known as:  ELAVIL TAKE ONE TABLET BY MOUTH ONCE NIGHTLY  
  
 dextroamphetamine-amphetamine 10 mg tablet Commonly known as:  ADDERALL Take 1 Tab (10 mg total) by mouth two (2) times a day. Max Daily Amount: 20 mg  
  
 dicyclomine 20 mg tablet Commonly known as:  BENTYL Take 1 Tab by mouth every six (6) hours as needed (abdominal cramps) for up to 20 doses. gabapentin 300 mg capsule Commonly known as:  NEURONTIN Take 2 Caps by mouth three (3) times daily. valACYclovir 500 mg tablet Commonly known as:  VALTREX Take 1 Tab by mouth two (2) times a day. Follow-up Instructions Return if symptoms worsen or fail to improve. To-Do List   
 10/17/2018 Lab:  ACTH   
  
 10/17/2018 Lab:  CORTISOL, AM   
  
 10/17/2018 Lab:  T4, FREE   
  
 10/17/2018 Lab:  TSH 3RD GENERATION [facility]! Dear Falguni Leon: 
Thank you for requesting a [employer] account. Our records indicate that you already have an active [employer] account. You can access your account anytime at [email or web address] Did you know that you can access your hospital and ER discharge instructions at any time in [employer]? You can also review all of your test results from your hospital stay or ER visit. Additional Information If you have questions, please visit the Frequently Asked Questions section of the [employer] website at [email or web address] Remember, [employer] is NOT to be used for urgent needs. For medical emergencies, dial 911. Now available from your iPhone and Android! Please provide this summary of care documentation to your next provider. Your primary care clinician is listed as NONE. If you have any questions after today's visit, please call [phone].

## 2018-10-16 NOTE — PROGRESS NOTES
Anni Capellan is a 47 y.o. female here for Chief Complaint Patient presents with  New Patient  
  referred by BELLA Bello for Thyroid 1. Have you been to the ER, urgent care clinic since your last visit? Hospitalized since your last visit? -n/a 2. Have you seen or consulted any other health care providers outside of the [address] [place] [address] since your last visit?   Include any pap smears or colon screening.-n/a

## 2018-10-16 NOTE — PROGRESS NOTES
[facility] DIABETES AND ENDOCRINOLOGY Lolly Blount MD 
 
    [address] 78 [phone] Fax [phone] Patient Information Date:10/16/2018 Name : Ally Scruggs 47 y.o.    
YOB: 1965 Referred by: None History of present illness Ally Scruggs is a 47 y.o. female  here for Evaluation and management of possible thyroid disorder. She has MS and managed by a neurologist. She was on monoclonal antibody  last year, intermittently got supraphysiologic dose of glucocorticoids. She also has underlying sarcoidosis for which she was on glucocorticoids in the past 
 
Complains of heat intolerance. Reports more anxiety. She is on Adderall and Amitriptyline along with Gabapentin. She is taking medications as needed, but not on a consistent basis. She takes amitriptyline whenever she cannot sleep for insomnia. Weight is fluctuating, she is post menopausal. Last thyroid function tests from last year were normal. 
 
No family history of thyroiditis. No goiter. Wt Readings from Last 3 Encounters:  
10/16/18 134 lb (60.8 kg) 08/01/18 128 lb (58.1 kg) 05/27/18 128 lb 8.5 oz (58.3 kg) Past Medical History:  
Diagnosis Date  Delivery normal   
 x2  Fibromyalgia  Headache(784.0)  Migraine headache  Multiple sclerosis ([address]  Sarcoidosis  Sun-damaged skin  Tanning bed exposure Current Outpatient Prescriptions Medication Sig  
 dextroamphetamine-amphetamine (ADDERALL) 10 mg tablet Take 1 Tab (10 mg total) by mouth two (2) times a day. Max Daily Amount: 20 mg  
 amitriptyline (ELAVIL) 100 mg tablet TAKE ONE TABLET BY MOUTH ONCE NIGHTLY  gabapentin (NEURONTIN) 300 mg capsule Take 2 Caps by mouth three (3) times daily.  dicyclomine (BENTYL) 20 mg tablet Take 1 Tab by mouth every six (6) hours as needed (abdominal cramps) for up to 20 doses.  valACYclovir (VALTREX) 500 mg tablet Take 1 Tab by mouth two (2) times a day. No current facility-administered medications for this visit. No Known Allergies Review of Systems:  All 10 systems  reviewed and are negative other than mentioned in HPI Physical Examination: 
Blood pressure 110/66, pulse 68, temperature 97.3 °F (36.3 °C), temperature source Oral, resp. rate 14, height 5' 6\" (1.676 m), weight 134 lb (60.8 kg), SpO2 100 %. - General: pleasant, no distress, good eye contact 
- HEENT: no exopthalmos, no periorbital edema, no scleral/conjunctival injection, EOMI, no lid lag or stare 
- Neck: supple, no thyromegaly, no nodules,no lymphadenopathy - Cardiovascular: regular, normal rate, normal S1 and S2, 
- Respiratory: clear to auscultation bilaterally - Gastrointestinal: soft, nontender, nondistended, BS + 
- Musculoskeletal: no proximal muscle weakness in upper or lower extremities - Integumentary: no tremors, no edema, 
- Neurological:alert and oriented - Psychiatric: normal mood and affect Data Reviewed:  
 
 
 
Assessment/Plan:  
 
Fatigue Heat intolerance Fluctuating weight She was on Lemtrada, steroids last year . No prior history of thyroid disorders. Check TSH, free T4. Does not have all symptoms of hyperthyroidism. Given chronic glucocorticoids, check ACTH, cortisol to r/o adrenal insufficiency She is taking Adderall as needed along with Amitriptyline ,asked to check with prescribing physician if that is okay with them. Menopausal hot flashes also contributing to symptoms. Low suspicion for thyroid disorders at this time. Await labs. There are no Patient Instructions on file for this visit. Follow-up Disposition: Not on File Patient /caregiver verbalized understanding Voice-recognition software was used to generate this report, which may result in some phonetic-based errors in the grammar and contents.   Even though attempts were made to correct all the mistakes, some may have been missed and remained in the body of the report.

## 2018-11-01 ENCOUNTER — TELEPHONE (OUTPATIENT)
Dept: NEUROLOGY | Age: 53
End: 2018-11-01

## 2018-11-01 ENCOUNTER — OFFICE VISIT (OUTPATIENT)
Dept: NEUROLOGY | Age: 53
End: 2018-11-01

## 2018-11-01 VITALS
RESPIRATION RATE: 20 BRPM | WEIGHT: 134 LBS | HEIGHT: 66 IN | HEART RATE: 96 BPM | DIASTOLIC BLOOD PRESSURE: 90 MMHG | SYSTOLIC BLOOD PRESSURE: 154 MMHG | BODY MASS INDEX: 21.53 KG/M2 | OXYGEN SATURATION: 96 %

## 2018-11-01 DIAGNOSIS — G35 MS (MULTIPLE SCLEROSIS) (HCC): Primary | ICD-10-CM

## 2018-11-01 DIAGNOSIS — F41.9 ANXIETY: ICD-10-CM

## 2018-11-01 DIAGNOSIS — G89.4 CHRONIC PAIN SYNDROME: ICD-10-CM

## 2018-11-01 DIAGNOSIS — F98.8 ATTENTION DEFICIT DISORDER (ADD) WITHOUT HYPERACTIVITY: ICD-10-CM

## 2018-11-01 DIAGNOSIS — G35 MS (MULTIPLE SCLEROSIS) (HCC): ICD-10-CM

## 2018-11-01 RX ORDER — DULOXETIN HYDROCHLORIDE 30 MG/1
CAPSULE, DELAYED RELEASE ORAL
Qty: 60 CAP | Refills: 3 | Status: SHIPPED | OUTPATIENT
Start: 2018-11-01 | End: 2019-02-07 | Stop reason: SDUPTHER

## 2018-11-01 RX ORDER — DEXTROAMPHETAMINE SACCHARATE, AMPHETAMINE ASPARTATE, DEXTROAMPHETAMINE SULFATE AND AMPHETAMINE SULFATE 2.5; 2.5; 2.5; 2.5 MG/1; MG/1; MG/1; MG/1
10 TABLET ORAL 2 TIMES DAILY
Qty: 60 TAB | Refills: 0 | Status: SHIPPED | OUTPATIENT
Start: 2018-11-01 | End: 2018-12-11 | Stop reason: SDUPTHER

## 2018-11-01 RX ORDER — ASPIRIN 81 MG/1
162 TABLET ORAL
COMMUNITY
End: 2020-09-14

## 2018-11-01 NOTE — PROGRESS NOTES
Hasn't smoked pot since August  
In a lot of pain, has a short fuse with a lot of stuff and is very stressed

## 2018-11-01 NOTE — PROGRESS NOTES
Date:  18 Name:  Cheryle Nash 
:  1965 MRN:  [id number] PCP:  None Chief Complaint Patient presents with  Multiple Sclerosis CC- Patient presents for follow up of RRMS. HISTORY OF PRESENT ILLNESS: 
 
Since her last visit on 18, the patient reports increased intermittent pins and needles sensations in the bottom of her feet. She also continues to have intermittent aching in her left 1st MCP joint (which she thinks may be due to arthritis) and intermittent muscle aching in her distal legs. She denies any numbness or tingling in her arms or legs. She continues to have mild weakness in her left leg and left arm and there has been no change in this. For the past several years she has had constant aching, squeezing and stabbing pain 4-10/10  around her rib cage. She denies any SOB or cough. She takes Elavil 100 mg QHS which helps her sleep and she denies any side effects. She also takes Gabapentin 600 mg TID which helps with her shooting random nerve pains, but she feels it sometimes makes her a little spacey. She takes Adderall 10mg BID as needed. Patient reports she stopped using marijuana on 18 and she is asking today about getting a prescription pain medication. The patient has had two Lemtrada treatments, her second treatment was completed on 17. She continues to have monthly blood and urine testing through the [facility] program- her blood is drawn by a nurse who comes to her home and the testing is done by Celine Callahan. The results of patient's most recent labwork (patient reports done on 10/5/18) was reported to this provider as reviewed by Geo Deluca NP and sent to be scanned into patient's EMR. The patient reports she has been off Valtrex for the past 9 weeks. Her last CD 4 helper scanned into EMR if from 18 and was 221.    
  
She continues to have anxiety which she describes as a \"low level general aggravation and low level anxiety\". She has had panic attacks in the past, and she may have had one about a week ago. Her triggers for feeling anxious and panic attacks are loud noise and too much activity/commotion around her. A week ago she was around her triggers and it made her feel anxious, palms sweaty, heart racing, and rapid breathing. She removed herself form the situation and did slow deep breathing and felt fine after a few minutes. She also reports feeling mild depression due to her pain. She denies any suicidal ideation. She last saw a therapist about 10 years ago. The patient reports history of feeling occasional heart palpitations during her menses, and about 2-3 weeks ago she was feeling them more often than usual.  She has not noticed any heart palpations for the past 3 days. She denies any dizziness or fainting. She started taking 2 baby aspirin per day a few weeks ago. She had salmonella years ago and she had an arrhythmia when sick with this. When she was pregnant she had a  heart murmur that resolved. The patient saw an endocrinologist, Dr. Lisa Mcgregor, on 10/16/18 for possible thyroid disorder and heat intolerance and blood work was ordered  and other blood work ordered including TSH, free T4, ACTH and cortisol, but it has not been done yet. The patient asks if she should get a Flu shot. ROS- Review of Systems-  history obtained from patient. General ROS: negative Psychological ROS: see HPI HEENT ROS: negative, denies headaches, denies vision changes or eye pain Hematological and Lymphatic ROS: negative Endocrine ROS: see HPI Respiratory ROS: negative, denies SOB Cardiovascular ROS: see HPI Gastrointestinal ROS: negative Genito-Urinary ROS: negative Musculoskeletal ROS: see HPI Neurological ROS: see HPI, denies vertigo Dermatological ROS: negative Current Outpatient Medications Medication Sig  
  aspirin delayed-release 81 mg tablet Take 162 mg by mouth daily as needed.  dextroamphetamine-amphetamine (ADDERALL) 10 mg tablet Take 1 Tab (10 mg total) by mouth two (2) times a day. Max Daily Amount: 20 mg  
 amitriptyline (ELAVIL) 100 mg tablet TAKE ONE TABLET BY MOUTH ONCE NIGHTLY  gabapentin (NEURONTIN) 300 mg capsule Take 2 Caps by mouth three (3) times daily. No current facility-administered medications for this visit. No Known Allergies Past Medical History:  
Diagnosis Date  Delivery normal   
 x2  Fibromyalgia  Headache(784.0)  Migraine headache  Multiple sclerosis ([facility] [address]  Sarcoidosis  Sun-damaged skin  Tanning bed exposure Past Surgical History:  
Procedure Laterality Date  ABDOMEN SURGERY PROC UNLISTED    
 galbladder removal  
 HX CHOLECYSTECTOMY  1990  
 HX GYN    
 tubal ligation  HX GYN    
 tubial   
 HX OTHER SURGICAL    
 tubal ligation [address] Social History Socioeconomic History  Marital status: SINGLE Spouse name: Not on file  Number of children: Not on file  Years of education: Not on file  Highest education level: Not on file Social Needs  Financial resource strain: Not on file  Food insecurity - worry: Not on file  Food insecurity - inability: Not on file  Transportation needs - medical: Not on file  Transportation needs - non-medical: Not on file Occupational History  Not on file Tobacco Use  Smoking status: Current Every Day Smoker Packs/day: 1.00 Years: 30.00 Pack years: 30.00 Types: Cigarettes  Smokeless tobacco: Never Used Substance and Sexual Activity  Alcohol use: No  
 Drug use: Yes Types: Marijuana  Sexual activity: Yes  
  Partners: Male Birth control/protection: Surgical  
Other Topics Concern  Not on file Social History Narrative ** Merged History Encounter ** Family History Problem Relation Age of Onset  Thyroid Disease Sister  Thyroid Disease Sister [facility] Other Mother Kostas Hein rusty's disease 62  
 Heart Disease Father  Cancer Father   
     lymphoma PHYSICAL EXAMINATION:   
Visit Vitals /90 Pulse 96 Resp 20 Ht 5' 6\" (1.676 m) Wt 60.8 kg (134 lb) SpO2 96% BMI 21.63 kg/m² Physical Exam -  
  
General - Patient is alert in NAD, well nourished and well groomed and color normal.  
HEENT- head is normocephalic, sclera clear,  nose and throat are clear Neck- supple, no carotid bruit Chest - CTA A/P/L, full breath sounds bilaterally Heart -palpated radial pulse had a very brief pause (skipped beat) twice in 30 seconds, but when heart auscultated later in exam it was RRR for one minute and  no murmur Abdomen- not distended Musculoskeletal- normal posture, FROM of joints Extremities - warm and no edema Skin- no rashes or lesions Psychiatric- normal mood and bright affect 
  
Neurological- Alert and oriented to person, place and time. Speech is clear- no aphasia or dysarthria. Recent and remote memory appear intact. Cranial nerves II-XII are intact- visual acuity and visual fields are grossly intact, Fundoscopic- no papilledema, PERRLA, EOMS intact, no nystagmus or ptosis, facial sensation normal and masseter strength intact, no facial asymmetry, facial movements are symmetrical and normal strength, hearing intact, palate rises symmetrically, sternocleidomastoid and trapezius strength 5/5 bilaterally, tongue midline. Motor System- strength 5/5 to upper and lower extremities bilaterally except for 4+/5 left leg , normal muscle bulk and tone, no tremor. Sensation-  decreased sensation to temperature in feet extending to about 2 inches distal to knees and also slightly decreased sensation to temperature in hands up to wrist.  
 
Gait-  mildly unsteady due to favors left leg Coordination - finger to nose accurate, GRUPO intact, Romberg negative, no pronator drift. Reflexes- 2+ to upper and lower extremities bilaterally.  
  
Above history and exam findings reviewed with Irina Hoffman NP who also spoke with patient and confirmed exam findings and assisted with formulating plan below. ASSESSMENT AND PLAN 
  ICD-10-CM ICD-9-CM 1. MS (multiple sclerosis) ([facility] [address] G35 340   
2. Chronic pain syndrome G89.4 338.4 TOXASSURE SELECT 13 (MW)  
3. Anxiety F41.9 300.00 DULoxetine (CYMBALTA) 30 mg capsule 1. For anxiety and pain patient prescribed Cymbalta 30 mg daily today. 2.  Advised patient to continue to have monthly blood and urine tests per REMS protocol. 3. Advise to also have blood work done ordered by endocrinologist.  
 
4. Intermittent heart palpitations may be due to anxiety, if they do not resolve or if they worsen in any way will refer patient to cardiology. 5.  Patient advised she should get the inactivated injected flu vaccine, but she should not get the nasal flu vaccine (due to it is a live attenuated vaccine). 6. Urine drug screen ordered today. If urine drug screen is negative and patient's pain is not controlled on Cymbalta, will consider other pain management medications at next visit if needed. 7. Follow up in 3 months. EDUARD Pérez- [facility] This note will not be viewable in [address]

## 2018-11-01 NOTE — PATIENT INSTRUCTIONS
A Healthy Lifestyle: Care Instructions Your Care Instructions A healthy lifestyle can help you feel good, stay at a healthy weight, and have plenty of energy for both work and play. A healthy lifestyle is something you can share with your whole family. A healthy lifestyle also can lower your risk for serious health problems, such as high blood pressure, heart disease, and diabetes. You can follow a few steps listed below to improve your health and the health of your family. Follow-up care is a key part of your treatment and safety. Be sure to make and go to all appointments, and call your doctor if you are having problems. It's also a good idea to know your test results and keep a list of the medicines you take. How can you care for yourself at home? · Do not eat too much sugar, fat, or fast foods. You can still have dessert and treats now and then. The goal is moderation. · Start small to improve your eating habits. Pay attention to portion sizes, drink less juice and soda pop, and eat more fruits and vegetables. ? Eat a healthy amount of food. A 3-ounce serving of meat, for example, is about the size of a deck of cards. Fill the rest of your plate with vegetables and whole grains. ? Limit the amount of soda and sports drinks you have every day. Drink more water when you are thirsty. ? Eat at least 5 servings of fruits and vegetables every day. It may seem like a lot, but it is not hard to reach this goal. A serving or helping is 1 piece of fruit, 1 cup of vegetables, or 2 cups of leafy, raw vegetables. Have an apple or some carrot sticks as an afternoon snack instead of a candy bar. Try to have fruits and/or vegetables at every meal. 
· Make exercise part of your daily routine. You may want to start with simple activities, such as walking, bicycling, or slow swimming. Try to be active 30 to 60 minutes every day.  You do not need to do all 30 to 60 minutes all at once. For example, you can exercise 3 times a day for 10 or 20 minutes. Moderate exercise is safe for most people, but it is always a good idea to talk to your doctor before starting an exercise program. 
· Keep moving. Jereld Mcardle the lawn, work in the garden, or [email or web address]. Take the stairs instead of the elevator at work. · If you smoke, quit. People who smoke have an increased risk for heart attack, stroke, cancer, and other lung illnesses. Quitting is hard, but there are ways to boost your chance of quitting tobacco for good. ? Use nicotine gum, patches, or lozenges. ? Ask your doctor about stop-smoking programs and medicines. ? Keep trying. In addition to reducing your risk of diseases in the future, you will notice some benefits soon after you stop using tobacco. If you have shortness of breath or asthma symptoms, they will likely get better within a few weeks after you quit. · Limit how much alcohol you drink. Moderate amounts of alcohol (up to 2 drinks a day for men, 1 drink a day for women) are okay. But drinking too much can lead to liver problems, high blood pressure, and other health problems. Family health If you have a family, there are many things you can do together to improve your health. · Eat meals together as a family as often as possible. · Eat healthy foods. This includes fruits, vegetables, lean meats and dairy, and whole grains. · Include your family in your fitness plan. Most people think of activities such as jogging or tennis as the way to fitness, but there are many ways you and your family can be more active. Anything that makes you breathe hard and gets your heart pumping is exercise. Here are some tips: 
? Walk to do errands or to take your child to school or the bus. 
? Go for a family bike ride after dinner instead of watching TV. Where can you learn more? Go to http://meghana-raul.info/. Enter L547 in the search box to learn more about \"A Healthy Lifestyle: Care Instructions. \" Current as of: December 7, 2017 Content Version: 11.8 © 8138-9973 [employer]wise, [employer]. Care instructions adapted under license by [employer] (which disclaims liability or warranty for this information). If you have questions about a medical condition or this instruction, always ask your healthcare professional. [place] [address] any warranty or liability for your use of this information.

## 2018-11-08 LAB — DRUGS UR: NORMAL

## 2018-11-13 RX ORDER — OXYCODONE HYDROCHLORIDE 10 MG/1
10 TABLET ORAL
Qty: 60 TAB | Refills: 0 | Status: SHIPPED | OUTPATIENT
Start: 2018-11-13 | End: 2018-12-11 | Stop reason: SDUPTHER

## 2018-11-19 NOTE — PROGRESS NOTES
I have reviewed the documentation provided by the nurse practitioner, Neris CHAPA, and we have discussed her findings and the clinical impression. I have formulated with her the proposed management plans for this patient. Additionally,  I have personally evaluated the patient to verify the history and to confirm physical findings. Below are my additional comments: 
Patient presented today for follow-up evaluation of relapsing remitting multiple sclerosis. She continues to have chronic pain as a secondary issue and is requesting to be restarted on her pain management contract. We discussed her pain management contract again. In the past, this had been revoked secondary to use of illicit drugs, specifically marijuana. Prior to reinitiation of chronic pain management, she will be sent for urine drug screen. If this is negative, then we can resume with her previous pain management regimen. However, it was expressly discussed that if she were to violate her pain management contract again that she made need to be dismissed from the practice. Otherwise, she did have some increased issue with intermittent palpitations. This seems to occur primarily when she is stressed out so there is some question if there is an anxiety component. We discussed treatment options and she was started on Cymbalta 30 mg daily for 1 week then she is to increase this to 60 mg daily. This may also have some benefit with regard to pain management. Purpose of potential side effects were discussed and she has verbalized understanding. She will continue with the [facility] protocol for Lemtrada. Follow-up in 3 months or sooner if needed. Myriam Pereira

## 2018-11-26 DIAGNOSIS — G89.4 CHRONIC PAIN SYNDROME: ICD-10-CM

## 2018-11-26 NOTE — TELEPHONE ENCOUNTER
----- Message from Jarrett Golden sent at 11/26/2018 11:55 AM EST -----  Regarding: Ashley/Refill  Pt called requesting a refill for the medication amitriptyline. Pt use the [address] [place] [address] Pt best contact number is [phone] .

## 2018-11-27 RX ORDER — AMITRIPTYLINE HYDROCHLORIDE 100 MG/1
TABLET, FILM COATED ORAL
Qty: 30 TAB | Refills: 2 | Status: SHIPPED | OUTPATIENT
Start: 2018-11-27 | End: 2019-02-07 | Stop reason: SDUPTHER

## 2018-12-11 DIAGNOSIS — G89.4 CHRONIC PAIN SYNDROME: ICD-10-CM

## 2018-12-11 DIAGNOSIS — G35 MS (MULTIPLE SCLEROSIS) (HCC): ICD-10-CM

## 2018-12-11 DIAGNOSIS — F98.8 ATTENTION DEFICIT DISORDER (ADD) WITHOUT HYPERACTIVITY: ICD-10-CM

## 2018-12-12 RX ORDER — DEXTROAMPHETAMINE SACCHARATE, AMPHETAMINE ASPARTATE, DEXTROAMPHETAMINE SULFATE AND AMPHETAMINE SULFATE 2.5; 2.5; 2.5; 2.5 MG/1; MG/1; MG/1; MG/1
10 TABLET ORAL 2 TIMES DAILY
Qty: 60 TAB | Refills: 0 | Status: SHIPPED | OUTPATIENT
Start: 2018-12-12 | End: 2019-01-10 | Stop reason: SDUPTHER

## 2018-12-12 RX ORDER — OXYCODONE HYDROCHLORIDE 10 MG/1
10 TABLET ORAL
Qty: 60 TAB | Refills: 0 | Status: SHIPPED | OUTPATIENT
Start: 2018-12-12 | End: 2019-01-10 | Stop reason: SDUPTHER

## 2019-01-08 ENCOUNTER — TELEPHONE (OUTPATIENT)
Dept: NEUROLOGY | Age: 54
End: 2019-01-08

## 2019-01-08 DIAGNOSIS — F98.8 ATTENTION DEFICIT DISORDER (ADD) WITHOUT HYPERACTIVITY: ICD-10-CM

## 2019-01-08 DIAGNOSIS — G89.4 CHRONIC PAIN SYNDROME: ICD-10-CM

## 2019-01-08 DIAGNOSIS — G35 MS (MULTIPLE SCLEROSIS) (HCC): ICD-10-CM

## 2019-01-08 RX ORDER — OXYCODONE HYDROCHLORIDE 10 MG/1
10 TABLET ORAL
Qty: 60 TAB | Refills: 0 | Status: CANCELLED | OUTPATIENT
Start: 2019-01-08

## 2019-01-08 NOTE — TELEPHONE ENCOUNTER
----- Message from Marisol Yu sent at 1/8/2019 12:06 PM EST -----  Regarding: BELLA Wong/ telephone  The pt is requesting a callback regarding a new medication she is taking.       Best contact number is [phone]

## 2019-01-08 NOTE — TELEPHONE ENCOUNTER
----- Message from Chilo Siddiqi sent at 1/8/2019  9:52 AM EST -----  Regarding: BELLA Wong/rx refill  Pt (p) [phone], pt needs a rx refill for her Adderall and her Roxicodone , pt will  the rx when ready     Pt said can leave a message if she does not answer

## 2019-01-10 DIAGNOSIS — F98.8 ATTENTION DEFICIT DISORDER (ADD) WITHOUT HYPERACTIVITY: ICD-10-CM

## 2019-01-10 DIAGNOSIS — G89.4 CHRONIC PAIN SYNDROME: ICD-10-CM

## 2019-01-10 DIAGNOSIS — G35 MS (MULTIPLE SCLEROSIS) (HCC): ICD-10-CM

## 2019-01-10 RX ORDER — OXYCODONE HYDROCHLORIDE 10 MG/1
10 TABLET ORAL
Qty: 60 TAB | Refills: 0 | Status: SHIPPED | OUTPATIENT
Start: 2019-01-10 | End: 2019-01-10 | Stop reason: SDUPTHER

## 2019-01-10 RX ORDER — OXYCODONE HYDROCHLORIDE 10 MG/1
10 TABLET ORAL
Qty: 60 TAB | Refills: 0 | Status: SHIPPED | OUTPATIENT
Start: 2019-01-10 | End: 2019-02-07 | Stop reason: SDUPTHER

## 2019-01-10 RX ORDER — DEXTROAMPHETAMINE SACCHARATE, AMPHETAMINE ASPARTATE, DEXTROAMPHETAMINE SULFATE AND AMPHETAMINE SULFATE 2.5; 2.5; 2.5; 2.5 MG/1; MG/1; MG/1; MG/1
10 TABLET ORAL 2 TIMES DAILY
Qty: 60 TAB | Refills: 0 | Status: SHIPPED | OUTPATIENT
Start: 2019-01-10 | End: 2019-02-07 | Stop reason: SDUPTHER

## 2019-01-10 RX ORDER — DEXTROAMPHETAMINE SACCHARATE, AMPHETAMINE ASPARTATE, DEXTROAMPHETAMINE SULFATE AND AMPHETAMINE SULFATE 2.5; 2.5; 2.5; 2.5 MG/1; MG/1; MG/1; MG/1
10 TABLET ORAL 2 TIMES DAILY
Qty: 60 TAB | Refills: 0 | Status: SHIPPED | OUTPATIENT
Start: 2019-01-10 | End: 2019-01-10 | Stop reason: SDUPTHER

## 2019-01-10 NOTE — TELEPHONE ENCOUNTER
Pt calling in ref to picking up script from office, hoping it will be ready today as pt is going away Friday am. Checking status  Best # [phone]

## 2019-01-18 ENCOUNTER — TELEPHONE (OUTPATIENT)
Dept: NEUROLOGY | Age: 54
End: 2019-01-18

## 2019-02-07 ENCOUNTER — OFFICE VISIT (OUTPATIENT)
Dept: NEUROLOGY | Age: 54
End: 2019-02-07

## 2019-02-07 VITALS
DIASTOLIC BLOOD PRESSURE: 88 MMHG | SYSTOLIC BLOOD PRESSURE: 136 MMHG | HEART RATE: 104 BPM | OXYGEN SATURATION: 98 % | HEIGHT: 66 IN | BODY MASS INDEX: 21.63 KG/M2 | RESPIRATION RATE: 20 BRPM

## 2019-02-07 DIAGNOSIS — G89.4 CHRONIC PAIN SYNDROME: ICD-10-CM

## 2019-02-07 DIAGNOSIS — F41.9 ANXIETY: ICD-10-CM

## 2019-02-07 DIAGNOSIS — F98.8 ATTENTION DEFICIT DISORDER (ADD) WITHOUT HYPERACTIVITY: ICD-10-CM

## 2019-02-07 DIAGNOSIS — G35 MS (MULTIPLE SCLEROSIS) (HCC): ICD-10-CM

## 2019-02-07 RX ORDER — GABAPENTIN 300 MG/1
600 CAPSULE ORAL 3 TIMES DAILY
Qty: 360 CAP | Refills: 2 | Status: SHIPPED | OUTPATIENT
Start: 2019-02-07 | End: 2019-07-18 | Stop reason: SDUPTHER

## 2019-02-07 RX ORDER — OXYCODONE HYDROCHLORIDE 10 MG/1
10 TABLET ORAL
Qty: 60 TAB | Refills: 0 | Status: SHIPPED | OUTPATIENT
Start: 2019-02-07 | End: 2019-03-06 | Stop reason: SDUPTHER

## 2019-02-07 RX ORDER — DULOXETIN HYDROCHLORIDE 60 MG/1
60 CAPSULE, DELAYED RELEASE ORAL DAILY
Qty: 30 CAP | Refills: 2 | Status: SHIPPED | OUTPATIENT
Start: 2019-02-07 | End: 2019-06-01 | Stop reason: SDUPTHER

## 2019-02-07 RX ORDER — AMITRIPTYLINE HYDROCHLORIDE 100 MG/1
TABLET, FILM COATED ORAL
Qty: 30 TAB | Refills: 2 | Status: SHIPPED | OUTPATIENT
Start: 2019-02-07 | End: 2019-07-09 | Stop reason: SDUPTHER

## 2019-02-07 RX ORDER — DEXTROAMPHETAMINE SACCHARATE, AMPHETAMINE ASPARTATE, DEXTROAMPHETAMINE SULFATE AND AMPHETAMINE SULFATE 2.5; 2.5; 2.5; 2.5 MG/1; MG/1; MG/1; MG/1
10 TABLET ORAL 2 TIMES DAILY
Qty: 60 TAB | Refills: 0 | Status: SHIPPED | OUTPATIENT
Start: 2019-02-07 | End: 2019-03-06 | Stop reason: SDUPTHER

## 2019-02-07 NOTE — PROGRESS NOTES
Date:  19     Name:  Fran Merchant  :  1965  MRN:  [id number]     PCP:  None    Chief Complaint   Patient presents with    Multiple Sclerosis     HISTORY OF PRESENT ILLNESS: Follow up for RRMS. She has seen endocrinology due to the hypothyroidism but she has not been able to get the labs drawn that the endocrinologist ordered. Her last TSH through the [facility]S was normal. This was just redrawn yesterday as well so will see what this reveals. She started the Cymbalta and states that she is feeling a little calmer and she is not feeling as angry all the time. She does feel this has helped her mood. Her joint pains are still an issue and the weather seems to cause this to be worse. The Oxycodone does seem to help but her Medicaid will only pay for a limited number of them and she is not able to fill the entire script with the Medicaid. As such, she does have to pay cash for it. She did reapply for disability. Otherwise, she has not had any new neurological symptoms or exacerbation. She denies having any significant issues with her bowel or bladder. No changes in her hearing or vision. She does continue to have some fatigue and chronic attentional deficit issues. These are managed with the Adderall when she does take it. She indicates she does not feel that she needs to take it every day. It largely depends on what she needs to do. Except as noted above, denies  fever, chills, cough. No CP or SOB. No Weight loss. Appetite good. Sleeping well. No sweats. No edema. No bruising or bleeding. No nausea or vomit. No diarrhea. No frequency, urgency, No depressive sxs. No anxiety. Denies sore throat, nasal congestion, nasal discharge, epistaxis, tinnitus, hearing loss, back pain, muscle pain       Current Outpatient Medications   Medication Sig    dextroamphetamine-amphetamine (ADDERALL) 10 mg tablet Take 1 Tab (10 mg total) by mouth two (2) times a day.   Max Daily Amount: 20 mg    oxyCODONE IR (ROXICODONE) 10 mg tab immediate release tablet Take 1 Tab by mouth every four (4) hours as needed for Pain. Max Daily Amount: 60 mg.    amitriptyline (ELAVIL) 100 mg tablet TAKE ONE TABLET BY MOUTH ONCE NIGHTLY    aspirin delayed-release 81 mg tablet Take 162 mg by mouth daily as needed.  DULoxetine (CYMBALTA) 30 mg capsule Take one capsule by mouth daily for one week, and then increase to two capsules daily.  gabapentin (NEURONTIN) 300 mg capsule Take 2 Caps by mouth three (3) times daily. No current facility-administered medications for this visit.       No Known Allergies  Past Medical History:   Diagnosis Date    Delivery normal     x2    Fibromyalgia     Headache(784.0)     Migraine headache     Multiple sclerosis (HCC)     Sarcoidosis     Sun-damaged skin     Tanning bed exposure      Past Surgical History:   Procedure Laterality Date    ABDOMEN SURGERY PROC UNLISTED      galbladder removal    HX CHOLECYSTECTOMY  1990    HX GYN      tubal ligation    HX GYN      tubial     HX OTHER SURGICAL      tubal ligation    HX TUBAL LIGATION  1994     Social History     Socioeconomic History    Marital status: SINGLE     Spouse name: Not on file    Number of children: Not on file    Years of education: Not on file    Highest education level: Not on file   Social Needs    Financial resource strain: Not on file    Food insecurity - worry: Not on file    Food insecurity - inability: Not on file   [employer] needs - medical: Not on file   [employer] needs - non-medical: Not on file   Occupational History    Not on file   Tobacco Use    Smoking status: Current Every Day Smoker     Packs/day: 1.00     Years: 30.00     Pack years: 30.00     Types: Cigarettes    Smokeless tobacco: Never Used   Substance and Sexual Activity    Alcohol use: No    Drug use: Yes     Types: Marijuana    Sexual activity: Yes     Partners: Male     Birth control/protection: Surgical Other Topics Concern    Not on file   Social History Narrative    ** Merged History Encounter **          Family History   Problem Relation Age of Onset    Thyroid Disease Sister     Thyroid Disease Sister     Other Mother         Lorangelina Hidalgo rusty's disease 62    Heart Disease Father     Cancer Father         lymphoma       PHYSICAL EXAMINATION:    Visit Vitals  /88   Pulse (!) 104   Resp 20   Ht 5' 6\" (1.676 m)   SpO2 98%   BMI 21.63 kg/m²     General: Well defined, nourished, and groomed individual in no acute distress.    Neck: Supple, nontender, no bruits    Heart: Regular rate and rhythm    Lungs: Clear to auscultation bilaterally    Musculoskeletal: Extremities revealed no edema and had full range of motion of joints. There is some pain with palpation with at the greater trochanteric bursa bilaterally.    Psych: Good mood and bright affect    NEUROLOGICAL EXAMINATION:    Mental Status: Alert and oriented to person, place, and time with recent and remote memory intact.    Cranial Nerves:    II, III, IV, VI: Visual acuity grossly intact. Visual fields are normal.    Pupils are equal, round, and reactive to light and accommodation.    Extra-ocular movements are full and fluid. Fundoscopic exam was benign, no ptosis or nystagmus.    V-XII: Hearing is grossly intact. Facial features are symmetric, with normal sensation and strength. The palate rises symmetrically and the tongue protrudes midline. Sternocleidomastoids 5/5.    Motor Examination: Normal tone, bulk, very trace weakness noted in the left upper extremity  Coordination: No resting or intention tremor    Gait and Station: Steady while walking. No muscle wasting or fasiculations noted.    Reflexes: DTRs 2+ throughout. ASSESSMENT AND PLAN    ICD-10-CM ICD-9-CM    1. Attention deficit disorder (ADD) without hyperactivity F98.8 314.00 dextroamphetamine-amphetamine (ADDERALL) 10 mg tablet   2.  MS (multiple sclerosis) ([facility]) G35 340 oxyCODONE IR (ROXICODONE) 10 mg tab immediate release tablet   3. Chronic pain syndrome G89.4 338.4 oxyCODONE IR (ROXICODONE) 10 mg tab immediate release tablet      amitriptyline (ELAVIL) 100 mg tablet      gabapentin (NEURONTIN) 300 mg capsule   4. Anxiety F41.9 300.00 DULoxetine (CYMBALTA) 60 mg capsule      Relapsing remitting multiple sclerosis after the completion of Lemtrada. She does continue to comply with her rems program.  Attention deficit disorder and fatigue are well managed with the Adderall. Anxiety is well managed with the Cymbalta. Her chronic pain issues are manageable with gabapentin, amitriptyline, and as needed oxycodone. This is a chronic problem that is stable. Per review of available records and patients , there are not sign of overuse, misuse, diversion, or concerning side effects. Today we reviewed: the risk of overdose, addiction, and dependency proper storage and disposal of medications the goals of treatment (improve functionality, quality of life, and pain) alternative treatment options including non-narcotic modalities the risks and benefits of continuing with a narcotic based pain regimen  The following changes were made to the patients current treatment plan: nothing, medications refilled. Follow-up in 3 months or sooner if needed. Myriam Castellanos

## 2019-02-07 NOTE — PROGRESS NOTES
MS- joint pain has been crazy the last several days because of the switching of the weather     Have had some vision issues- she is going to make an eye appt since she now has Medicaid     Hard for her to discribe the changes in her vision it seems like when she gets used to it then it changes

## 2019-02-07 NOTE — PATIENT INSTRUCTIONS
A Healthy Lifestyle: Care Instructions  Your Care Instructions    A healthy lifestyle can help you feel good, stay at a healthy weight, and have plenty of energy for both work and play. A healthy lifestyle is something you can share with your whole family. A healthy lifestyle also can lower your risk for serious health problems, such as high blood pressure, heart disease, and diabetes. You can follow a few steps listed below to improve your health and the health of your family. Follow-up care is a key part of your treatment and safety. Be sure to make and go to all appointments, and call your doctor if you are having problems. It's also a good idea to know your test results and keep a list of the medicines you take. How can you care for yourself at home? · Do not eat too much sugar, fat, or fast foods. You can still have dessert and treats now and then. The goal is moderation. · Start small to improve your eating habits. Pay attention to portion sizes, drink less juice and soda pop, and eat more fruits and vegetables. ? Eat a healthy amount of food. A 3-ounce serving of meat, for example, is about the size of a deck of cards. Fill the rest of your plate with vegetables and whole grains. ? Limit the amount of soda and sports drinks you have every day. Drink more water when you are thirsty. ? Eat at least 5 servings of fruits and vegetables every day. It may seem like a lot, but it is not hard to reach this goal. A serving or helping is 1 piece of fruit, 1 cup of vegetables, or 2 cups of leafy, raw vegetables. Have an apple or some carrot sticks as an afternoon snack instead of a candy bar. Try to have fruits and/or vegetables at every meal.  · Make exercise part of your daily routine. You may want to start with simple activities, such as walking, bicycling, or slow swimming. Try to be active 30 to 60 minutes every day. You do not need to do all 30 to 60 minutes all at once.  For example, you can exercise 3 times a day for 10 or 20 minutes. Moderate exercise is safe for most people, but it is always a good idea to talk to your doctor before starting an exercise program.  · Keep moving. [place] [employer] the lawn, work in the garden, or [employer]. Take the stairs instead of the elevator at work. · If you smoke, quit. People who smoke have an increased risk for heart attack, stroke, cancer, and other lung illnesses. Quitting is hard, but there are ways to boost your chance of quitting tobacco for good. ? Use nicotine gum, patches, or lozenges. ? Ask your doctor about stop-smoking programs and medicines. ? Keep trying. In addition to reducing your risk of diseases in the future, you will notice some benefits soon after you stop using tobacco. If you have shortness of breath or asthma symptoms, they will likely get better within a few weeks after you quit. · Limit how much alcohol you drink. Moderate amounts of alcohol (up to 2 drinks a day for men, 1 drink a day for women) are okay. But drinking too much can lead to liver problems, high blood pressure, and other health problems. Family health  If you have a family, there are many things you can do together to improve your health. · Eat meals together as a family as often as possible. · Eat healthy foods. This includes fruits, vegetables, lean meats and dairy, and whole grains. · Include your family in your fitness plan. Most people think of activities such as jogging or tennis as the way to fitness, but there are many ways you and your family can be more active. Anything that makes you breathe hard and gets your heart pumping is exercise. Here are some tips:  ? Walk to do errands or to take your child to school or the bus.  ? Go for a family bike ride after dinner instead of watching TV. Where can you learn more? Go to http://meghana-raul.info/. Enter Y380 in the search box to learn more about \"A Healthy Lifestyle: Care Instructions. \"  Current as of: September 11, 2018  Content Version: 11.9  © 8506-9963 [employer]. Care instructions adapted under license by [employer] (which disclaims liability or warranty for this information). If you have questions about a medical condition or this instruction, always ask your healthcare professional. [address] any warranty or liability for your use of this information.

## 2019-03-06 DIAGNOSIS — G89.4 CHRONIC PAIN SYNDROME: ICD-10-CM

## 2019-03-06 DIAGNOSIS — F98.8 ATTENTION DEFICIT DISORDER (ADD) WITHOUT HYPERACTIVITY: ICD-10-CM

## 2019-03-06 DIAGNOSIS — G35 MS (MULTIPLE SCLEROSIS) (HCC): ICD-10-CM

## 2019-03-06 RX ORDER — OXYCODONE HYDROCHLORIDE 10 MG/1
10 TABLET ORAL
Qty: 60 TAB | Refills: 0 | Status: SHIPPED | OUTPATIENT
Start: 2019-03-06 | End: 2019-04-02 | Stop reason: SDUPTHER

## 2019-03-06 RX ORDER — DEXTROAMPHETAMINE SACCHARATE, AMPHETAMINE ASPARTATE, DEXTROAMPHETAMINE SULFATE AND AMPHETAMINE SULFATE 2.5; 2.5; 2.5; 2.5 MG/1; MG/1; MG/1; MG/1
10 TABLET ORAL 2 TIMES DAILY
Qty: 60 TAB | Refills: 0 | Status: SHIPPED | OUTPATIENT
Start: 2019-03-06 | End: 2019-04-02 | Stop reason: SDUPTHER

## 2019-03-22 ENCOUNTER — TELEPHONE (OUTPATIENT)
Dept: NEUROLOGY | Age: 54
End: 2019-03-22

## 2019-03-26 ENCOUNTER — TELEPHONE (OUTPATIENT)
Dept: NEUROLOGY | Age: 54
End: 2019-03-26

## 2019-03-26 NOTE — TELEPHONE ENCOUNTER
----- Message from Tracey Simpson sent at 3/25/2019  2:20 PM EDT -----  Regarding: BELLA Wong/Telephone  Contact: [phone]  Pt called about missed call from Vini and would like a call back.  Can leave detailed message

## 2019-04-02 DIAGNOSIS — G35 MS (MULTIPLE SCLEROSIS) (HCC): ICD-10-CM

## 2019-04-02 DIAGNOSIS — F98.8 ATTENTION DEFICIT DISORDER (ADD) WITHOUT HYPERACTIVITY: ICD-10-CM

## 2019-04-02 DIAGNOSIS — G89.4 CHRONIC PAIN SYNDROME: ICD-10-CM

## 2019-04-02 NOTE — TELEPHONE ENCOUNTER
----- Message from Macrina Brandon sent at 4/2/2019 10:34 AM EDT -----  Regarding: BELLA Wong/Refill  Pt requested refill on Rxs for (\"Adderall\" and \"Roxicodone\") for . Best contact number [phone], please leave message if no answer.

## 2019-04-04 RX ORDER — DEXTROAMPHETAMINE SACCHARATE, AMPHETAMINE ASPARTATE, DEXTROAMPHETAMINE SULFATE AND AMPHETAMINE SULFATE 2.5; 2.5; 2.5; 2.5 MG/1; MG/1; MG/1; MG/1
10 TABLET ORAL 2 TIMES DAILY
Qty: 60 TAB | Refills: 0 | Status: SHIPPED | OUTPATIENT
Start: 2019-04-04 | End: 2019-05-02 | Stop reason: SDUPTHER

## 2019-04-04 RX ORDER — OXYCODONE HYDROCHLORIDE 10 MG/1
10 TABLET ORAL
Qty: 60 TAB | Refills: 0 | Status: SHIPPED | OUTPATIENT
Start: 2019-04-04 | End: 2019-05-02 | Stop reason: SDUPTHER

## 2019-05-01 DIAGNOSIS — G35 MS (MULTIPLE SCLEROSIS) (HCC): ICD-10-CM

## 2019-05-01 DIAGNOSIS — F98.8 ATTENTION DEFICIT DISORDER (ADD) WITHOUT HYPERACTIVITY: ICD-10-CM

## 2019-05-01 DIAGNOSIS — G89.4 CHRONIC PAIN SYNDROME: ICD-10-CM

## 2019-05-01 NOTE — TELEPHONE ENCOUNTER
Pt calling to request a refill on Oxycodone and Adderall, Please call when ready for   Best # [phone]

## 2019-05-02 RX ORDER — OXYCODONE HYDROCHLORIDE 10 MG/1
10 TABLET ORAL
Qty: 60 TAB | Refills: 0 | Status: SHIPPED | OUTPATIENT
Start: 2019-05-02 | End: 2019-06-04 | Stop reason: SDUPTHER

## 2019-05-02 RX ORDER — DEXTROAMPHETAMINE SACCHARATE, AMPHETAMINE ASPARTATE, DEXTROAMPHETAMINE SULFATE AND AMPHETAMINE SULFATE 2.5; 2.5; 2.5; 2.5 MG/1; MG/1; MG/1; MG/1
10 TABLET ORAL 2 TIMES DAILY
Qty: 60 TAB | Refills: 0 | Status: SHIPPED | OUTPATIENT
Start: 2019-05-02 | End: 2019-06-04 | Stop reason: SDUPTHER

## 2019-05-15 ENCOUNTER — TELEPHONE (OUTPATIENT)
Dept: NEUROLOGY | Age: 54
End: 2019-05-15

## 2019-06-01 DIAGNOSIS — F41.9 ANXIETY: ICD-10-CM

## 2019-06-03 RX ORDER — DULOXETIN HYDROCHLORIDE 60 MG/1
CAPSULE, DELAYED RELEASE ORAL
Qty: 30 CAP | Refills: 1 | Status: SHIPPED | OUTPATIENT
Start: 2019-06-03 | End: 2019-08-13 | Stop reason: SDUPTHER

## 2019-06-03 NOTE — TELEPHONE ENCOUNTER
----- Message from Marisol Yu sent at 6/3/2019 10:12 AM EDT -----  Regarding: BELLA Wong/ refjuan  The pt called because she needs a refill on her \"oxycodone\" and \"adderall\" medications. Please call when she can pick those up from the office.     Best contact number is [phone]

## 2019-06-04 DIAGNOSIS — G89.4 CHRONIC PAIN SYNDROME: ICD-10-CM

## 2019-06-04 DIAGNOSIS — F98.8 ATTENTION DEFICIT DISORDER (ADD) WITHOUT HYPERACTIVITY: ICD-10-CM

## 2019-06-04 DIAGNOSIS — G35 MS (MULTIPLE SCLEROSIS) (HCC): ICD-10-CM

## 2019-06-04 RX ORDER — OXYCODONE HYDROCHLORIDE 10 MG/1
10 TABLET ORAL
Qty: 60 TAB | Refills: 0 | Status: SHIPPED | OUTPATIENT
Start: 2019-06-04 | End: 2019-07-10 | Stop reason: SDUPTHER

## 2019-06-04 RX ORDER — DEXTROAMPHETAMINE SACCHARATE, AMPHETAMINE ASPARTATE, DEXTROAMPHETAMINE SULFATE AND AMPHETAMINE SULFATE 2.5; 2.5; 2.5; 2.5 MG/1; MG/1; MG/1; MG/1
10 TABLET ORAL 2 TIMES DAILY
Qty: 60 TAB | Refills: 0 | Status: SHIPPED | OUTPATIENT
Start: 2019-06-04 | End: 2019-07-10 | Stop reason: SDUPTHER

## 2019-06-04 NOTE — TELEPHONE ENCOUNTER
Patient called needs a refill on Oxycodone 10mg . Patient needs to  today.  Patient's son in-law ( timo Bautista)  passed away a week ago and will be busy helping with the grandkids

## 2019-07-08 DIAGNOSIS — F98.8 ATTENTION DEFICIT DISORDER (ADD) WITHOUT HYPERACTIVITY: ICD-10-CM

## 2019-07-08 DIAGNOSIS — G35 MS (MULTIPLE SCLEROSIS) (HCC): ICD-10-CM

## 2019-07-08 DIAGNOSIS — G89.4 CHRONIC PAIN SYNDROME: ICD-10-CM

## 2019-07-08 NOTE — TELEPHONE ENCOUNTER
Pt need refills on Adderall and her pain  Medication and also would like to talk with you  [phone]

## 2019-07-09 DIAGNOSIS — G89.4 CHRONIC PAIN SYNDROME: ICD-10-CM

## 2019-07-09 NOTE — TELEPHONE ENCOUNTER
Pt is calling in ref to status of refill for adderall and pain medication oxycodone  Pt is completely out  Please call when ready for   Best [phone]  Pt has upcoming appt for 8/13/19 @ 11am

## 2019-07-10 ENCOUNTER — TELEPHONE (OUTPATIENT)
Dept: NEUROLOGY | Age: 54
End: 2019-07-10

## 2019-07-10 RX ORDER — OXYCODONE HYDROCHLORIDE 10 MG/1
10 TABLET ORAL
Qty: 60 TAB | Refills: 0 | Status: SHIPPED | OUTPATIENT
Start: 2019-07-10 | End: 2019-08-13 | Stop reason: SDUPTHER

## 2019-07-10 RX ORDER — DEXTROAMPHETAMINE SACCHARATE, AMPHETAMINE ASPARTATE, DEXTROAMPHETAMINE SULFATE AND AMPHETAMINE SULFATE 2.5; 2.5; 2.5; 2.5 MG/1; MG/1; MG/1; MG/1
10 TABLET ORAL 2 TIMES DAILY
Qty: 60 TAB | Refills: 0 | Status: SHIPPED | OUTPATIENT
Start: 2019-07-10 | End: 2019-08-13 | Stop reason: SDUPTHER

## 2019-07-10 RX ORDER — AMITRIPTYLINE HYDROCHLORIDE 100 MG/1
TABLET, FILM COATED ORAL
Qty: 30 TAB | Refills: 1 | Status: SHIPPED | OUTPATIENT
Start: 2019-07-10 | End: 2019-08-13 | Stop reason: SDUPTHER

## 2019-07-18 DIAGNOSIS — G89.4 CHRONIC PAIN SYNDROME: ICD-10-CM

## 2019-07-19 RX ORDER — GABAPENTIN 300 MG/1
600 CAPSULE ORAL 3 TIMES DAILY
Qty: 360 CAP | Refills: 2 | Status: SHIPPED | OUTPATIENT
Start: 2019-07-19 | End: 2019-08-13 | Stop reason: SDUPTHER

## 2019-08-13 ENCOUNTER — OFFICE VISIT (OUTPATIENT)
Dept: NEUROLOGY | Age: 54
End: 2019-08-13

## 2019-08-13 VITALS
HEART RATE: 67 BPM | DIASTOLIC BLOOD PRESSURE: 76 MMHG | BODY MASS INDEX: 26.36 KG/M2 | WEIGHT: 164 LBS | OXYGEN SATURATION: 95 % | HEIGHT: 66 IN | RESPIRATION RATE: 20 BRPM | SYSTOLIC BLOOD PRESSURE: 134 MMHG

## 2019-08-13 DIAGNOSIS — G89.4 CHRONIC PAIN SYNDROME: ICD-10-CM

## 2019-08-13 DIAGNOSIS — R41.3 MEMORY LOSS: Primary | ICD-10-CM

## 2019-08-13 DIAGNOSIS — G35 MS (MULTIPLE SCLEROSIS) (HCC): ICD-10-CM

## 2019-08-13 DIAGNOSIS — F41.9 ANXIETY: ICD-10-CM

## 2019-08-13 DIAGNOSIS — F98.8 ATTENTION DEFICIT DISORDER (ADD) WITHOUT HYPERACTIVITY: ICD-10-CM

## 2019-08-13 RX ORDER — GABAPENTIN 300 MG/1
600 CAPSULE ORAL 3 TIMES DAILY
Qty: 360 CAP | Refills: 3 | Status: SHIPPED | OUTPATIENT
Start: 2019-08-13 | End: 2020-03-04 | Stop reason: SDUPTHER

## 2019-08-13 RX ORDER — AMITRIPTYLINE HYDROCHLORIDE 100 MG/1
TABLET, FILM COATED ORAL
Qty: 30 TAB | Refills: 3 | Status: SHIPPED | OUTPATIENT
Start: 2019-08-13 | End: 2020-02-03

## 2019-08-13 RX ORDER — DEXTROAMPHETAMINE SACCHARATE, AMPHETAMINE ASPARTATE, DEXTROAMPHETAMINE SULFATE AND AMPHETAMINE SULFATE 2.5; 2.5; 2.5; 2.5 MG/1; MG/1; MG/1; MG/1
10 TABLET ORAL 2 TIMES DAILY
Qty: 60 TAB | Refills: 0 | Status: SHIPPED | OUTPATIENT
Start: 2019-08-13 | End: 2019-09-12 | Stop reason: SDUPTHER

## 2019-08-13 RX ORDER — OXYCODONE HYDROCHLORIDE 10 MG/1
10 TABLET ORAL
Qty: 60 TAB | Refills: 0 | Status: SHIPPED | OUTPATIENT
Start: 2019-08-13 | End: 2019-09-12 | Stop reason: SDUPTHER

## 2019-08-13 RX ORDER — DULOXETIN HYDROCHLORIDE 60 MG/1
CAPSULE, DELAYED RELEASE ORAL
Qty: 30 CAP | Refills: 3 | Status: SHIPPED | OUTPATIENT
Start: 2019-08-13 | End: 2020-02-03

## 2019-08-13 NOTE — PROGRESS NOTES
She stated she has been sleeping better   MS- other than some joint pain and having some headaches that she attributes to the weather and her over doing it on some days

## 2019-08-13 NOTE — PROGRESS NOTES
[address],[address]  [address]   [place] [address] [place] [address] [address]   [phone] Office   [phone] Fax           Date:  19     Name:  Hunter Jenkins  :  1965  MRN:  [id number]     PCP:  None    Chief Complaint   Patient presents with    Multiple Sclerosis     HISTORY OF PRESENT ILLNESS: Follow up for RRMS. States that she has been having a lot of hand pain which she thinks is related to arthritis. She started the Cymbalta and states that she is feeling a little calmer and she is not feeling as angry all the time. Recently, she seems to have a little more issues with anxiety. Her daughter indicates that she thinks that it might have something to do with some of the memory problems. Her daughter reports that she is asking the same questions several times and does not recall that she already asked. Ms. Yair Kraus indicates that she has noticed word finding issues. Daughter also reports that in the last 2-3 months she seems to be sleeping a lot. Her joint pains are still an issue and the weather seems to cause this to be worse. She is still using the Oxycodone but will wait until her hands really hurt. She does continue to have some fatigue and chronic attentional deficit issues. These are managed with the Adderall when she does take it though there are days when she takes it and has been able to fall asleep without it. She may have more clumsiness and her daughter thinks that she might be a little weaker as well. Her last Lemtrada dose was in 2017. She does not qualify for disability because she did not apply for disability within seven years of her last date of work. Otherwise, she has not had any new neurological symptoms or exacerbation. She denies having any significant issues with her bowel or bladder. No changes in her hearing or vision.        Recap from her last office visit:  Relapsing remitting multiple sclerosis after the completion of Lemtrada. She does continue to comply with her rems program.  Attention deficit disorder and fatigue are well managed with the Adderall. Anxiety is well managed with the Cymbalta. Her chronic pain issues are manageable with gabapentin, amitriptyline, and as needed oxycodone. This is a chronic problem that is stable. Per review of available records and patients , there are not sign of overuse, misuse, diversion, or concerning side effects. Today we reviewed: the risk of overdose, addiction, and dependency proper storage and disposal of medications the goals of treatment (improve functionality, quality of life, and pain) alternative treatment options including non-narcotic modalities the risks and benefits of continuing with a narcotic based pain regimen  The following changes were made to the patients current treatment plan: nothing, medications refilled. Except as noted above, denies  fever, chills, cough. No CP or SOB. No Weight loss. Appetite good. Sleeping well. No sweats. No edema. No bruising or bleeding. No nausea or vomit. No diarrhea. No frequency, urgency, No depressive sxs. No anxiety. Denies sore throat, nasal congestion, nasal discharge, epistaxis, tinnitus, hearing loss, back pain, muscle pain       Current Outpatient Medications   Medication Sig    gabapentin (NEURONTIN) 300 mg capsule Take 2 Caps by mouth three (3) times daily.  amitriptyline (ELAVIL) 100 mg tablet TAKE ONE TABLET BY MOUTH ONCE NIGHTLY    dextroamphetamine-amphetamine (ADDERALL) 10 mg tablet Take 1 Tab by mouth two (2) times a day. Max Daily Amount: 20 mg.    DULoxetine (CYMBALTA) 60 mg capsule TAKE ONE CAPSULE BY MOUTH DAILY    aspirin delayed-release 81 mg tablet Take 162 mg by mouth daily as needed. No current facility-administered medications for this visit.       No Known Allergies  Past Medical History:   Diagnosis Date    Delivery normal x2    Fibromyalgia     Headache(784.0)     Migraine headache     Multiple sclerosis (HCC)     Sarcoidosis     Sun-damaged skin     Tanning bed exposure      Past Surgical History:   Procedure Laterality Date    ABDOMEN SURGERY PROC UNLISTED      galbladder removal    HX CHOLECYSTECTOMY  1990    HX GYN      tubal ligation    HX GYN      tubial     HX OTHER SURGICAL      tubal ligation    HX TUBAL LIGATION  1994     Social History     Socioeconomic History    Marital status: SINGLE     Spouse name: Not on file    Number of children: Not on file    Years of education: Not on file    Highest education level: Not on file   Occupational History    Not on file   Social Needs    Financial resource strain: Not on file    Food insecurity:     Worry: Not on file     Inability: Not on file    Transportation needs:     Medical: Not on file     Non-medical: Not on file   Tobacco Use    Smoking status: Current Every Day Smoker     Packs/day: 1.00     Years: 30.00     Pack years: 30.00     Types: Cigarettes    Smokeless tobacco: Never Used   Substance and Sexual Activity    Alcohol use: No    Drug use: Yes     Types: Marijuana    Sexual activity: Yes     Partners: Male     Birth control/protection: Surgical   Lifestyle    Physical activity:     Days per week: Not on file     Minutes per session: Not on file    Stress: Not on file   Relationships    Social connections:     Talks on phone: Not on file     Gets together: Not on file     Attends Worship service: Not on file     Active member of club or organization: Not on file     Attends meetings of clubs or organizations: Not on file     Relationship status: Not on file    Intimate partner violence:     Fear of current or ex partner: Not on file     Emotionally abused: Not on file     Physically abused: Not on file     Forced sexual activity: Not on file   Other Topics Concern    Not on file   Social History Narrative    ** Merged History Encounter **          Family History   Problem Relation Age of Onset    Thyroid Disease Sister     Thyroid Disease Sister     Other Mother         Alverto Vora rusty's disease 62    Heart Disease Father     Cancer Father         lymphoma       PHYSICAL EXAMINATION:    Visit Vitals  /76   Pulse 67   Resp 20   Ht 5' 6\" (1.676 m)   Wt 74.4 kg (164 lb)   SpO2 95%   BMI 26.47 kg/m²     General: Well defined, nourished, and groomed individual in no acute distress.    Neck: Supple, nontender, no bruits    Heart: Regular rate and rhythm    Lungs: Clear to auscultation bilaterally    Musculoskeletal: Extremities revealed no edema and had full range of motion of joints. There is some pain with palpation with at the greater trochanteric bursa bilaterally.    Psych: Good mood and bright affect    NEUROLOGICAL EXAMINATION:    Mental Status: Alert and oriented to person, place, and time with recent and remote memory intact.    Cranial Nerves:    II, III, IV, VI: Visual acuity grossly intact. Visual fields are normal.    Pupils are equal, round, and reactive to light and accommodation.    Extra-ocular movements are full and fluid. Fundoscopic exam was benign, no ptosis or nystagmus.    V-XII: Hearing is grossly intact. Facial features are symmetric, with normal sensation and strength. The palate rises symmetrically and the tongue protrudes midline. Sternocleidomastoids 5/5.    Motor Examination: Normal tone, bulk, very trace weakness noted in the left upper extremity  Coordination: No resting or intention tremor    Gait and Station: Steady while walking. No muscle wasting or fasiculations noted.    Reflexes: DTRs 2+ throughout. ASSESSMENT AND PLAN    ICD-10-CM ICD-9-CM    1. Memory loss R41.3 780.93 TSH AND FREE T4      VITAMIN B12 & FOLATE      METABOLIC PANEL, COMPREHENSIVE      REFERRAL TO NEUROPSYCHOLOGY   2.  Chronic pain syndrome G89.4 338.4 gabapentin (NEURONTIN) 300 mg capsule      amitriptyline (ELAVIL) 100 mg tablet oxyCODONE IR (ROXICODONE) 10 mg tab immediate release tablet   3. Attention deficit disorder (ADD) without hyperactivity F98.8 314.00 dextroamphetamine-amphetamine (ADDERALL) 10 mg tablet      REFERRAL TO NEUROPSYCHOLOGY   4. Anxiety F41.9 300.00 DULoxetine (CYMBALTA) 60 mg capsule   5. MS (multiple sclerosis) ([facility]) G35 340 oxyCODONE IR (ROXICODONE) 10 mg tab immediate release tablet      MRI BRAIN W WO CONT      METABOLIC PANEL, COMPREHENSIVE      Relapsing remitting multiple sclerosis after the completion of Lemtrada. She does continue to comply with her rems program.  No exacerbations. Attention deficit disorder and fatigue are managed with the Adderall though she does have good and bad days. Anxiety is managed with the Cymbalta though her depression may be a little worse due to loneliness and her son in law passing. This may be having some effect on her memory in addition to the MS. Will assess for metabolic causes for the memory loss and set her up for the MRI to reassess her MS. I have also recommended that she have neuropsychological evaluation. Her chronic pain issues are manageable with gabapentin, amitriptyline, and as needed oxycodone. This is a chronic problem that is stable. Per review of available records and patients , there are not sign of overuse, misuse, diversion, or concerning side effects. Today we reviewed: the risk of overdose, addiction, and dependency proper storage and disposal of medications the goals of treatment (improve functionality, quality of life, and pain) alternative treatment options including non-narcotic modalities the risks and benefits of continuing with a narcotic based pain regimen  The following changes were made to the patients current treatment plan: nothing, medications refilled. Follow-up in 3 months or sooner if needed. Myriam Fields

## 2019-09-10 DIAGNOSIS — F98.8 ATTENTION DEFICIT DISORDER (ADD) WITHOUT HYPERACTIVITY: ICD-10-CM

## 2019-09-10 DIAGNOSIS — G35 MS (MULTIPLE SCLEROSIS) (HCC): ICD-10-CM

## 2019-09-10 DIAGNOSIS — G89.4 CHRONIC PAIN SYNDROME: ICD-10-CM

## 2019-09-10 NOTE — TELEPHONE ENCOUNTER
Pt is calling to request a refill on Adderall   and Oxycodone Please call when ready for  [phone]

## 2019-09-12 RX ORDER — DEXTROAMPHETAMINE SACCHARATE, AMPHETAMINE ASPARTATE, DEXTROAMPHETAMINE SULFATE AND AMPHETAMINE SULFATE 2.5; 2.5; 2.5; 2.5 MG/1; MG/1; MG/1; MG/1
10 TABLET ORAL 2 TIMES DAILY
Qty: 60 TAB | Refills: 0 | Status: SHIPPED | OUTPATIENT
Start: 2019-09-12 | End: 2019-10-10 | Stop reason: SDUPTHER

## 2019-09-12 RX ORDER — OXYCODONE HYDROCHLORIDE 10 MG/1
10 TABLET ORAL
Qty: 60 TAB | Refills: 0 | Status: SHIPPED | OUTPATIENT
Start: 2019-09-12 | End: 2019-10-10 | Stop reason: SDUPTHER

## 2019-10-09 DIAGNOSIS — F98.8 ATTENTION DEFICIT DISORDER (ADD) WITHOUT HYPERACTIVITY: ICD-10-CM

## 2019-10-09 DIAGNOSIS — G89.4 CHRONIC PAIN SYNDROME: ICD-10-CM

## 2019-10-09 DIAGNOSIS — G35 MS (MULTIPLE SCLEROSIS) (HCC): ICD-10-CM

## 2019-10-09 NOTE — TELEPHONE ENCOUNTER
----- Message from Brittny Mead sent at 10/9/2019  3:17 PM EDT -----  Regarding: Ashley/Refill  Medication Refill    Caller (if not patient):      Relationship of caller (if not patient):      Best contact number(s):[phone]      Name of medication and dosage if known: oxycodone /adderall      Is patient out of this medication (yes/no):      Pharmacy name:[facility]    Pharmacy listed in chart? (yes/no):yes  Pharmacy phone number:      Details to clarify the request:Pt called requesting a refill for script  at the office when medication is ready      Brittny Mead

## 2019-10-10 RX ORDER — OXYCODONE HYDROCHLORIDE 10 MG/1
10 TABLET ORAL
Qty: 60 TAB | Refills: 0 | Status: SHIPPED | OUTPATIENT
Start: 2019-10-10 | End: 2019-11-12 | Stop reason: SDUPTHER

## 2019-10-10 RX ORDER — DEXTROAMPHETAMINE SACCHARATE, AMPHETAMINE ASPARTATE, DEXTROAMPHETAMINE SULFATE AND AMPHETAMINE SULFATE 2.5; 2.5; 2.5; 2.5 MG/1; MG/1; MG/1; MG/1
10 TABLET ORAL 2 TIMES DAILY
Qty: 60 TAB | Refills: 0 | Status: SHIPPED | OUTPATIENT
Start: 2019-10-10 | End: 2019-11-12 | Stop reason: SDUPTHER

## 2019-11-11 ENCOUNTER — HOSPITAL ENCOUNTER (OUTPATIENT)
Dept: MRI IMAGING | Age: 54
Discharge: HOME | End: 2019-11-11
Payer: MEDICAID

## 2019-11-11 VITALS — BODY MASS INDEX: 25.23 KG/M2 | WEIGHT: 157 LBS | HEIGHT: 66 IN

## 2019-11-11 DIAGNOSIS — G35 MS (MULTIPLE SCLEROSIS) (HCC): ICD-10-CM

## 2019-11-11 PROCEDURE — 74011250636 HC RX REV CODE- 250/636

## 2019-11-11 PROCEDURE — 70553 MRI BRAIN STEM W/O & W/DYE: CPT

## 2019-11-11 PROCEDURE — A9575 INJ GADOTERATE MEGLUMI 0.1ML: HCPCS

## 2019-11-11 RX ORDER — SODIUM CHLORIDE 0.9 % (FLUSH) 0.9 %
10 SYRINGE (ML) INJECTION
Status: COMPLETED | OUTPATIENT
Start: 2019-11-11 | End: 2019-11-11

## 2019-11-11 RX ORDER — GADOTERATE MEGLUMINE 376.9 MG/ML
14 INJECTION INTRAVENOUS
Status: COMPLETED | OUTPATIENT
Start: 2019-11-11 | End: 2019-11-11

## 2019-11-11 RX ADMIN — Medication 10 ML: at 21:02

## 2019-11-11 RX ADMIN — GADOTERATE MEGLUMINE 14 ML: 376.9 INJECTION INTRAVENOUS at 21:02

## 2019-11-12 ENCOUNTER — OFFICE VISIT (OUTPATIENT)
Dept: NEUROLOGY | Age: 54
End: 2019-11-12

## 2019-11-12 VITALS
OXYGEN SATURATION: 98 % | SYSTOLIC BLOOD PRESSURE: 118 MMHG | DIASTOLIC BLOOD PRESSURE: 80 MMHG | WEIGHT: 167 LBS | HEIGHT: 66 IN | HEART RATE: 90 BPM | BODY MASS INDEX: 26.84 KG/M2

## 2019-11-12 DIAGNOSIS — F98.8 ATTENTION DEFICIT DISORDER (ADD) WITHOUT HYPERACTIVITY: ICD-10-CM

## 2019-11-12 DIAGNOSIS — G35 MS (MULTIPLE SCLEROSIS) (HCC): ICD-10-CM

## 2019-11-12 DIAGNOSIS — R41.3 MEMORY LOSS: ICD-10-CM

## 2019-11-12 DIAGNOSIS — Z79.891 USE OF OPIATES FOR THERAPEUTIC PURPOSES: ICD-10-CM

## 2019-11-12 DIAGNOSIS — D50.8 IRON DEFICIENCY ANEMIA SECONDARY TO INADEQUATE DIETARY IRON INTAKE: ICD-10-CM

## 2019-11-12 DIAGNOSIS — G89.4 CHRONIC PAIN SYNDROME: ICD-10-CM

## 2019-11-12 DIAGNOSIS — F41.9 ANXIETY: ICD-10-CM

## 2019-11-12 DIAGNOSIS — G35 MS (MULTIPLE SCLEROSIS) (HCC): Primary | ICD-10-CM

## 2019-11-12 RX ORDER — DEXTROAMPHETAMINE SACCHARATE, AMPHETAMINE ASPARTATE, DEXTROAMPHETAMINE SULFATE AND AMPHETAMINE SULFATE 2.5; 2.5; 2.5; 2.5 MG/1; MG/1; MG/1; MG/1
10 TABLET ORAL 2 TIMES DAILY
Qty: 60 TAB | Refills: 0 | Status: SHIPPED | OUTPATIENT
Start: 2019-11-12 | End: 2019-12-09 | Stop reason: SDUPTHER

## 2019-11-12 RX ORDER — OXYCODONE HYDROCHLORIDE 10 MG/1
10 TABLET ORAL
Qty: 60 TAB | Refills: 0 | Status: SHIPPED | OUTPATIENT
Start: 2019-11-12 | End: 2019-12-09 | Stop reason: SDUPTHER

## 2019-11-12 NOTE — PROGRESS NOTES
[address] [facility],[address]Th [address]  [address]   [place] [address] [place] [address] [address]   [phone] Office   [phone] Fax           Date:  19     Name:  Vicki Olivares  :  1965  MRN:  [id number]     PCP:  None    Chief Complaint   Patient presents with    Multiple Sclerosis     HISTORY OF PRESENT ILLNESS: Follow up for RRMS. States that she has been having a lot of hand pain which she thinks is related to arthritis. This is not new. She is looking for a PCP to help with her arthritic complaints as well as for general medical treatment. She continues with the Cymbalat which has helped her to feel a little less angry. She has not found that this has helped with the myalgias. Anxiety is stable. Memory and concentration are still an issue though the Adderall seems to help with the fatigue and concentration. She may have more clumsiness and will sometimes yeimi to the left. Her last Lemtrada dose was in 2017. She is compliant with the REMS program. Based on Hgb from this, she is still anemic. She was taking two of the children's chewable vitamins which did seem to help but states that she is not doing this now. No new neurological symptoms or exacerbation. She denies having any significant issues with her bowel or bladder. No changes in her hearing or vision. Recap from her last office visit:  Relapsing remitting multiple sclerosis after the completion of Lemtrada. She does continue to comply with her rems program.  No exacerbations. Attention deficit disorder and fatigue are managed with the Adderall though she does have good and bad days. Anxiety is managed with the Cymbalta though her depression may be a little worse due to loneliness and her son in law passing. This may be having some effect on her memory in addition to the MS.  Will assess for metabolic causes for the memory loss and set her up for the MRI to reassess her MS. I have also recommended that she have neuropsychological evaluation. Her chronic pain issues are manageable with gabapentin, amitriptyline, and as needed oxycodone. This is a chronic problem that is stable. Per review of available records and patients , there are not sign of overuse, misuse, diversion, or concerning side effects. Today we reviewed: the risk of overdose, addiction, and dependency proper storage and disposal of medications the goals of treatment (improve functionality, quality of life, and pain) alternative treatment options including non-narcotic modalities the risks and benefits of continuing with a narcotic based pain regimen  The following changes were made to the patients current treatment plan: nothing, medications refilled. Except as noted above, denies  fever, chills, cough. No CP or SOB. No Weight loss. Appetite good. Sleeping well. No sweats. No edema. No bruising or bleeding. No nausea or vomit. No diarrhea. No frequency, urgency, No depressive sxs. No anxiety. Denies sore throat, nasal congestion, nasal discharge, epistaxis, tinnitus, hearing loss, back pain, muscle pain       Current Outpatient Medications   Medication Sig    dextroamphetamine-amphetamine (ADDERALL) 10 mg tablet Take 1 Tab by mouth two (2) times a day. Max Daily Amount: 20 mg.    gabapentin (NEURONTIN) 300 mg capsule Take 2 Caps by mouth three (3) times daily.  amitriptyline (ELAVIL) 100 mg tablet TAKE ONE TABLET BY MOUTH ONCE NIGHTLY    DULoxetine (CYMBALTA) 60 mg capsule TAKE ONE CAPSULE BY MOUTH DAILY    aspirin delayed-release 81 mg tablet Take 162 mg by mouth daily as needed. No current facility-administered medications for this visit.       No Known Allergies  Past Medical History:   Diagnosis Date    Delivery normal     x2    Fibromyalgia     Headache(784.0)     Migraine headache     Multiple sclerosis (HCC)     Sarcoidosis     Sun-damaged skin     Tanning bed exposure      Past Surgical History:   Procedure Laterality Date    ABDOMEN SURGERY PROC UNLISTED      galbladder removal    HX CHOLECYSTECTOMY  1990    HX GYN      tubal ligation    HX GYN      tubial     HX OTHER SURGICAL      tubal ligation    HX TUBAL LIGATION  1994     Social History     Socioeconomic History    Marital status: SINGLE     Spouse name: Not on file    Number of children: Not on file    Years of education: Not on file    Highest education level: Not on file   Occupational History    Not on file   Social Needs    Financial resource strain: Not on file    Food insecurity:     Worry: Not on file     Inability: Not on file    Transportation needs:     Medical: Not on file     Non-medical: Not on file   Tobacco Use    Smoking status: Current Every Day Smoker     Packs/day: 1.00     Years: 30.00     Pack years: 30.00     Types: Cigarettes    Smokeless tobacco: Never Used   Substance and Sexual Activity    Alcohol use: No    Drug use: Yes     Types: Marijuana    Sexual activity: Yes     Partners: Male     Birth control/protection: Surgical   Lifestyle    Physical activity:     Days per week: Not on file     Minutes per session: Not on file    Stress: Not on file   Relationships    Social connections:     Talks on phone: Not on file     Gets together: Not on file     Attends Jewish service: Not on file     Active member of club or organization: Not on file     Attends meetings of clubs or organizations: Not on file     Relationship status: Not on file    Intimate partner violence:     Fear of current or ex partner: Not on file     Emotionally abused: Not on file     Physically abused: Not on file     Forced sexual activity: Not on file   Other Topics Concern    Not on file   Social History Narrative    ** Merged History Encounter **          Family History   Problem Relation Age of Onset    Thyroid Disease Sister     Thyroid Disease Sister    [address] [facility] [address] Other Mother Asa Heap rusty's disease 62    Heart Disease Father     Cancer Father         lymphoma       PHYSICAL EXAMINATION:    Visit Vitals  /80   Pulse 90   Ht 5' 6\" (1.676 m)   Wt 75.8 kg (167 lb)   SpO2 98%   BMI 26.95 kg/m²     General: Well defined, nourished, and groomed individual in no acute distress.    Neck: Supple, nontender, no bruits    Heart: Regular rate and rhythm    Lungs: Clear to auscultation bilaterally    Musculoskeletal: Extremities revealed no edema and had full range of motion of joints. There is some pain with palpation with at the greater trochanteric bursa bilaterally.    Psych: Good mood and bright affect    NEUROLOGICAL EXAMINATION:    Mental Status: Alert and oriented to person, place, and time with recent and remote memory intact.    Cranial Nerves:    II, III, IV, VI: Visual acuity grossly intact. Visual fields are normal.    Pupils are equal, round, and reactive to light and accommodation.    Extra-ocular movements are full and fluid. Fundoscopic exam was benign, no ptosis or nystagmus.    V-XII: Hearing is grossly intact. Facial features are symmetric, with normal sensation and strength. The palate rises symmetrically and the tongue protrudes midline. Sternocleidomastoids 5/5.    Motor Examination: Normal tone, bulk, very trace weakness noted in the left upper extremity  Coordination: No resting or intention tremor    Gait and Station: Steady while walking. No muscle wasting or fasiculations noted.    Reflexes: DTRs 2+ throughout. ASSESSMENT AND PLAN    ICD-10-CM ICD-9-CM    1. MS (multiple sclerosis) ([facility]) G35 340 oxyCODONE IR (ROXICODONE) 10 mg tab immediate release tablet      METABOLIC PANEL, COMPREHENSIVE      TSH AND FREE T4   2. Chronic pain syndrome G89.4 338.4 oxyCODONE IR (ROXICODONE) 10 mg tab immediate release tablet   3. Attention deficit disorder (ADD) without hyperactivity F98.8 314.00 dextroamphetamine-amphetamine (ADDERALL) 10 mg tablet   4. Anxiety F41.9 300.00    5. Memory loss Q17.8 753.33 METABOLIC PANEL, COMPREHENSIVE      VITAMIN B12 & FOLATE      TSH AND FREE T4   6. Use of opiates for therapeutic purposes Z79.891 V58.69 TOXASSURE SELECT 13 (MW)   7. Iron deficiency anemia secondary to inadequate dietary iron intake D50.8 280.1      Relapsing remitting multiple sclerosis after the completion of Lemtrada. She does continue to comply with her rems program.  No exacerbations. Discussed her iron deficiency anemia and she was instructed to go back to taking two of the children's chewable vitamins as she seems to tolerate this. If her Hgb remains low, she may need to see hematology for iron infusions since she does not tolerate the oral iron supplements. Attention deficit disorder and fatigue are managed with the Adderall though she does have good and bad days. Anxiety is managed with the Cymbalta. Memory issues are ongoing and she has neuropsychological testing in February 2020. Will send her for labs again today for metabolic causes for the memory loss as this was not done the last time she was seen. Her chronic pain issues are manageable with gabapentin, amitriptyline, and as needed oxycodone. This is a chronic problem that is stable. Per review of available records and patients , there are not sign of overuse, misuse, diversion, or concerning side effects. Today we reviewed: the risk of overdose, addiction, and dependency proper storage and disposal of medications the goals of treatment (improve functionality, quality of life, and pain) alternative treatment options including non-narcotic modalities the risks and benefits of continuing with a narcotic based pain regimen  The following changes were made to the patients current treatment plan: nothing, medications refilled. UDS obtained today. Follow-up in 3 months or sooner if needed. Myriam Zarate

## 2019-12-09 DIAGNOSIS — F98.8 ATTENTION DEFICIT DISORDER (ADD) WITHOUT HYPERACTIVITY: ICD-10-CM

## 2019-12-09 DIAGNOSIS — G35 MS (MULTIPLE SCLEROSIS) (HCC): ICD-10-CM

## 2019-12-09 DIAGNOSIS — G89.4 CHRONIC PAIN SYNDROME: ICD-10-CM

## 2019-12-10 RX ORDER — DEXTROAMPHETAMINE SACCHARATE, AMPHETAMINE ASPARTATE, DEXTROAMPHETAMINE SULFATE AND AMPHETAMINE SULFATE 2.5; 2.5; 2.5; 2.5 MG/1; MG/1; MG/1; MG/1
10 TABLET ORAL 2 TIMES DAILY
Qty: 60 TAB | Refills: 0 | Status: SHIPPED | OUTPATIENT
Start: 2019-12-10 | End: 2020-01-09 | Stop reason: SDUPTHER

## 2019-12-10 RX ORDER — OXYCODONE HYDROCHLORIDE 10 MG/1
10 TABLET ORAL
Qty: 60 TAB | Refills: 0 | Status: SHIPPED | OUTPATIENT
Start: 2019-12-10 | End: 2020-01-09 | Stop reason: SDUPTHER

## 2020-01-07 DIAGNOSIS — G35 MS (MULTIPLE SCLEROSIS) (HCC): ICD-10-CM

## 2020-01-07 DIAGNOSIS — G89.4 CHRONIC PAIN SYNDROME: ICD-10-CM

## 2020-01-07 DIAGNOSIS — F98.8 ATTENTION DEFICIT DISORDER (ADD) WITHOUT HYPERACTIVITY: ICD-10-CM

## 2020-01-07 NOTE — TELEPHONE ENCOUNTER
----- Message from Aviva Oneal sent at 1/7/2020  2:24 PM EST -----  Regarding: NP Marvin/Refill  Medication Refill    Caller (if not patient):      Relationship of caller (if not patient):      Best contact number(s):[phone]      Name of medication and dosage if known:\"Oxycodone and \"Adderrall\"      Is patient out of this medication (yes/no):yes      [facility] [address] listed in chart? (yes/no):yes  Pharmacy phone number:      Details to clarify the request: Pt requested a refill on her Rxs, and stated she is out of medications.       Aviva Oneal

## 2020-01-10 RX ORDER — DEXTROAMPHETAMINE SACCHARATE, AMPHETAMINE ASPARTATE, DEXTROAMPHETAMINE SULFATE AND AMPHETAMINE SULFATE 2.5; 2.5; 2.5; 2.5 MG/1; MG/1; MG/1; MG/1
10 TABLET ORAL 2 TIMES DAILY
Qty: 60 TAB | Refills: 0 | Status: SHIPPED | OUTPATIENT
Start: 2020-01-10 | End: 2020-02-05 | Stop reason: SDUPTHER

## 2020-01-10 RX ORDER — OXYCODONE HYDROCHLORIDE 10 MG/1
10 TABLET ORAL
Qty: 60 TAB | Refills: 0 | Status: SHIPPED | OUTPATIENT
Start: 2020-01-10 | End: 2020-02-12 | Stop reason: SDUPTHER

## 2020-01-10 NOTE — TELEPHONE ENCOUNTER
Patient called earlier in the week but Np has been out sick. She will be out of medication today. Can you fill these medications for her.

## 2020-02-03 DIAGNOSIS — F41.9 ANXIETY: ICD-10-CM

## 2020-02-03 DIAGNOSIS — G89.4 CHRONIC PAIN SYNDROME: ICD-10-CM

## 2020-02-03 RX ORDER — AMITRIPTYLINE HYDROCHLORIDE 100 MG/1
TABLET, FILM COATED ORAL
Qty: 30 TAB | Refills: 0 | Status: SHIPPED | OUTPATIENT
Start: 2020-02-03 | End: 2020-03-05

## 2020-02-03 RX ORDER — DULOXETIN HYDROCHLORIDE 60 MG/1
CAPSULE, DELAYED RELEASE ORAL
Qty: 30 CAP | Refills: 2 | Status: SHIPPED | OUTPATIENT
Start: 2020-02-03 | End: 2020-06-04

## 2020-02-12 ENCOUNTER — HOSPITAL ENCOUNTER (OUTPATIENT)
Dept: LAB | Age: 55
Discharge: HOME | End: 2020-02-12

## 2020-02-12 ENCOUNTER — OFFICE VISIT (OUTPATIENT)
Dept: NEUROLOGY | Age: 55
End: 2020-02-12

## 2020-02-12 DIAGNOSIS — G35 MS (MULTIPLE SCLEROSIS) (HCC): ICD-10-CM

## 2020-02-12 DIAGNOSIS — G89.4 CHRONIC PAIN SYNDROME: ICD-10-CM

## 2020-02-12 DIAGNOSIS — F98.8 ATTENTION DEFICIT DISORDER (ADD) WITHOUT HYPERACTIVITY: ICD-10-CM

## 2020-02-12 RX ORDER — OXYCODONE HYDROCHLORIDE 10 MG/1
10 TABLET ORAL
Qty: 60 TAB | Refills: 0 | Status: SHIPPED | OUTPATIENT
Start: 2020-02-12 | End: 2020-03-04 | Stop reason: SDUPTHER

## 2020-02-13 LAB
COMMENT, HOLDF: NORMAL
SAMPLES BEING HELD,HOLD: NORMAL

## 2020-02-18 LAB — DRUGS UR: NORMAL

## 2020-02-27 ENCOUNTER — OFFICE VISIT (OUTPATIENT)
Dept: NEUROLOGY | Age: 55
End: 2020-02-27

## 2020-02-27 DIAGNOSIS — Z81.8 FAMILY HISTORY OF DEMENTIA: ICD-10-CM

## 2020-02-27 DIAGNOSIS — Z79.891 USE OF OPIATES FOR THERAPEUTIC PURPOSES: ICD-10-CM

## 2020-02-27 DIAGNOSIS — R41.3 MEMORY LOSS: ICD-10-CM

## 2020-02-27 DIAGNOSIS — F41.9 ANXIETY: ICD-10-CM

## 2020-02-27 DIAGNOSIS — G35 COGNITIVE DEFICIT SECONDARY TO MS (HCC): Primary | ICD-10-CM

## 2020-02-27 DIAGNOSIS — R47.89 WORD FINDING DIFFICULTY: ICD-10-CM

## 2020-02-27 DIAGNOSIS — G89.4 CHRONIC PAIN SYNDROME: ICD-10-CM

## 2020-02-27 DIAGNOSIS — F98.8 ATTENTION DEFICIT DISORDER (ADD) WITHOUT HYPERACTIVITY: ICD-10-CM

## 2020-02-27 DIAGNOSIS — F09 COGNITIVE DEFICIT SECONDARY TO MS (HCC): Primary | ICD-10-CM
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
## 2020-02-28 ENCOUNTER — OFFICE VISIT (OUTPATIENT)
Dept: NEUROLOGY | Age: 55
End: 2020-02-28

## 2020-02-28 DIAGNOSIS — F41.9 MODERATE ANXIETY: ICD-10-CM

## 2020-02-28 DIAGNOSIS — F32.1 MODERATE MAJOR DEPRESSION (HCC): ICD-10-CM

## 2020-02-28 DIAGNOSIS — F90.0 ATTENTION DEFICIT HYPERACTIVITY DISORDER (ADHD), INATTENTIVE TYPE, MODERATE: Chronic | ICD-10-CM

## 2020-02-28 DIAGNOSIS — Z79.891 USE OF OPIATES FOR THERAPEUTIC PURPOSES: ICD-10-CM

## 2020-02-28 DIAGNOSIS — F09 COGNITIVE DEFICIT SECONDARY TO MS (HCC): Primary | ICD-10-CM

## 2020-02-28 DIAGNOSIS — G35 COGNITIVE DEFICIT SECONDARY TO MS (HCC): Primary | ICD-10-CM

## 2020-02-28 DIAGNOSIS — F45.42 PAIN DISORDER ASSOCIATED WITH PSYCHOLOGICAL AND PHYSICAL FACTORS: ICD-10-CM

## 2020-02-28 DIAGNOSIS — G35 MS (MULTIPLE SCLEROSIS) (HCC): ICD-10-CM

## 2020-02-28 DIAGNOSIS — G89.4 CHRONIC PAIN SYNDROME: ICD-10-CM

## 2020-02-28 NOTE — PROGRESS NOTES
[address] [facility],5Th [address]  [address]   [place] [address] [facility]    [phone] Office   [phone] Fax      Neuropsychological Evaluation Report      Referral:  Malinda TellesAnne Avitia is a 49 y.o. right handed  (x 21 years)  female who was accompanied by her sister to the initial clinical interview on 20 . Please refer to her medical records for details pertaining to her history. At the start of the appointment, I reviewed the patient's [facility] Epic Chart (including Media scanned in from previous providers) for the active Problem List, all pertinent Past Medical Hx, medications, recent radiologic and laboratory findings. In addition, I reviewed pt's documented Immunization Record and Encounter History. She completed high school without history of previously diagnosed LD and/or receipt of special education services. Their sister just passed away from  [facility]. They are concerned about this. The patient herself as RRMS. She is driving but twice has gotten turned around and didn't know where she was and pulled off the road. She puts things in the wrong place. Asks the same questions over and over. Longer to come up with words. Kids and family notice and are concerned. Having a consistent train of thought is hard because she can't come up with the right word. This has been going on a year and getting worse. She says to me she can't remember her name half the time. The family has been under tremendous stress. Her ex  just had a stroke. SHOSHANA  from stroke last year. Helping her daughter raise two kids, one of whom is autistic. Patient is on Cymbalta which helps keep her calm. She has ongoing issues with joint pain. She is still using the Oxycodone but will wait until her hands really hurt.   She does continue to have some fatigue and chronic attentional deficit issues. These are managed with the Adderall when she does take it though there are days when she takes it and has been able to fall asleep without it. She may have more clumsiness and her daughter thinks that she might be a little weaker as well. Memory issues getting worse. Reminders for medications, finances, day-to-day chores, etc.        EXAM:  MRI BRAIN W WO CONT  Clinical history: Relapsing remitting multiple sclerosis  INDICATION:    RRMS     COMPARISON:  9/21/2015.     CONTRAST: 20 ml Dotarem.     TECHNIQUE:    Multiplanar multisequence acquisition without and with contrast of the brain.     FINDINGS:  Periventricular and scattered foci of increased T2 signal intensity in the  corona radiata and centrum semiovale stable in size and distribution compared to  the 2015 examination. Increased T2 signal intensity foci adjacent to the  posterior horn left lateral ventricle and at the posterior horn of the right  lateral ventricle as well. No postcontrast enhancement to suggest active  demyelination. No diffusion restriction. No Chiari or syrinx. Pituitary  infundibulum unremarkable. IACs are unremarkable. No midline shift or mass  effect. IACs are symmetric in size. Cerebellopontine angles are unremarkable. Cavernous sinuses are symmetric. Expected arterial flow-voids are present. No  evidence of abnormal enhancement.     The paranasal sinuses, mastoid air cells, and middle ears are clear. The orbital  contents are within normal limits. No significant osseous or scalp lesions are  identified.     IMPRESSION  IMPRESSION:   Imaging findings suggest minimal interval progression of predominantly  supratentorial demyelinating disease burden without evidence of active  demyelination at this time.     No intracranial mass, hemorrhage or evidence of acute infarction.       Neuropsychological Mental Status Exam (NMSE):  Historian: Good  Praxis: No UE apraxia  R/L Orientation: Intact to self and to other  Dress: within normal limits   Weight: within normal limits   Appearance/Hygiene: within normal limits   Gait: within normal limits   Assistive Devices: None  Mood: within normal limits   Affect: within normal limits   Comprehension: within normal limits   Thought Process: within normal limits   Expressive Language: within normal limits   Receptive Language: within normal limits   Motor:  No cognitive or motor perseveration  ETOH: Denied  Tobacco: 1 ppd x many years. Counseling to quit given  Illicit: Used to smoke marijuana but not recently due to being on pain management. If she could, she would. SI/HI: Denied  Psychosis: Denied  Insight: Within normal limits  Judgment: Within normal limits  Other Psych:      Past Medical History:   Diagnosis Date    Delivery normal     x2    Fibromyalgia     Headache(784.0)     Migraine headache     Multiple sclerosis (HCC)     Sarcoidosis     Sun-damaged skin     Tanning bed exposure        Past Surgical History:   Procedure Laterality Date    ABDOMEN SURGERY PROC UNLISTED      galbladder removal    HX CHOLECYSTECTOMY  1990    HX GYN      tubal ligation    HX GYN      tubial     HX OTHER SURGICAL      tubal ligation    HX TUBAL LIGATION  1994       No Known Allergies    Family History   Problem Relation Age of Onset    Thyroid Disease Sister     Thyroid Disease Sister     Other Mother         Mendel Trang rusty's disease 62    Heart Disease Father     Cancer Father         lymphoma       Social History     Tobacco Use    Smoking status: Current Every Day Smoker     Packs/day: 1.00     Years: 30.00     Pack years: 30.00     Types: Cigarettes    Smokeless tobacco: Never Used   Substance Use Topics    Alcohol use: No    Drug use: Yes     Types: Marijuana       Current Outpatient Medications   Medication Sig Dispense Refill    oxyCODONE IR (ROXICODONE) 10 mg tab immediate release tablet Take 1 Tab by mouth every four (4) hours as needed for Pain for up to 30 days.  Max Daily Amount: 60 mg. 60 Tab 0    dextroamphetamine-amphetamine (ADDERALL) 10 mg tablet Take 1 Tab by mouth two (2) times a day. Max Daily Amount: 20 mg. 60 Tab 0    amitriptyline (ELAVIL) 100 mg tablet TAKE ONE TABLET BY MOUTH ONCE NIGHTLY 30 Tab 0    DULoxetine (CYMBALTA) 60 mg capsule TAKE ONE CAPSULE BY MOUTH DAILY 30 Cap 2    gabapentin (NEURONTIN) 300 mg capsule Take 2 Caps by mouth three (3) times daily. 360 Cap 3    aspirin delayed-release 81 mg tablet Take 162 mg by mouth daily as needed. Plan:  Obtain authorization for testing from insurance company. Report to follow once testing, scoring, and interpretation completed. ? Organic based neurocognitive issues versus mood disorder or combination of same. ? Problems organic, functional, or both? This note will not be viewable in [address] Neuropsychological Evaluation  Patient Testing 2/28/20 Report Completed 2/28/20  A Psychometrist Assisted w/ portions of this evaluation while under my direct supervision    Please refer to the patient's initial interview progress note (copied above) and her medical records for details pertaining to her history. Today's neuropsychological test scores follow: The following assessment procedures were performed:      Neuropsychologist Performed, Interpreted, & Reported: Neuropsychological Mental Status Exam, Revised Memory & Behavior Checklist, Mini Mental State Exam, Clock Drawing Test, Test Of Premorbid Functioning, Priscilla-Melzack Pain Questionnaire,  History Taking  & Clinical Interview With The Patient, Additional History Taking w/ The Patient's Sister, MIRA, CPT-III, Review Of Available Records.     Psychometrist Administered & Neuropsychologist Interpreted & Neuropsychologist Reported: Finger Tapping Test, Grooved Pegboard Test, Speech-Sounds Perception Test, TOD,  Wechsler Adult Intelligence Scale - IV, Verbal Fluency Tests, [facility] - Revised, Trailmaking Test Parts A & B, [place] Verbal Learning Test - 3, Darwin Complex Figure Test, Barone Depression Inventory - II, Abrone Anxiety Inventory,. Test Findings:  Note:  The patients raw data have been compared with currently available norms which include demographic corrections for age, gender, and/or education. Sometimes, the patients scores are compared to demographically similar individuals as close to the patients age, education level, etc., as possible. \"Average\" is viewed as being +/- 1 standard deviation (SD) from the stated mean for a particular test score. \"Low average\" is viewed as being between 1 and 2 SD below the mean, and above average is viewed as being 1 and 2 SD above the mean. Scores falling in the borderline range (between 1-1/2 and 2 SD below the mean) are viewed with particular attention as to whether they are normal or abnormal neurocognitive test scores. Other methods of inference in analyzing the test data are also utilized, including the pattern and range of scores in the profile, bilateral motor functions, and the presence, if any, of pathognomonic signs. Behaviorally, the patient was friendly and cooperative and appeared motivated to perform well during this examination. Within this context, the results of this evaluation are viewed as a valid reflection of the patients actual neurocognitive and emotional status. Her structured word list fluency, as assessed by the FAS Test, was within the average range with a T score of 48. Category fluency was within the above average range with a T score of 61,   Confrontation naming ability, as assessed by the [facility] - Revised, was within the above average range at 58/60 correct (T = 60).    This pattern of performance is not indicative of a patient who is at increased risk for day-to-day problems with verbal fluency and confrontation naming ability was also normal.        The patient was administered the [facility] Continuous Performance Test - III, a computer-administered test of sustained attention, and review of the subscales within this instrument revealed moderate concern for inattentiveness without impulsivity. Verbal auditory attention and discrimination, as assessed by the Speech-Sounds Perception Test (T = 53) was within the average range. Nonverbal auditory attention and discrimination, as assessed by the TOD, was also impaired. This pattern of performance is  indicative of a patient who is at increased risk for day-to-day problems with sustained visual attention/concentration. Verbal and nonverbal auditory attention and discrimination related abilities were normal.       The patient was administered the Wechsler Adult Intelligence Scale - IV. See Appendix I for full breakdown of IQ test scores (scanned into media section of this EMR). As can be seen, there was a clinically significant difference between her average range Working Memory Index score of 97 (42nd %ile) and her low average range Processing Speed Index score of 84 (14th  %ile). Her Verbal Comprehension Index score of 103 (58th %ile) was within the average range. Her Perceptual Reasoning Index score of 90 was average. This pattern of performance is not indicative of a patient who is at increased risk for day-to-day problems with working memory and/or processing speed, though the latter score is somewhat lower than expected based on her performance on a task estimating premorbid functioning levels. The patient was administered the [place] Verbal Learning Test  - 3 and generated a normal range and positive learning curve over five repeated auditory word list learning trials. An interference trial was within normal limits. Free and cued, short and long delayed recall were within or above the normal range. Recognition and forced choice recall were within normal limits.   This pattern of performance is not indicative of a patient who is at increased risk for day-to-day problems with auditory learning and/or memory. The patients performance on the copy portion of the Darwin Complex Figure Test was within normal limits. Recall for the complex design was within the low normal range after short and long delays. Recognition recall was normal.   This pattern of performance is not indicative of a patient who is at increased risk for day-to-day problems with visual organization and visual delayed memory. Simple timed visual motor sequencing (Trailmaking Test Part A) was within the average range with a T score of 53. Her performance on a similar, but more complex task of timed visual motor sequencing (Trailmaking Test Part B) was within the average range with a T score of 46. She made zero sequencing errors on this latter test.   Taken together, this pattern of performance is not indicative of a patient who is at increased risk for day-to-day problems with executive functioning. Motor speed for finger tapping was within the average range for her dominant hand (T = 47) and mildly impaired for her nondominant hand T = 38). Fine motor dexterity was mildly to moderately impaired for her dominant hand (T = 31) and below average for her nondominant hand (t = 40). This is a mixed pattern with respect to motor skills and neurologic correlation is indicated. See Priscilla below. The patient rated her current level of pain as \"2/5 - Discomforting\" on the Priscilla-Melzack Pain Questionnaire. She reported pain in her hands, head, shoulders, chest, barone, right upper leg, and knees. Sleep problems and appetite problems are also reported. Ricardo Lopez Her Barone Depression Inventory -II score of 23 was within the moderately depressed range. Her Barone Anxiety Inventory score of 19 reflected moderate anxiety. The patient was also administered the Personality Assessment Inventory and generated a valid profile for interpretation.   Within this context, there are numerous clinical scale elevations. Marked depression bis present. She is quite concerned about physical functioning and health matters and mood concerns likely exacerbate underlying pain related concerns. Her level of treatment motivation is somewhat low. Impressions & Recommendations: This patient generated a predominantly normal range Neuropsychological Evaluation with respect to neurocognitive functioning. In this regard, the only impairments noted were for sustained visual attention and nonverbal auditory attention. Motor skills are mixed. Otherwise, her performance across all other neurocognitive domains assessed, including mental status, verbal fluency, confrontation naming, auditory learning and memory, visual organization, visual memory, working memory, perceptual reasoning, verbal comprehension, processing speed, and executive functioning remain normal.  From an emotional standpoint, there is moderate to severe depression and moderate anxiety, and mood issues enhance her neurologic and general pain concerns. It is my opinion that the patient's reported (but not clinically corroborated) day-to-day memory problems are secondary to emotional distress and more chronic attentional problems. This is reassuring news. I suggest a review of her psychiatric medication management for marked depression and anxiety along with active engagement in psychotherapy. Consider an appropriate medication for attention as well if not medically contraindicated, and caution is advised in selecting same, given her high anxiety. Not concerned about competency, driving, day-to-day supervision, etc.  Stay active mentally, physically, and socially. Baseline now established. Follow up prn. Clinical correlation is, of course, indicated. I will discuss these findings with the patient when she follows up with me in the near future.   A follow up Neuropsychological Evaluation is indicated on a prn basis, especially if there are any cognitive and/or emotional changes. DIAGNOSES:   The Referral Diagnosis Of  Cognitive Difficulties - IS NOT SUPPORTED      Major Depression - Moderate To Severe       Anxiety Disorder - Moderate       ADHD, Inattentive, Chronic, Moderate      Pain Associated with Physical and Psychological Factors        The above information is based upon information currently available to me. If there is any additional information of which I am currently unaware, I would be more than happy to review it upon having it made available to me. Thank you for the opportunity to see this interesting individual.     Sincerely,       Luca Schaefer. Lacey Andrade, EdS    CC: [facility]     Time Documentation:      14109 x1 &  94589 x 1 Neuropsych testing/data gathering by Neuropsychologist (60 minutes)     [phone] x 1  96139 x 7 Test Administration/Data Gathering By Technician: (4 hours). 08166 x 1 (first 30 minutes), 30299 x 7 (each additional 30 minutes)    96132 x 1  96133 x 1 Testing Evaluation Services by Neuropsychologist (1 hour, 50 minutes) 96132 x 1 (first hour), 96133 x 1 (50 minutes)    Definitions:      46549/57334:  Neurobehavioral Status Exam, Clinical interview. Clinical assessment of thinking, reasoning and judgment, by neuropsychologist, both face to face time with patient and time interpreting those test results and reporting, first and subsequent hours)    69859/96791: Neuropsychological Test Administration by Technician/Psychometrist, first 30 minutes and each additional 30 minutes. The above includes: Record review. Review of history provided by patient. Review of collaborative information. Testing by Clinician. Review of raw data. Scoring. Report writing of individual tests administered by Clinician.   Integration of individual tests administered by psychometrist with NSE/testing by clinician, review of records/history/collaborative information, case Conceptualization, treatment planning, clinical decision making, report writing, coordination Of Care. Psychometry test codes as time spent by psychometrist administering and scoring neurocognitive/psychological tests under supervision of neuropsychologist.  Integral services including scoring of raw data, data interpretation, case conceptualization, report writing etcetera were initiated after the patient finished testing/raw data collected and was completed on the date the report was signed.

## 2020-03-04 ENCOUNTER — OFFICE VISIT (OUTPATIENT)
Dept: NEUROLOGY | Age: 55
End: 2020-03-04

## 2020-03-04 VITALS
DIASTOLIC BLOOD PRESSURE: 80 MMHG | WEIGHT: 157 LBS | BODY MASS INDEX: 25.23 KG/M2 | HEIGHT: 66 IN | OXYGEN SATURATION: 100 % | HEART RATE: 87 BPM | RESPIRATION RATE: 18 BRPM | SYSTOLIC BLOOD PRESSURE: 128 MMHG

## 2020-03-04 DIAGNOSIS — F32.1 MODERATE MAJOR DEPRESSION (HCC): ICD-10-CM

## 2020-03-04 DIAGNOSIS — F98.8 ATTENTION DEFICIT DISORDER (ADD) WITHOUT HYPERACTIVITY: ICD-10-CM

## 2020-03-04 DIAGNOSIS — M25.50 ARTHRALGIA, UNSPECIFIED JOINT: ICD-10-CM

## 2020-03-04 DIAGNOSIS — Z79.891 USE OF OPIATES FOR THERAPEUTIC PURPOSES: ICD-10-CM

## 2020-03-04 DIAGNOSIS — M65.4 DE QUERVAIN'S TENOSYNOVITIS, RIGHT: ICD-10-CM

## 2020-03-04 DIAGNOSIS — F41.9 MODERATE ANXIETY: ICD-10-CM

## 2020-03-04 DIAGNOSIS — F45.42 PAIN DISORDER ASSOCIATED WITH PSYCHOLOGICAL AND PHYSICAL FACTORS: ICD-10-CM

## 2020-03-04 DIAGNOSIS — G35 MS (MULTIPLE SCLEROSIS) (HCC): ICD-10-CM

## 2020-03-04 DIAGNOSIS — G89.4 CHRONIC PAIN SYNDROME: ICD-10-CM

## 2020-03-04 DIAGNOSIS — G35 MS (MULTIPLE SCLEROSIS) (HCC): Primary | ICD-10-CM

## 2020-03-04 RX ORDER — GABAPENTIN 300 MG/1
900 CAPSULE ORAL 3 TIMES DAILY
Qty: 810 CAP | Refills: 3 | Status: SHIPPED | OUTPATIENT
Start: 2020-03-04 | End: 2020-03-31 | Stop reason: SDUPTHER

## 2020-03-04 RX ORDER — DEXTROAMPHETAMINE SACCHARATE, AMPHETAMINE ASPARTATE, DEXTROAMPHETAMINE SULFATE AND AMPHETAMINE SULFATE 2.5; 2.5; 2.5; 2.5 MG/1; MG/1; MG/1; MG/1
10 TABLET ORAL 2 TIMES DAILY
Qty: 60 TAB | Refills: 0 | Status: SHIPPED | OUTPATIENT
Start: 2020-03-04 | End: 2020-03-31 | Stop reason: SDUPTHER

## 2020-03-04 RX ORDER — OXYCODONE HYDROCHLORIDE 10 MG/1
10 TABLET ORAL
Qty: 60 TAB | Refills: 0 | Status: SHIPPED | OUTPATIENT
Start: 2020-03-04 | End: 2020-03-31 | Stop reason: SDUPTHER

## 2020-03-04 NOTE — PATIENT INSTRUCTIONS
Indira Olivo Tenosynovitis: Care Instructions  Your Care Instructions  Indira Olivo (say \"Sophy\") tenosynovitis is a problem that makes the bottom of your thumb and the side of your wrist hurt. When you have de Quervain's, the ropey fiber (tendon) that helps move your thumb away from your fingers becomes swollen. You may have pain when you move your wrist or pick things up. You may hear a creaking sound when you move your wrist or thumb. Symptoms often get better in a few weeks with home care. Your doctor may want you to start some gentle stretching exercises once your symptoms are gone. Sometimes treatment with an injection or surgery is needed. Follow-up care is a key part of your treatment and safety. Be sure to make and go to all appointments, and call your doctor if you are having problems. It's also a good idea to know your test results and keep a list of the medicines you take. How can you care for yourself at home? · Until your symptoms are better, stop the activities that caused the pain. · Avoid moving the hand and wrist that hurt. · Follow your doctor's directions for wearing a splint to keep your thumb and wrist from moving. · Try ice or heat. ? Put ice or a cold pack on your thumb and wrist for 10 to 20 minutes at a time. Put a thin cloth between the ice and your skin. ? You can use heat for 20 to 30 minutes, 2 or 3 times a day. Try using a heating pad, hot shower, or hot pack. · Ask your doctor if you can take an over-the-counter pain medicine, such as acetaminophen (Tylenol), ibuprofen (Advil, Motrin), or naproxen (Aleve). Be safe with medicines. Read and follow all instructions on the label. When should you call for help? Watch closely for changes in your health, and be sure to contact your doctor if:    · You have new or worse pain.     · You have new or worse numbness or tingling in your hand or fingers.     · Your hand feels weaker.     · You do not get better as expected. Where can you learn more? Go to http://meghana-raul.info/. Enter L972 in the search box to learn more about \"De Quervain's Tenosynovitis: Care Instructions. \"  Current as of: June 26, 2019  Content Version: 12.2  © 4123-3005 [employer]. Care instructions adapted under license by [facility] (which disclaims liability or warranty for this information). If you have questions about a medical condition or this instruction, always ask your healthcare professional. [address] any warranty or liability for your use of this information. Electa Figures Disease: Exercises  Introduction  Here are some examples of exercises for you to try. The exercises may be suggested for a condition or for rehabilitation. Start each exercise slowly. Ease off the exercises if you start to have pain. You will be told when to start these exercises and which ones will work best for you. How to do the exercises  Thumb lifts    1. Place your hand on a flat surface, with your palm up. 2. Lift your thumb away from your palm to make a \"C\" shape. 3. Hold for about 6 seconds. 4. Repeat 8 to 12 times. Passive thumb MP flexion    1. Hold your hand in front of you, and turn your hand so your little finger faces down and your thumb faces up. (Your hand should be in the position used for shaking someone's hand.) You may also rest your hand on a flat surface. 2. Use the fingers on your other hand to bend your thumb down at the point where your thumb connects to your palm. 3. Hold for at least 15 to 30 seconds. 4. Repeat 2 to 4 times. Finkelstein stretch    1. Hold your arms out in front of you. (Your hand should be in the position used for shaking someone's hand.)  2. Bend your thumb toward your palm.   3. Use your other hand to gently stretch your thumb and wrist downward until you feel the stretch on the thumb side of your wrist.  4. Hold for at least 15 to 30 seconds. 5. Repeat 2 to 4 times. Resisted ulnar deviation    1. Sit leaning forward with your legs slightly spread and your elbow on your thigh. 2. Grasp one end of the band with your palm down, and step on the other end with the foot opposite the hand holding the band. 3. Slowly bend your wrist sideways and away from your knee. 4. Repeat 8 to 12 times. Follow-up care is a key part of your treatment and safety. Be sure to make and go to all appointments, and call your doctor if you are having problems. It's also a good idea to know your test results and keep a list of the medicines you take. Where can you learn more? Go to http://meghana-raul.info/. Enter L341 in the search box to learn more about \"De Quervain's Disease: Exercises. \"  Current as of: June 26, 2019  Content Version: 12.2  © 6078-3361 [employer]. Care instructions adapted under license by [employer] (which disclaims liability or warranty for this information). If you have questions about a medical condition or this instruction, always ask your healthcare professional. [address] any warranty or liability for your use of this information.

## 2020-03-04 NOTE — PROGRESS NOTES
Patient states she is doing okay for the most part just some stress. She states with the weather changing back and forth and her body feels heavy and everything hurts. She is also is wondering if you can up the dose on her oxycodone or give her something else. .  Chief Complaint   Patient presents with    Follow-up    Multiple Sclerosis     .   Visit Vitals  /80 (BP 1 Location: Left arm, BP Patient Position: Sitting)   Pulse 87   Resp 18   Ht 5' 6\" (1.676 m)   Wt 157 lb (71.2 kg)   SpO2 100%   BMI 25.34 kg/m²

## 2020-03-04 NOTE — PROGRESS NOTES
[address] [facility],[address]  [address]   [address] [facility] [address] [place] [address] Drive   [phone] Office   [phone] Fax           Date:  20     Name:  Gerson Welsh  :  1965  MRN:  [id number]     PCP:  None    Chief Complaint   Patient presents with    Follow-up    Multiple Sclerosis     HISTORY OF PRESENT ILLNESS: Follow up for RRMS. Today, she is complaining of ongoing bilateral hand pain. Specifically, this is located in the right thumb and radiates up her forearm on the radial side. She also has some ongoing thumb joint pain on the left hand. She knows that some of this is arthritic and has found that she is having more bilateral knee and hip stiffness. She is doing yoga for about 20 minutes a day and finds that this is helpful. She otherwise continues to have baseline chronic diffuse pain, MS hug. None of this is actually new. She continues with the Cymbalat which has helped her to feel a little less angry. Anxiety is stable. Memory and concentration are still an issue though the Adderall seems to help with the fatigue and concentration. She did see Dr. Bere Jordan for repeat testing which does demonstrate severe anxiety and moderate depression with ongoing moderately severe attention deficit disorder. Based on this testing, she does not have any significant memory problems more so than she has emotional distress it causes her day-to-day memory dysfunction. She continues to have some clumsiness particularly with the left lower extremity. She indicates that she feels that the yoga is actually helped with her balance. She teeters but she has not fallen. Her last Lemtrada dose was in 2017. She is compliant with the REMS program.  No new neurological symptoms or exacerbation. She denies having any significant issues with her bowel or bladder. No changes in her hearing or vision.        Recap from her last office visit:  Relapsing remitting multiple sclerosis after the completion of Lemtrada. She does continue to comply with her rems program.  No exacerbations. Discussed her iron deficiency anemia and she was instructed to go back to taking two of the children's chewable vitamins as she seems to tolerate this. If her Hgb remains low, she may need to see hematology for iron infusions since she does not tolerate the oral iron supplements. Attention deficit disorder and fatigue are managed with the Adderall though she does have good and bad days. Anxiety is managed with the Cymbalta. Memory issues are ongoing and she has neuropsychological testing in February 2020. Will send her for labs again today for metabolic causes for the memory loss as this was not done the last time she was seen. Her chronic pain issues are manageable with gabapentin, amitriptyline, and as needed oxycodone. This is a chronic problem that is stable. Per review of available records and patients , there are not sign of overuse, misuse, diversion, or concerning side effects. Today we reviewed: the risk of overdose, addiction, and dependency proper storage and disposal of medications the goals of treatment (improve functionality, quality of life, and pain) alternative treatment options including non-narcotic modalities the risks and benefits of continuing with a narcotic based pain regimen  The following changes were made to the patients current treatment plan: nothing, medications refilled. UDS obtained today. Except as noted above, denies  fever, chills, cough. No CP or SOB. No Weight loss. Appetite good. Sleeping well. No sweats. No edema. No bruising or bleeding. No nausea or vomit. No diarrhea. No frequency, urgency, No depressive sxs. No anxiety.   Denies sore throat, nasal congestion, nasal discharge, epistaxis, tinnitus, hearing loss, back pain, muscle pain       Current Outpatient Medications   Medication Sig    oxyCODONE IR (ROXICODONE) 10 mg tab immediate release tablet Take 1 Tab by mouth every four (4) hours as needed for Pain for up to 30 days. Max Daily Amount: 60 mg.    dextroamphetamine-amphetamine (ADDERALL) 10 mg tablet Take 1 Tab by mouth two (2) times a day. Max Daily Amount: 20 mg.    amitriptyline (ELAVIL) 100 mg tablet TAKE ONE TABLET BY MOUTH ONCE NIGHTLY    DULoxetine (CYMBALTA) 60 mg capsule TAKE ONE CAPSULE BY MOUTH DAILY    gabapentin (NEURONTIN) 300 mg capsule Take 2 Caps by mouth three (3) times daily.  aspirin delayed-release 81 mg tablet Take 162 mg by mouth daily as needed. No current facility-administered medications for this visit.       No Known Allergies  Past Medical History:   Diagnosis Date    Delivery normal     x2    Fibromyalgia     Headache(784.0)     Migraine headache     Multiple sclerosis (HCC)     Sarcoidosis     Sun-damaged skin     Tanning bed exposure      Past Surgical History:   Procedure Laterality Date    ABDOMEN SURGERY PROC UNLISTED      galbladder removal    HX CHOLECYSTECTOMY  1990    HX GYN      tubal ligation    HX GYN      tubial     HX OTHER SURGICAL      tubal ligation    HX TUBAL LIGATION  1994     Social History     Socioeconomic History    Marital status: SINGLE     Spouse name: Not on file    Number of children: Not on file    Years of education: Not on file    Highest education level: Not on file   Occupational History    Not on file   Social Needs    Financial resource strain: Not on file    Food insecurity:     Worry: Not on file     Inability: Not on file    Transportation needs:     Medical: Not on file     Non-medical: Not on file   Tobacco Use    Smoking status: Current Every Day Smoker     Packs/day: 1.00     Years: 30.00     Pack years: 30.00     Types: Cigarettes    Smokeless tobacco: Never Used   Substance and Sexual Activity    Alcohol use: No    Drug use: Yes     Types: Marijuana    Sexual activity: Yes Partners: Male     Birth control/protection: Surgical   Lifestyle    Physical activity:     Days per week: Not on file     Minutes per session: Not on file    Stress: Not on file   Relationships    Social connections:     Talks on phone: Not on file     Gets together: Not on file     Attends Religion service: Not on file     Active member of club or organization: Not on file     Attends meetings of clubs or organizations: Not on file     Relationship status: Not on file    Intimate partner violence:     Fear of current or ex partner: Not on file     Emotionally abused: Not on file     Physically abused: Not on file     Forced sexual activity: Not on file   Other Topics Concern    Not on file   Social History Narrative    ** Merged History Encounter **          Family History   Problem Relation Age of Onset    Thyroid Disease Sister     Thyroid Disease Sister     Other Mother         Marlon Salinas rusty's disease 62    Heart Disease Father     Cancer Father         lymphoma       PHYSICAL EXAMINATION:    Visit Vitals  /80 (BP 1 Location: Left arm, BP Patient Position: Sitting)   Pulse 87   Resp 18   Ht 5' 6\" (1.676 m)   Wt 71.2 kg (157 lb)   SpO2 100%   BMI 25.34 kg/m²     General: Well defined, nourished, and groomed individual in no acute distress.    Neck: Supple, nontender, no bruits    Heart: Regular rate and rhythm    Lungs: Clear to auscultation bilaterally    Musculoskeletal: Extremities revealed no edema and had full range of motion of joints. Positive right Finkelstein   Psych: Good mood and bright affect    NEUROLOGICAL EXAMINATION:    Mental Status: Alert and oriented to person, place, and time with recent and remote memory intact.    Cranial Nerves:    II, III, IV, VI: Visual acuity grossly intact. Visual fields are normal.    Pupils are equal, round, and reactive to light and accommodation.    Extra-ocular movements are full and fluid. Fundoscopic exam was benign, no ptosis or nystagmus.    V-XII: Hearing is grossly intact. Facial features are symmetric, with normal sensation and strength. The palate rises symmetrically and the tongue protrudes midline. Sternocleidomastoids 5/5.    Motor Examination: Normal tone, bulk, very trace weakness noted in the left upper extremity with extension, left lower extremity weakness in the hip flexor and extension, decreased left dorsiflexion. Coordination: No resting or intention tremor    Gait and Station: Unsteady while walking today favoring the left leg. There is an antalgic component to this. No muscle wasting or fasiculations noted.    Reflexes: DTRs 2+ throughout. ASSESSMENT AND PLAN    ICD-10-CM ICD-9-CM    1. MS [facility] sclerosis) ([facility] G35 340    2. Pain disorder associated with psychological and physical factors F45.42 307.89    3. Moderate anxiety F41.9 300.00    4. Moderate major depression (HCC) F32.1 296.22    5. Attention deficit disorder (ADD) without hyperactivity F98.8 314.00    6. Use of opiates for therapeutic purposes Z79.891 V58.69    7. Arthralgia, unspecified joint M25.50 719.40    8. De Quervain's tenosynovitis, right M65.4 727.04    9. Chronic pain syndrome G89.4 338.4 gabapentin (NEURONTIN) 300 mg capsule     The pain she indicates she feels is a little bit worse though it is largely manageable with the Percocet and gabapentin. Certainly, there are days where she feels as though she is chasing the pain. We discussed that she is on a moderate dose of gabapentin so perhaps we will try to increase this first before we start increasing the narcotic pain management dosage. She has a lot of arthralgias in general but specifically she does have a right de Quervain's tendinitis. We discussed what this is and she was provided with patient information self-care and exercises that hopefully will help resolve this issue.   For the most part her anxiety is well managed on Cymbalta and she is learning some additional stress management techniques such as using yoga. Increasing the gabapentin may also have some effect on her anxiety as well. With regard to the multiple sclerosis, this does appear to be stable. She continues to have baseline left-sided weakness, MS hug, spasticity, and diffuse pain. She is compliant with the rems program.  Her last Lemtrada infusion was in September 2017. Last MRI was in November 2019. Follow-up in 3 months or sooner if needed. Myriam Jeffrey

## 2020-03-04 NOTE — TELEPHONE ENCOUNTER
Patient states she needs a refill on these medications soon but they are not due for a refill just yet.

## 2020-03-05 DIAGNOSIS — G89.4 CHRONIC PAIN SYNDROME: ICD-10-CM

## 2020-03-05 RX ORDER — AMITRIPTYLINE HYDROCHLORIDE 100 MG/1
TABLET, FILM COATED ORAL
Qty: 30 TAB | Refills: 0 | Status: SHIPPED | OUTPATIENT
Start: 2020-03-05 | End: 2020-03-31 | Stop reason: SDUPTHER

## 2020-03-30 ENCOUNTER — TELEPHONE (OUTPATIENT)
Dept: NEUROLOGY | Age: 55
End: 2020-03-30

## 2020-03-30 DIAGNOSIS — G35 MS (MULTIPLE SCLEROSIS) (HCC): ICD-10-CM

## 2020-03-30 DIAGNOSIS — F98.8 ATTENTION DEFICIT DISORDER (ADD) WITHOUT HYPERACTIVITY: ICD-10-CM

## 2020-03-30 DIAGNOSIS — G89.4 CHRONIC PAIN SYNDROME: ICD-10-CM

## 2020-03-30 NOTE — TELEPHONE ENCOUNTER
----- Message from Mayank Up sent at 3/30/2020  2:10 PM EDT -----  Regarding: CR BLANCO/ TELEPHONE  Contact: [phone]  General Message/Vendor Calls    Caller's first and last name: Freddy Oakes       Reason for call: DISCUSS GETTING MEDICATION SINCE THE GOVERNOR HAS PLACED A \"STAY AT HOME ORDER\"      Callback required yes/no and why: YES      Best contact number(s): [phone]      Details to clarify the request:     Patient would like to discuss getting her medication since the \"stay at     home order\" is now set in place.  She wants to make sure she has them     all because she has MS and doesn't want to wind up in the hospital.      Mayank Up

## 2020-03-31 RX ORDER — GABAPENTIN 300 MG/1
1200 CAPSULE ORAL 3 TIMES DAILY
Qty: 1080 CAP | Refills: 2 | Status: SHIPPED | OUTPATIENT
Start: 2020-03-31 | End: 2020-06-04 | Stop reason: SDUPTHER

## 2020-03-31 RX ORDER — AMITRIPTYLINE HYDROCHLORIDE 100 MG/1
TABLET, FILM COATED ORAL
Qty: 30 TAB | Refills: 3 | Status: SHIPPED | OUTPATIENT
Start: 2020-03-31 | End: 2020-07-28

## 2020-03-31 RX ORDER — DEXTROAMPHETAMINE SACCHARATE, AMPHETAMINE ASPARTATE, DEXTROAMPHETAMINE SULFATE AND AMPHETAMINE SULFATE 2.5; 2.5; 2.5; 2.5 MG/1; MG/1; MG/1; MG/1
10 TABLET ORAL 2 TIMES DAILY
Qty: 60 TAB | Refills: 0 | Status: SHIPPED | OUTPATIENT
Start: 2020-03-31 | End: 2020-05-06 | Stop reason: SDUPTHER

## 2020-03-31 RX ORDER — OXYCODONE HYDROCHLORIDE 10 MG/1
10 TABLET ORAL
Qty: 60 TAB | Refills: 0 | Status: SHIPPED | OUTPATIENT
Start: 2020-03-31 | End: 2020-04-30

## 2020-03-31 NOTE — TELEPHONE ENCOUNTER
----- Message from Chuck Chatman sent at 3/31/2020  1:00 PM EDT -----  Regarding: NP Marvin/Refill  Medication Refill    Caller (if not patient):      Relationship of caller (if not patient):      Best contact number(s):[phone]      Name of medication and dosage if known:\"Adderall', \"Oxycodone' \"Cymbalta\"(generic) and \"Amitriptyline\"      Is patient out of this medication (yes/no):yes(\"Amitriptyline\")      Pharmacy name:[facility] listed in chart? (yes/no):yes  Pharmacy phone number:      Details to clarify the request:Pt stated she was advised by the pharmacist to have the doctor send a note stating pt \"need doctor's override on early refill\".       Chuck Chatman

## 2020-04-01 ENCOUNTER — TELEPHONE (OUTPATIENT)
Dept: NEUROLOGY | Age: 55
End: 2020-04-01

## 2020-04-01 NOTE — TELEPHONE ENCOUNTER
Letter faxed to the pharmacy.  Pharmacy called and  states they filled two filled two of her non controlled medications but will fill the oxycodone in about 8 days and the adderall in 2

## 2020-04-01 NOTE — TELEPHONE ENCOUNTER
----- Message from Chaparrita Jason sent at 4/1/2020  4:12 PM EDT -----  Regarding: BELLA Wong/telephone  General Message/Vendor Calls    Caller's first and last name:      Reason for call: The pt stated she needs a refill early and would like a phone call back. The pt stated it is ok to leave message.       Callback required yes/no and why: yes      Best contact number(s):[phone]

## 2020-05-06 DIAGNOSIS — F98.8 ATTENTION DEFICIT DISORDER (ADD) WITHOUT HYPERACTIVITY: Primary | ICD-10-CM

## 2020-05-06 NOTE — TELEPHONE ENCOUNTER
----- Message from Key Davis sent at 5/6/2020  3:18 PM EDT -----  Regarding: NP Marvin/Telephone  Medication Refill    Caller (if not patient):  pt    Relationship of caller (if not patient):  pt    Best contact number(s):  [phone]    Name of medication and dosage if known: Adderall 10 MG take twice a day, Oxycodone 10 MG take as needed for pain, Amitriptyline take one at night    Is patient out of this medication (yes/no):   No only a few days left    Pharmacy name:  [address] [place] [address] listed in chart? (yes/no):  yes  Pharmacy phone number:      Details to clarify the request:      Key Davis

## 2020-05-07 RX ORDER — DEXTROAMPHETAMINE SACCHARATE, AMPHETAMINE ASPARTATE, DEXTROAMPHETAMINE SULFATE AND AMPHETAMINE SULFATE 2.5; 2.5; 2.5; 2.5 MG/1; MG/1; MG/1; MG/1
10 TABLET ORAL 2 TIMES DAILY
Qty: 60 TAB | Refills: 0 | Status: SHIPPED | OUTPATIENT
Start: 2020-05-07 | End: 2020-06-04 | Stop reason: SDUPTHER

## 2020-05-07 RX ORDER — OXYCODONE HYDROCHLORIDE 10 MG/1
10 TABLET ORAL
Qty: 60 TAB | Refills: 0 | Status: SHIPPED | OUTPATIENT
Start: 2020-05-07 | End: 2020-06-04 | Stop reason: SDUPTHER

## 2020-06-03 DIAGNOSIS — F41.9 ANXIETY: ICD-10-CM

## 2020-06-04 ENCOUNTER — VIRTUAL VISIT (OUTPATIENT)
Dept: NEUROLOGY | Age: 55
End: 2020-06-04

## 2020-06-04 DIAGNOSIS — G35 MS (MULTIPLE SCLEROSIS) (HCC): Primary | ICD-10-CM

## 2020-06-04 DIAGNOSIS — F98.8 ATTENTION DEFICIT DISORDER (ADD) WITHOUT HYPERACTIVITY: ICD-10-CM

## 2020-06-04 DIAGNOSIS — G89.4 CHRONIC PAIN SYNDROME: ICD-10-CM

## 2020-06-04 DIAGNOSIS — G25.81 RESTLESS LEG SYNDROME: ICD-10-CM

## 2020-06-04 RX ORDER — DEXTROAMPHETAMINE SACCHARATE, AMPHETAMINE ASPARTATE, DEXTROAMPHETAMINE SULFATE AND AMPHETAMINE SULFATE 2.5; 2.5; 2.5; 2.5 MG/1; MG/1; MG/1; MG/1
10 TABLET ORAL 2 TIMES DAILY
Qty: 60 TAB | Refills: 0 | Status: SHIPPED | OUTPATIENT
Start: 2020-06-04 | End: 2020-07-01 | Stop reason: SDUPTHER

## 2020-06-04 RX ORDER — DULOXETIN HYDROCHLORIDE 60 MG/1
CAPSULE, DELAYED RELEASE ORAL
Qty: 30 CAP | Refills: 1 | Status: SHIPPED | OUTPATIENT
Start: 2020-06-04 | End: 2020-07-29

## 2020-06-04 RX ORDER — OXYCODONE HYDROCHLORIDE 10 MG/1
10 TABLET ORAL
Qty: 60 TAB | Refills: 0 | Status: SHIPPED | OUTPATIENT
Start: 2020-06-04 | End: 2020-07-01 | Stop reason: SDUPTHER

## 2020-06-04 RX ORDER — GABAPENTIN 300 MG/1
600 CAPSULE ORAL 4 TIMES DAILY
Qty: 1080 CAP | Refills: 2 | Status: SHIPPED | OUTPATIENT
Start: 2020-06-04 | End: 2021-03-30

## 2020-06-04 NOTE — PROGRESS NOTES
Tami Canales is a 48 y.o. female who was seen by synchronous (real-time) audio-video technology on 6/4/2020. Consent: Tami Canales, who was seen by synchronous (real-time) audio-video technology, and/or her healthcare decision maker, is aware that this patient-initiated, Telehealth encounter on 6/4/2020 is a billable service, with coverage as determined by her insurance carrier. She is aware that she may receive a bill and has provided verbal consent to proceed: Yes. Assessment & Plan:   Diagnoses and all orders for this visit:    1. MS (multiple sclerosis) ([facility]    2. Chronic pain syndrome  -     gabapentin (NEURONTIN) 300 mg capsule; Take 2 Caps by mouth four (4) times daily. Max Daily Amount: 2,400 mg.  -     oxyCODONE IR (ROXICODONE) 10 mg tab immediate release tablet; Take 1 Tab by mouth every four (4) hours as needed for Pain for up to 30 days. Max Daily Amount: 60 mg.    3. Attention deficit disorder (ADD) without hyperactivity  -     dextroamphetamine-amphetamine (ADDERALL) 10 mg tablet; Take 1 Tab by mouth two (2) times a day. Max Daily Amount: 20 mg.    4. Restless leg syndrome      While the gabapentin does help, she is not able to tolerate the higher doses and it does not seem to last as long as she may need. Suggested that she try taking 600mg, which seems to be as much as she can tolerate at one time, four times a day and continue to supplement with the Roxicodone. If she tolerates this better but still has increased pain, then we will add a 600mg Horizant to supplement the gabapentin she takes during the day. This may also help with the restless leg symptoms that occur primarily at night but have been noted to be present in the day as well occasionally. For the most part her anxiety is well managed on Cymbalta. ADD and fatigue are manageable with the adderall. Follow up in three months. I spent at least 20 minutes on this visit with this established patient. Subjective:    Lyndsey Johnson Gatito Elizondo is a 48 y.o. female who was seen for Multiple Sclerosis (No new c/o)    With regard to the multiple sclerosis, this does appear to be stable. She continues to have baseline left-sided weakness, MS hug, spasticity, and diffuse pain. She continues to be uncomfortable despite the gabapentin and Percocet. She has tried to increase the gabapentin and while this seems to help, the higher dose is very difficult to tolerate. It makes her feel drunk. The most she can tolerate is two at a time. It also As such, she has had to take the Percocet. For the most part her anxiety is well managed on Cymbalta. Prior to Admission medications    Medication Sig Start Date End Date Taking? Authorizing Provider   DULoxetine (CYMBALTA) 60 mg capsule TAKE ONE CAPSULE BY MOUTH DAILY 6/4/20  Yes Rosemarie NINO NP   dextroamphetamine-amphetamine (ADDERALL) 10 mg tablet Take 1 Tab by mouth two (2) times a day. Max Daily Amount: 20 mg. 5/7/20  Yes Rosemarie NINO NP   oxyCODONE IR (ROXICODONE) 10 mg tab immediate release tablet Take 1 Tab by mouth every four (4) hours as needed for Pain for up to 30 days. Max Daily Amount: 60 mg. 5/7/20 6/6/20 Yes Marcos Durham NP   amitriptyline (ELAVIL) 100 mg tablet TAKE ONE TABLET BY MOUTH ONCE NIGHTLY 3/31/20  Yes Rosemarie NINO NP   gabapentin (NEURONTIN) 300 mg capsule Take 4 Caps by mouth three (3) times daily. Max Daily Amount: 3,600 mg. 3/31/20  Yes Rosemarie NINO NP   aspirin delayed-release 81 mg tablet Take 162 mg by mouth daily as needed. Yes Provider, Historical   DULoxetine (CYMBALTA) 60 mg capsule TAKE ONE CAPSULE BY MOUTH DAILY 2/3/20 6/4/20  Rosemarie NINO NP     No Known Allergies        ROS    Objective:   Vital Signs: (As obtained by patient/caregiver at home)  There were no vitals taken for this visit.      [INSTRUCTIONS:  \"[x]\" Indicates a positive item  \"[]\" Indicates a negative item  -- DELETE ALL ITEMS NOT EXAMINED]    Constitutional: [x] Appears well-developed and well-nourished [x] No apparent distress      [] Abnormal -     Mental status: [x] Alert and awake  [x] Oriented to person/place/time [x] Able to follow commands    [] Abnormal -     Eyes:   EOM    [x]  Normal    [] Abnormal -   Sclera  [x]  Normal    [] Abnormal -          Discharge [x]  None visible   [] Abnormal -     HENT: [x] Normocephalic, atraumatic  [] Abnormal -   [x] Mouth/Throat: Mucous membranes are moist    External Ears [x] Normal  [] Abnormal -    Neck: [x] No visualized mass [] Abnormal -     Pulmonary/Chest: [x] Respiratory effort normal   [x] No visualized signs of difficulty breathing or respiratory distress        [] Abnormal -      Musculoskeletal:   [x] Normal gait with no signs of ataxia         [x] Normal range of motion of neck        [] Abnormal -     Neurological:        [x] No Facial Asymmetry (Cranial nerve 7 motor function) (limited exam due to video visit)          [x] No gaze palsy        [] Abnormal -          Skin:        [x] No significant exanthematous lesions or discoloration noted on facial skin         [] Abnormal -            Psychiatric:       [x] Normal Affect [] Abnormal -        [x] No Hallucinations    Other pertinent observable physical exam findings:-        We discussed the expected course, resolution and complications of the diagnosis(es) in detail. Medication risks, benefits, costs, interactions, and alternatives were discussed as indicated. I advised her to contact the office if her condition worsens, changes or fails to improve as anticipated. She expressed understanding with the diagnosis(es) and plan. Jeannine Hamilton is a 48 y.o. female who was evaluated by a video visit encounter for concerns as above. Patient identification was verified prior to start of the visit. A caregiver was present when appropriate.  Due to this being a TeleHealth encounter (During [facility] public health emergency), evaluation of the following organ systems was limited: Vitals/Constitutional/EENT/Resp/CV/GI//MS/Neuro/Skin/Heme-Lymph-Imm. Pursuant to the emergency declaration under the [address] [place] [address], [facility] waiver authority and the [employer] and [employer] Act, this Virtual  Visit was conducted, with patient's (and/or legal guardian's) consent, to reduce the patient's risk of exposure to COVID-19 and provide necessary medical care. Services were provided through a video synchronous discussion virtually to substitute for in-person clinic visit. Patient and provider were located at their individual homes.       Cholo Larson NP

## 2020-07-01 DIAGNOSIS — F98.8 ATTENTION DEFICIT DISORDER (ADD) WITHOUT HYPERACTIVITY: ICD-10-CM

## 2020-07-01 DIAGNOSIS — G89.4 CHRONIC PAIN SYNDROME: ICD-10-CM

## 2020-07-01 RX ORDER — DEXTROAMPHETAMINE SACCHARATE, AMPHETAMINE ASPARTATE, DEXTROAMPHETAMINE SULFATE AND AMPHETAMINE SULFATE 2.5; 2.5; 2.5; 2.5 MG/1; MG/1; MG/1; MG/1
10 TABLET ORAL 2 TIMES DAILY
Qty: 60 TAB | Refills: 0 | Status: SHIPPED | OUTPATIENT
Start: 2020-07-01 | End: 2020-07-28 | Stop reason: SDUPTHER

## 2020-07-01 RX ORDER — OXYCODONE HYDROCHLORIDE 10 MG/1
10 TABLET ORAL
Qty: 60 TAB | Refills: 0 | Status: SHIPPED | OUTPATIENT
Start: 2020-07-01 | End: 2020-07-28 | Stop reason: SDUPTHER

## 2020-07-28 ENCOUNTER — TELEPHONE (OUTPATIENT)
Dept: NEUROLOGY | Age: 55
End: 2020-07-28

## 2020-07-28 DIAGNOSIS — G89.4 CHRONIC PAIN SYNDROME: ICD-10-CM

## 2020-07-28 DIAGNOSIS — F98.8 ATTENTION DEFICIT DISORDER (ADD) WITHOUT HYPERACTIVITY: ICD-10-CM

## 2020-07-28 RX ORDER — AMITRIPTYLINE HYDROCHLORIDE 100 MG/1
TABLET, FILM COATED ORAL
Qty: 30 TAB | Refills: 2 | Status: SHIPPED | OUTPATIENT
Start: 2020-07-28 | End: 2020-11-03

## 2020-07-29 DIAGNOSIS — F41.9 ANXIETY: ICD-10-CM

## 2020-07-29 RX ORDER — OXYCODONE HYDROCHLORIDE 10 MG/1
10 TABLET ORAL
Qty: 60 TAB | Refills: 0 | Status: SHIPPED | OUTPATIENT
Start: 2020-07-29 | End: 2020-08-25 | Stop reason: SDUPTHER

## 2020-07-29 RX ORDER — DEXTROAMPHETAMINE SACCHARATE, AMPHETAMINE ASPARTATE, DEXTROAMPHETAMINE SULFATE AND AMPHETAMINE SULFATE 2.5; 2.5; 2.5; 2.5 MG/1; MG/1; MG/1; MG/1
10 TABLET ORAL 2 TIMES DAILY
Qty: 60 TAB | Refills: 0 | Status: SHIPPED | OUTPATIENT
Start: 2020-07-29 | End: 2020-08-25 | Stop reason: SDUPTHER

## 2020-07-29 RX ORDER — DULOXETIN HYDROCHLORIDE 60 MG/1
CAPSULE, DELAYED RELEASE ORAL
Qty: 30 CAP | Refills: 0 | Status: SHIPPED | OUTPATIENT
Start: 2020-07-29 | End: 2020-08-26

## 2020-08-25 DIAGNOSIS — F98.8 ATTENTION DEFICIT DISORDER (ADD) WITHOUT HYPERACTIVITY: ICD-10-CM

## 2020-08-25 DIAGNOSIS — G89.4 CHRONIC PAIN SYNDROME: ICD-10-CM

## 2020-08-25 NOTE — TELEPHONE ENCOUNTER
Requested Prescriptions     Pending Prescriptions Disp Refills    dextroamphetamine-amphetamine (ADDERALL) 10 mg tablet 60 Tab 0     Sig: Take 1 Tab by mouth two (2) times a day. Max Daily Amount: 20 mg.    oxyCODONE IR (ROXICODONE) 10 mg tab immediate release tablet 60 Tab 0     Sig: Take 1 Tab by mouth every four (4) hours as needed for Pain for up to 30 days. Max Daily Amount: 60 mg.

## 2020-08-26 DIAGNOSIS — F41.9 ANXIETY: ICD-10-CM

## 2020-08-26 RX ORDER — DULOXETIN HYDROCHLORIDE 60 MG/1
CAPSULE, DELAYED RELEASE ORAL
Qty: 30 CAP | Refills: 0 | Status: SHIPPED | OUTPATIENT
Start: 2020-08-26 | End: 2020-09-22

## 2020-08-27 RX ORDER — OXYCODONE HYDROCHLORIDE 10 MG/1
10 TABLET ORAL
Qty: 60 TAB | Refills: 0 | Status: SHIPPED | OUTPATIENT
Start: 2020-08-27 | End: 2020-09-24 | Stop reason: SDUPTHER

## 2020-08-27 RX ORDER — DEXTROAMPHETAMINE SACCHARATE, AMPHETAMINE ASPARTATE, DEXTROAMPHETAMINE SULFATE AND AMPHETAMINE SULFATE 2.5; 2.5; 2.5; 2.5 MG/1; MG/1; MG/1; MG/1
10 TABLET ORAL 2 TIMES DAILY
Qty: 60 TAB | Refills: 0 | Status: SHIPPED | OUTPATIENT
Start: 2020-08-27 | End: 2020-09-24 | Stop reason: SDUPTHER

## 2020-09-14 ENCOUNTER — OFFICE VISIT (OUTPATIENT)
Dept: NEUROLOGY | Age: 55
End: 2020-09-14
Payer: MEDICAID

## 2020-09-14 VITALS
TEMPERATURE: 94.7 F | OXYGEN SATURATION: 97 % | HEIGHT: 66 IN | WEIGHT: 154 LBS | SYSTOLIC BLOOD PRESSURE: 138 MMHG | RESPIRATION RATE: 17 BRPM | HEART RATE: 70 BPM | DIASTOLIC BLOOD PRESSURE: 80 MMHG | BODY MASS INDEX: 24.75 KG/M2

## 2020-09-14 DIAGNOSIS — G89.4 CHRONIC PAIN SYNDROME: ICD-10-CM

## 2020-09-14 DIAGNOSIS — G25.81 RESTLESS LEG SYNDROME: ICD-10-CM

## 2020-09-14 DIAGNOSIS — G35 MS (MULTIPLE SCLEROSIS) (HCC): Primary | ICD-10-CM

## 2020-09-14 DIAGNOSIS — F98.8 ATTENTION DEFICIT DISORDER (ADD) WITHOUT HYPERACTIVITY: ICD-10-CM

## 2020-09-14 DIAGNOSIS — S46.912A STRAIN OF LEFT SHOULDER, INITIAL ENCOUNTER: ICD-10-CM

## 2020-09-14 DIAGNOSIS — F41.9 MODERATE ANXIETY: ICD-10-CM

## 2020-09-14 DIAGNOSIS — F32.1 MODERATE MAJOR DEPRESSION (HCC): ICD-10-CM

## 2020-09-14 PROCEDURE — 99214 OFFICE O/P EST MOD 30 MIN: CPT | Performed by: NURSE PRACTITIONER

## 2020-09-14 RX ORDER — PREDNISONE 10 MG/1
TABLET ORAL
Qty: 42 TAB | Refills: 0 | Status: SHIPPED | OUTPATIENT
Start: 2020-09-14 | End: 2021-05-05 | Stop reason: ALTCHOICE

## 2020-09-22 DIAGNOSIS — F41.9 ANXIETY: ICD-10-CM

## 2020-09-22 RX ORDER — DULOXETIN HYDROCHLORIDE 60 MG/1
CAPSULE, DELAYED RELEASE ORAL
Qty: 30 CAP | Refills: 0 | Status: SHIPPED | OUTPATIENT
Start: 2020-09-22 | End: 2020-10-20

## 2020-09-23 DIAGNOSIS — G89.4 CHRONIC PAIN SYNDROME: ICD-10-CM

## 2020-09-23 DIAGNOSIS — F98.8 ATTENTION DEFICIT DISORDER (ADD) WITHOUT HYPERACTIVITY: ICD-10-CM

## 2020-09-24 RX ORDER — DEXTROAMPHETAMINE SACCHARATE, AMPHETAMINE ASPARTATE, DEXTROAMPHETAMINE SULFATE AND AMPHETAMINE SULFATE 2.5; 2.5; 2.5; 2.5 MG/1; MG/1; MG/1; MG/1
10 TABLET ORAL 2 TIMES DAILY
Qty: 60 TAB | Refills: 0 | Status: SHIPPED | OUTPATIENT
Start: 2020-09-24 | End: 2020-10-23 | Stop reason: SDUPTHER

## 2020-09-24 RX ORDER — OXYCODONE HYDROCHLORIDE 10 MG/1
10 TABLET ORAL
Qty: 60 TAB | Refills: 0 | Status: SHIPPED | OUTPATIENT
Start: 2020-09-24 | End: 2020-10-23 | Stop reason: SDUPTHER

## 2020-10-20 DIAGNOSIS — F41.9 ANXIETY: ICD-10-CM

## 2020-10-20 DIAGNOSIS — G89.4 CHRONIC PAIN SYNDROME: ICD-10-CM

## 2020-10-20 DIAGNOSIS — F98.8 ATTENTION DEFICIT DISORDER (ADD) WITHOUT HYPERACTIVITY: ICD-10-CM

## 2020-10-20 RX ORDER — DULOXETIN HYDROCHLORIDE 60 MG/1
CAPSULE, DELAYED RELEASE ORAL
Qty: 30 CAP | Refills: 3 | Status: SHIPPED | OUTPATIENT
Start: 2020-10-20 | End: 2021-01-18

## 2020-10-23 RX ORDER — OXYCODONE HYDROCHLORIDE 10 MG/1
10 TABLET ORAL
Qty: 60 TAB | Refills: 0 | Status: SHIPPED | OUTPATIENT
Start: 2020-10-23 | End: 2020-11-18 | Stop reason: SDUPTHER

## 2020-10-23 RX ORDER — DEXTROAMPHETAMINE SACCHARATE, AMPHETAMINE ASPARTATE, DEXTROAMPHETAMINE SULFATE AND AMPHETAMINE SULFATE 2.5; 2.5; 2.5; 2.5 MG/1; MG/1; MG/1; MG/1
10 TABLET ORAL 2 TIMES DAILY
Qty: 60 TAB | Refills: 0 | Status: SHIPPED | OUTPATIENT
Start: 2020-10-23 | End: 2020-11-18 | Stop reason: SDUPTHER

## 2020-11-18 DIAGNOSIS — F98.8 ATTENTION DEFICIT DISORDER (ADD) WITHOUT HYPERACTIVITY: ICD-10-CM

## 2020-11-18 DIAGNOSIS — G89.4 CHRONIC PAIN SYNDROME: ICD-10-CM

## 2020-11-19 RX ORDER — OXYCODONE HYDROCHLORIDE 10 MG/1
10 TABLET ORAL
Qty: 60 TAB | Refills: 0 | Status: SHIPPED | OUTPATIENT
Start: 2020-11-19 | End: 2020-12-15 | Stop reason: SDUPTHER

## 2020-11-19 RX ORDER — DEXTROAMPHETAMINE SACCHARATE, AMPHETAMINE ASPARTATE, DEXTROAMPHETAMINE SULFATE AND AMPHETAMINE SULFATE 2.5; 2.5; 2.5; 2.5 MG/1; MG/1; MG/1; MG/1
10 TABLET ORAL 2 TIMES DAILY
Qty: 60 TAB | Refills: 0 | Status: SHIPPED | OUTPATIENT
Start: 2020-11-19 | End: 2021-01-08 | Stop reason: SDUPTHER

## 2020-12-15 DIAGNOSIS — G89.4 CHRONIC PAIN SYNDROME: ICD-10-CM

## 2020-12-15 NOTE — TELEPHONE ENCOUNTER
Pt is requesting a refill on the following:  Requested Prescriptions     Pending Prescriptions Disp Refills    oxyCODONE IR (ROXICODONE) 10 mg tab immediate release tablet 60 Tab 0     Sig: Take 1 Tab by mouth every four (4) hours as needed for Pain for up to 30 days. Max Daily Amount: 60 mg.

## 2020-12-17 RX ORDER — OXYCODONE HYDROCHLORIDE 10 MG/1
10 TABLET ORAL
Qty: 60 TAB | Refills: 0 | Status: SHIPPED | OUTPATIENT
Start: 2020-12-17 | End: 2021-01-12 | Stop reason: SDUPTHER

## 2021-01-05 DIAGNOSIS — F98.8 ATTENTION DEFICIT DISORDER (ADD) WITHOUT HYPERACTIVITY: ICD-10-CM

## 2021-01-05 NOTE — TELEPHONE ENCOUNTER
----- Message from [facility] sent at 1/5/2021  1:49 PM EST -----  Regarding: NP Marvin/Refill  Medication Refill    Caller (if not patient):  N/A      Relationship of caller (if not patient):  N/A      Best contact number(s):  [phone]      Name of medication and dosage if known: Adderrall, 10mg      Is patient out of this medication (yes/no): Y      Pharm[facility] listed in chart? (yes/no):   Y    Pharmacy phone number:  [phone]      Details to clarify the request:  N/A      [facility]

## 2021-01-11 RX ORDER — DEXTROAMPHETAMINE SACCHARATE, AMPHETAMINE ASPARTATE, DEXTROAMPHETAMINE SULFATE AND AMPHETAMINE SULFATE 2.5; 2.5; 2.5; 2.5 MG/1; MG/1; MG/1; MG/1
10 TABLET ORAL 2 TIMES DAILY
Qty: 60 TAB | Refills: 0 | Status: SHIPPED | OUTPATIENT
Start: 2021-01-11 | End: 2021-02-24 | Stop reason: SDUPTHER

## 2021-01-12 ENCOUNTER — HOSPITAL ENCOUNTER (OUTPATIENT)
Dept: MRI IMAGING | Age: 56
Discharge: HOME | End: 2021-01-12
Attending: NURSE PRACTITIONER
Payer: MEDICAID

## 2021-01-12 ENCOUNTER — OFFICE VISIT (OUTPATIENT)
Dept: NEUROLOGY | Age: 56
End: 2021-01-12
Payer: MEDICAID

## 2021-01-12 VITALS
HEART RATE: 76 BPM | SYSTOLIC BLOOD PRESSURE: 130 MMHG | HEIGHT: 66 IN | OXYGEN SATURATION: 98 % | RESPIRATION RATE: 18 BRPM | WEIGHT: 151.8 LBS | TEMPERATURE: 96.8 F | DIASTOLIC BLOOD PRESSURE: 82 MMHG | BODY MASS INDEX: 24.4 KG/M2

## 2021-01-12 DIAGNOSIS — F45.42 PAIN DISORDER ASSOCIATED WITH PSYCHOLOGICAL AND PHYSICAL FACTORS: ICD-10-CM

## 2021-01-12 DIAGNOSIS — G35 MS (MULTIPLE SCLEROSIS) (HCC): Primary | ICD-10-CM

## 2021-01-12 DIAGNOSIS — F98.8 ATTENTION DEFICIT DISORDER (ADD) WITHOUT HYPERACTIVITY: ICD-10-CM

## 2021-01-12 DIAGNOSIS — G89.4 CHRONIC PAIN SYNDROME: ICD-10-CM

## 2021-01-12 DIAGNOSIS — F41.9 ANXIETY: ICD-10-CM

## 2021-01-12 DIAGNOSIS — F41.9 MODERATE ANXIETY: ICD-10-CM

## 2021-01-12 DIAGNOSIS — G35 MS (MULTIPLE SCLEROSIS) (HCC): ICD-10-CM

## 2021-01-12 DIAGNOSIS — G25.81 RESTLESS LEG SYNDROME: ICD-10-CM

## 2021-01-12 DIAGNOSIS — F32.1 MODERATE MAJOR DEPRESSION (HCC): ICD-10-CM

## 2021-01-12 DIAGNOSIS — Z79.891 USE OF OPIATES FOR THERAPEUTIC PURPOSES: ICD-10-CM

## 2021-01-12 PROCEDURE — 70551 MRI BRAIN STEM W/O DYE: CPT

## 2021-01-12 PROCEDURE — 99214 OFFICE O/P EST MOD 30 MIN: CPT | Performed by: NURSE PRACTITIONER

## 2021-01-12 RX ORDER — OXYCODONE HYDROCHLORIDE 10 MG/1
10 TABLET ORAL
Qty: 60 TAB | Refills: 0 | Status: SHIPPED | OUTPATIENT
Start: 2021-01-12 | End: 2021-02-12 | Stop reason: SDUPTHER

## 2021-01-12 RX ORDER — AMITRIPTYLINE HYDROCHLORIDE 100 MG/1
TABLET, FILM COATED ORAL
Qty: 30 TAB | Refills: 0 | Status: SHIPPED | OUTPATIENT
Start: 2021-01-12 | End: 2021-02-09

## 2021-01-12 NOTE — PROGRESS NOTES
[address] [facility],5Th [address]  [address]   [place] [address] [facility]   [phone] Office   [phone] Fax           Date:  21     Name:  Tyler Flood  :  1965  MRN:  [id number]     PCP:  None    Chief Complaint   Patient presents with    Multiple Sclerosis     blurred vision, poor sleep     HISTORY OF PRESENT ILLNESS: Follow up for RRMS. At baseline, she does have chronic diffuse joint pain related to arthritic changes, chronic diffuse pain, MS hug. This is actually pretty well managed between the oxycodone, amitriptyline, and gabapentin. She continues with the Cymbalta which has helped her to feel a little less angry. Anxiety and depression is worse due to situational issues. Daughter essentially kicked her out and she is sofa surfing at the moment and actually slept in her car a couple of nights when it was warm a couple of weeks ago. She is only getting a couple of hours of sleep per night as she is unable to relax enough to fall asleep though she is tired. Memory and concentration are still an issue though the Adderall seems to help with the fatigue and concentration. Her last Lemtrada dose was in 2017. She is compliant with the REMS program but since [facility] went out of business she has not had her labs done. No new neurological symptoms or exacerbation. She denies having any significant issues with her bowel or bladder. No changes in her hearing or vision. Recap from her last office visit:  The pain she indicates she feels is a little bit worse but this is largely related to the issue in her left shoulder. She does not have any loss of range of motion and in fact movement seems to make it feel better so I tend to think that this is probably more of a strain than anything else. I did provide her with a tapering dose steroid to help decrease the inflammation.   As this is a chronic problem that seems to worsen at times, she was referred to orthopedics at [facility]. Otherwise, her pain is largely manageable with her oxycodone, gabapentin, Cymbalta, amitriptyline. For the most part her anxiety is well managed on Cymbalta and she is learning some additional stress management techniques such as using yoga. Increasing the gabapentin may also have some effect on her anxiety as well. With regard to the multiple sclerosis, this does appear to be stable. She continues to have baseline left-sided weakness, MS hug, spasticity, and diffuse pain. She is compliant with the REMS program.  Her last Lemtrada infusion was in September 2017. Last MRI was in November 2019 and she will be due for serial imaging so this was ordered today. Follow-up in 3 months or sooner if needed. Except as noted above, denies  fever, chills, cough. No CP or SOB. No Weight loss. Appetite good. Sleeping well. No sweats. No edema. No bruising or bleeding. No nausea or vomit. No diarrhea. No frequency, urgency, No depressive sxs. No anxiety. Denies sore throat, nasal congestion, nasal discharge, epistaxis, tinnitus, hearing loss, back pain, muscle pain       Current Outpatient Medications   Medication Sig    dextroamphetamine-amphetamine (ADDERALL) 10 mg tablet Take 1 Tab by mouth two (2) times a day. Max Daily Amount: 20 mg.    oxyCODONE IR (ROXICODONE) 10 mg tab immediate release tablet Take 1 Tab by mouth every four (4) hours as needed for Pain for up to 30 days. Max Daily Amount: 60 mg.    amitriptyline (ELAVIL) 100 mg tablet TAKE ONE TABLET BY MOUTH ONCE NIGHTLY    DULoxetine (CYMBALTA) 60 mg capsule TAKE ONE CAPSULE BY MOUTH DAILY    gabapentin (NEURONTIN) 300 mg capsule Take 2 Caps by mouth four (4) times daily. Max Daily Amount: 2,400 mg.     predniSONE (DELTASONE) 10 mg tablet 6 po x2 days, 5 po x 2days, 4 po x 2days, 3 po x2days, 2 po x2days, 1 po x 2days     No current facility-administered medications for this visit.       No Known Allergies  Past Medical History:   Diagnosis Date    Delivery normal     x2    Fibromyalgia     Headache(784.0)     Migraine headache     Multiple sclerosis (HCC)     Sarcoidosis     Sun-damaged skin     Tanning bed exposure      Past Surgical History:   Procedure Laterality Date    HX CHOLECYSTECTOMY  1990    HX GYN      tubal ligation    HX GYN      tubial     HX OTHER SURGICAL      tubal ligation    HX TUBAL LIGATION  1994    OR ABDOMEN SURGERY PROC UNLISTED      galbladder removal     Social History     Socioeconomic History    Marital status: SINGLE     Spouse name: Not on file    Number of children: Not on file    Years of education: Not on file    Highest education level: Not on file   Occupational History    Not on file   Social Needs    Financial resource strain: Not on file    Food insecurity     Worry: Not on file     Inability: Not on file    Transportation needs     Medical: Not on file     Non-medical: Not on file   Tobacco Use    Smoking status: Current Every Day Smoker     Packs/day: 1.00     Years: 30.00     Pack years: 30.00     Types: Cigarettes    Smokeless tobacco: Never Used   Substance and Sexual Activity    Alcohol use: No    Drug use: Yes     Types: Marijuana    Sexual activity: Yes     Partners: Male     Birth control/protection: Surgical   Lifestyle    Physical activity     Days per week: Not on file     Minutes per session: Not on file    Stress: Not on file   Relationships    Social connections     Talks on phone: Not on file     Gets together: Not on file     Attends Sabianist service: Not on file     Active member of club or organization: Not on file     Attends meetings of clubs or organizations: Not on file     Relationship status: Not on file    Intimate partner violence     Fear of current or ex partner: Not on file     Emotionally abused: Not on file     Physically abused: Not on file     Forced sexual activity: Not on file   Other Topics Concern    Not on file   Social History Narrative    ** Merged History Encounter **          Family History   Problem Relation Age of Onset    Thyroid Disease Sister     Thyroid Disease Sister     Other Mother         [place] rusty's disease 62    Heart Disease Father     Cancer Father         lymphoma       PHYSICAL EXAMINATION:    Visit Vitals  /82   Pulse 76   Temp 96.8 °F (36 °C) (Temporal)   Resp 18   Ht 5' 6\" (1.676 m)   Wt 68.9 kg (151 lb 12.8 oz)   SpO2 98%   BMI 24.50 kg/m²     General: Well defined, nourished, and groomed individual in no acute distress.    Neck: Supple, nontender, no bruits    Heart: Regular rate and rhythm    Lungs: Clear to auscultation bilaterally    Musculoskeletal: Extremities revealed no edema and had full range of motion of joints. Positive right Finkelstein   Psych: Good mood and bright affect    NEUROLOGICAL EXAMINATION:    Mental Status: Alert and oriented to person, place, and time with recent and remote memory intact.    Cranial Nerves:    II, III, IV, VI: Visual acuity grossly intact. Visual fields are normal.    Pupils are equal, round, and reactive to light and accommodation.    Extra-ocular movements are full and fluid. Fundoscopic exam was benign, no ptosis or nystagmus.    V-XII: Hearing is grossly intact. Facial features are symmetric, with normal sensation and strength. The palate rises symmetrically and the tongue protrudes midline. Sternocleidomastoids 5/5.    Motor Examination: Normal tone, bulk, very trace weakness noted in the left upper extremity with extension, left lower extremity weakness in the hip flexor and extension, decreased left dorsiflexion. Coordination: No resting or intention tremor    Gait and Station: Unsteady while walking today favoring the left leg. There is an antalgic component to this. No muscle wasting or fasiculations noted.    Reflexes: DTRs 2+ throughout.      ASSESSMENT AND PLAN    ICD-10-CM ICD-9-CM    1. MS (multiple sclerosis) ([facility] [address]  G35 340    2. Chronic pain syndrome  G89.4 338.4 oxyCODONE IR (ROXICODONE) 10 mg tab immediate release tablet      amitriptyline (ELAVIL) 100 mg tablet   3. Attention deficit disorder (ADD) without hyperactivity  F98.8 314.00    4. Anxiety  F41.9 300.00    5. Restless leg syndrome  G25.81 333.94    6. Moderate anxiety  F41.9 300.00    7. Moderate major depression (HCC)  F32.1 296.22    8. Pain disorder associated with psychological and physical factors  F45.42 307.89    9. Use of opiates for therapeutic purposes  Z79.891 V58.69      Relapsing remitting multiple sclerosis which is actually pretty stable following her Lemtrada therapy. She does continue to participate with the rems program as required. Baseline symptoms of attention deficit disorder and mild cognitive impairment are manageable with the Adderall. Moderate anxiety and depression are fairly well managed on Cymbalta though there are some other situational issues going on currently they are making this harder. Restless leg issues are well managed with the gabapentin which also helps with her chronic pain issues for which she is on amitriptyline and oxycodone. This combination has actually to manage the MS related pain and arthritic issues well. Follow-up in 3 months or sooner if needed. Myriam Juan

## 2021-01-18 DIAGNOSIS — F41.9 ANXIETY: ICD-10-CM

## 2021-01-18 RX ORDER — DULOXETIN HYDROCHLORIDE 60 MG/1
CAPSULE, DELAYED RELEASE ORAL
Qty: 90 CAP | Refills: 2 | Status: SHIPPED | OUTPATIENT
Start: 2021-01-18 | End: 2021-05-05 | Stop reason: SDUPTHER

## 2021-02-07 DIAGNOSIS — G89.4 CHRONIC PAIN SYNDROME: ICD-10-CM

## 2021-02-09 RX ORDER — AMITRIPTYLINE HYDROCHLORIDE 100 MG/1
TABLET, FILM COATED ORAL
Qty: 30 TAB | Refills: 0 | Status: SHIPPED | OUTPATIENT
Start: 2021-02-09 | End: 2021-03-12

## 2021-02-11 DIAGNOSIS — G89.4 CHRONIC PAIN SYNDROME: ICD-10-CM

## 2021-02-11 NOTE — TELEPHONE ENCOUNTER
----- Message from [employer] sent at 2/11/2021 10:43 AM EST ----- Regarding: NP Marvin/  Refill Medication Refill Caller (if not patient): 
 
 
Relationship of caller (if not patient): 
 
 
Best contact number(s):[phone] Name of medication and dosage if known: Oxycodone 10mg, Adderall 10mg Is patient out of this medication (yes/no): yes Pharmacy name: [address] [place] [address] Pharmacy listed in chart? (yes/no): yes Pharmacy phone number: 
 
 
Details to clarify the request: pt is requesting a refill on her Rx [employer]

## 2021-02-15 RX ORDER — OXYCODONE HYDROCHLORIDE 10 MG/1
10 TABLET ORAL
Qty: 60 TAB | Refills: 0 | Status: SHIPPED | OUTPATIENT
Start: 2021-02-15 | End: 2021-03-15 | Stop reason: SDUPTHER

## 2021-02-19 ENCOUNTER — TELEPHONE (OUTPATIENT)
Dept: NEUROLOGY | Age: 56
End: 2021-02-19

## 2021-02-19 DIAGNOSIS — F98.8 ATTENTION DEFICIT DISORDER (ADD) WITHOUT HYPERACTIVITY: ICD-10-CM

## 2021-02-19 NOTE — TELEPHONE ENCOUNTER
----- Message from Merari Ochoa sent at 2/19/2021 12:17 PM EST -----  Regarding: NP Marvin/Telephone  General Message/Vendor Calls    Caller's first and last name: N/A      Reason for call: Pt would like to get the status her medication refill request for Rx Adderall      Callback required yes/no and why: yes, to get the status her medication refill request for Rx Adderall      Best contact number(s): [phone]      Details to clarify the request: Pt would like to get the status her medication refill request for Rx Adderall to be called into her pharmacy.        Merari Ochoa

## 2021-02-24 RX ORDER — DEXTROAMPHETAMINE SACCHARATE, AMPHETAMINE ASPARTATE, DEXTROAMPHETAMINE SULFATE AND AMPHETAMINE SULFATE 2.5; 2.5; 2.5; 2.5 MG/1; MG/1; MG/1; MG/1
10 TABLET ORAL 2 TIMES DAILY
Qty: 60 TAB | Refills: 0 | Status: SHIPPED | OUTPATIENT
Start: 2021-02-24 | End: 2021-04-06 | Stop reason: SDUPTHER

## 2021-03-09 DIAGNOSIS — G89.4 CHRONIC PAIN SYNDROME: ICD-10-CM

## 2021-03-11 NOTE — TELEPHONE ENCOUNTER
----- Message from Judd Palma sent at 3/11/2021 12:16 PM EST -----  Regarding: NP Marvin/refill  Medication Refill    Caller (if not patient):      Relationship of caller (if not patient):      Best contact number(s): [phone]       Name of medication and dosage if known: Oxycodone 10mg,  adderall 10mg       Is patient out of this medication (yes/no): yes       Pharmacy name: [address] listed in chart? (yes/no): yes   Pharmacy phone number:      Details to clarify the request: pt is requesting to refill her medications       Judd Palma

## 2021-03-12 RX ORDER — AMITRIPTYLINE HYDROCHLORIDE 100 MG/1
TABLET, FILM COATED ORAL
Qty: 30 TAB | Refills: 0 | Status: SHIPPED | OUTPATIENT
Start: 2021-03-12 | End: 2021-04-12

## 2021-03-15 DIAGNOSIS — G89.4 CHRONIC PAIN SYNDROME: ICD-10-CM

## 2021-03-16 RX ORDER — OXYCODONE HYDROCHLORIDE 10 MG/1
10 TABLET ORAL
Qty: 60 TAB | Refills: 0 | Status: SHIPPED | OUTPATIENT
Start: 2021-03-16 | End: 2021-04-14 | Stop reason: SDUPTHER

## 2021-03-30 DIAGNOSIS — G89.4 CHRONIC PAIN SYNDROME: ICD-10-CM

## 2021-03-30 RX ORDER — GABAPENTIN 300 MG/1
CAPSULE ORAL
Qty: 1080 CAP | Refills: 1 | Status: SHIPPED | OUTPATIENT
Start: 2021-03-30 | End: 2021-05-05 | Stop reason: SDUPTHER

## 2021-04-12 DIAGNOSIS — G89.4 CHRONIC PAIN SYNDROME: ICD-10-CM

## 2021-04-12 RX ORDER — AMITRIPTYLINE HYDROCHLORIDE 100 MG/1
TABLET, FILM COATED ORAL
Qty: 30 TAB | Refills: 0 | Status: SHIPPED | OUTPATIENT
Start: 2021-04-12 | End: 2021-05-11

## 2021-04-14 DIAGNOSIS — G89.4 CHRONIC PAIN SYNDROME: ICD-10-CM

## 2021-04-14 NOTE — TELEPHONE ENCOUNTER
----- Message from Mary Singh sent at 4/14/2021 10:53 AM EDT ----- Regarding: NP, Marvin/Refill Medication Refill Caller (if not patient):  PT 
 
 
Relationship of caller (if not patient): 
 
 
Best contact number(s):  [phone] Name of medication and dosage if known:  \"Roxycodone 10mg\" 61 Qty Is patient out of this medication (yes/no):   2 pills Pharmacy name:  [facility], [address] [facility] Pharmacy listed in chart? (yes/no):  Yes Pharmacy phone number: [phone] Details to clarify the request:   Pt r/s today's appt 04/14/21 at 11:00AM to Wed, 05/05/21 at 11:00AM 
 
 
Mary Singh

## 2021-04-15 RX ORDER — OXYCODONE HYDROCHLORIDE 10 MG/1
10 TABLET ORAL
Qty: 60 TAB | Refills: 0 | Status: SHIPPED | OUTPATIENT
Start: 2021-04-15 | End: 2021-05-13 | Stop reason: SDUPTHER

## 2021-05-05 ENCOUNTER — OFFICE VISIT (OUTPATIENT)
Dept: NEUROLOGY | Age: 56
End: 2021-05-05
Payer: MEDICAID

## 2021-05-05 VITALS
HEIGHT: 66 IN | WEIGHT: 146 LBS | BODY MASS INDEX: 23.46 KG/M2 | OXYGEN SATURATION: 98 % | HEART RATE: 86 BPM | SYSTOLIC BLOOD PRESSURE: 126 MMHG | RESPIRATION RATE: 18 BRPM | DIASTOLIC BLOOD PRESSURE: 82 MMHG | TEMPERATURE: 98.1 F

## 2021-05-05 DIAGNOSIS — F32.1 MODERATE MAJOR DEPRESSION (HCC): ICD-10-CM

## 2021-05-05 DIAGNOSIS — G35 MS (MULTIPLE SCLEROSIS) (HCC): Primary | ICD-10-CM

## 2021-05-05 DIAGNOSIS — F41.9 MODERATE ANXIETY: ICD-10-CM

## 2021-05-05 DIAGNOSIS — F41.9 ANXIETY: ICD-10-CM

## 2021-05-05 DIAGNOSIS — G25.81 RESTLESS LEG SYNDROME: ICD-10-CM

## 2021-05-05 DIAGNOSIS — F98.8 ATTENTION DEFICIT DISORDER (ADD) WITHOUT HYPERACTIVITY: ICD-10-CM

## 2021-05-05 DIAGNOSIS — Z51.81 MEDICATION MONITORING ENCOUNTER: ICD-10-CM

## 2021-05-05 DIAGNOSIS — G89.4 CHRONIC PAIN SYNDROME: ICD-10-CM

## 2021-05-05 PROCEDURE — 99214 OFFICE O/P EST MOD 30 MIN: CPT | Performed by: NURSE PRACTITIONER

## 2021-05-05 RX ORDER — DEXTROAMPHETAMINE SACCHARATE, AMPHETAMINE ASPARTATE, DEXTROAMPHETAMINE SULFATE AND AMPHETAMINE SULFATE 2.5; 2.5; 2.5; 2.5 MG/1; MG/1; MG/1; MG/1
10 TABLET ORAL 2 TIMES DAILY
Qty: 60 TAB | Refills: 0 | Status: SHIPPED | OUTPATIENT
Start: 2021-05-05 | End: 2021-06-07 | Stop reason: SDUPTHER

## 2021-05-05 RX ORDER — DULOXETIN HYDROCHLORIDE 60 MG/1
CAPSULE, DELAYED RELEASE ORAL
Qty: 90 CAP | Refills: 2 | Status: SHIPPED | OUTPATIENT
Start: 2021-05-05 | End: 2022-02-08

## 2021-05-05 RX ORDER — GABAPENTIN 300 MG/1
CAPSULE ORAL
Qty: 1080 CAP | Refills: 1 | Status: SHIPPED | OUTPATIENT
Start: 2021-05-05 | End: 2021-12-06

## 2021-05-05 NOTE — PROGRESS NOTES
Smith Mac is a 55 y.o. female  Chief Complaint   Patient presents with    Follow-up    Multiple Sclerosis     Health Maintenance Due   Topic Date Due    Hepatitis C Screening  Never done    Pneumococcal 0-64 years (1 of 1 - PPSV23) Never done    COVID-19 Vaccine (1) Never done    DTaP/Tdap/Td series (1 - Tdap) Never done    Lipid Screen  Never done    Shingrix Vaccine Age 50> (1 of 2) Never done    Colorectal Cancer Screening Combo  Never done    Breast Cancer Screen Mammogram  Never done    PAP AKA CERVICAL CYTOLOGY  05/06/2016     Visit Vitals  /82   Pulse 86   Temp 98.1 °F (36.7 °C) (Oral)   Resp 18   Ht 5' 6\" (1.676 m)   Wt 146 lb (66.2 kg)   SpO2 98%   BMI 23.57 kg/m²

## 2021-05-05 NOTE — PROGRESS NOTES
[address] [facility],5[address]  [address]   [place] [address] [facility]   [phone] Office   [phone] Fax           Date:  21     Name:  Swetha Magdaleno  :  1965  MRN:  [id number]     PCP:  None    Chief Complaint   Patient presents with    Follow-up    Multiple Sclerosis     HISTORY OF PRESENT ILLNESS: Follow up for RRMS. At baseline, she does have chronic diffuse joint pain related to arthritic changes, chronic diffuse pain, MS hug. This is actually pretty well managed between the oxycodone, amitriptyline, and gabapentin. She continues with the Cymbalta which has helped her to feel a little less angry. Memory and concentration are still an issue though the Adderall seems to help with the fatigue and concentration. Since her last office visit, she has moved back in with her daughter. She has noted some increase in balance issues and increased weakness of her left lower leg. She is not sure if this is something she is noticing more due to the fact that she is more active or not. Her last Lemtrada dose was in 2017. She is compliant with the REMS program but has not been able to get set back up with [facility] to get her monthly draws. She denies having any significant issues with her bowel or bladder. No changes in her hearing or vision. Recap from her last office visit:  Relapsing remitting multiple sclerosis which is actually pretty stable following her Lemtrada therapy. She does continue to participate with the rems program as required. Baseline symptoms of attention deficit disorder and mild cognitive impairment are manageable with the Adderall. Moderate anxiety and depression are fairly well managed on Cymbalta though there are some other situational issues going on currently they are making this harder.   Restless leg issues are well managed with the gabapentin which also helps with her chronic pain issues for which she is on amitriptyline and oxycodone. This combination has actually to manage the MS related pain and arthritic issues well. Follow-up in 3 months or sooner if needed. Except as noted above, denies  fever, chills, cough. No CP or SOB. No Weight loss. Appetite good. Sleeping well. No sweats. No edema. No bruising or bleeding. No nausea or vomit. No diarrhea. No frequency, urgency, No depressive sxs. No anxiety. Denies sore throat, nasal congestion, nasal discharge, epistaxis, tinnitus, hearing loss, back pain, muscle pain       Current Outpatient Medications   Medication Sig    oxyCODONE IR (ROXICODONE) 10 mg tab immediate release tablet Take 1 Tab by mouth every four (4) hours as needed for Pain for up to 30 days. Max Daily Amount: 60 mg.    amitriptyline (ELAVIL) 100 mg tablet TAKE ONE TABLET BY MOUTH ONCE NIGHTLY    dextroamphetamine-amphetamine (ADDERALL) 10 mg tablet Take 1 Tab by mouth two (2) times a day. Max Daily Amount: 20 mg.    gabapentin (NEURONTIN) 300 mg capsule TAKE FOUR CAPSULES BY MOUTH THREE TIMES A DAY (MAX DAILY AMOUNT: 3600 MG)    DULoxetine (CYMBALTA) 60 mg capsule TAKE ONE CAPSULE BY MOUTH DAILY     No current facility-administered medications for this visit.       No Known Allergies  Past Medical History:   Diagnosis Date    Delivery normal     x2    Fibromyalgia     Headache(784.0)     Migraine headache     Multiple sclerosis (HCC)     Sarcoidosis     Sun-damaged skin     Tanning bed exposure      Past Surgical History:   Procedure Laterality Date    HX CHOLECYSTECTOMY  1990    HX GYN      tubal ligation    HX GYN      tubial     HX OTHER SURGICAL      tubal ligation    HX TUBAL LIGATION  1994    MT ABDOMEN SURGERY PROC UNLISTED      galbladder removal     Social History     Socioeconomic History    Marital status: SINGLE     Spouse name: Not on file    Number of children: Not on file    Years of education: Not on file    Highest education level: Not on file   Occupational History    Not on file   Social Needs    Financial resource strain: Not on file    Food insecurity     Worry: Not on file     Inability: Not on file    Transportation needs     Medical: Not on file     Non-medical: Not on file   Tobacco Use    Smoking status: Current Every Day Smoker     Packs/day: 1.00     Years: 30.00     Pack years: 30.00     Types: Cigarettes    Smokeless tobacco: Never Used   Substance and Sexual Activity    Alcohol use: No    Drug use: Yes     Types: Marijuana    Sexual activity: Yes     Partners: Male     Birth control/protection: Surgical   Lifestyle    Physical activity     Days per week: Not on file     Minutes per session: Not on file    Stress: Not on file   Relationships    Social connections     Talks on phone: Not on file     Gets together: Not on file     Attends Muslim service: Not on file     Active member of club or organization: Not on file     Attends meetings of clubs or organizations: Not on file     Relationship status: Not on file    Intimate partner violence     Fear of current or ex partner: Not on file     Emotionally abused: Not on file     Physically abused: Not on file     Forced sexual activity: Not on file   Other Topics Concern    Not on file   Social History Narrative    ** Merged History Encounter **          Family History   Problem Relation Age of Onset    Thyroid Disease Sister     Thyroid Disease Sister     Other Mother         B[place] rusty's disease 62    Heart Disease Father     Cancer Father         lymphoma       PHYSICAL EXAMINATION:    Visit Vitals  /82   Pulse 86   Temp 98.1 °F (36.7 °C) (Oral)   Resp 18   Ht 5' 6\" (1.676 m)   Wt 66.2 kg (146 lb)   SpO2 98%   BMI 23.57 kg/m²     General: Well defined, nourished, and groomed individual in no acute distress.    Neck: Supple, nontender, no bruits    Heart: Regular rate and rhythm    Lungs: Clear to auscultation bilaterally    Musculoskeletal: Extremities revealed no edema and had full range of motion of joints. Positive right Finkelstein   Psych: Good mood and bright affect    NEUROLOGICAL EXAMINATION:    Mental Status: Alert and oriented to person, place, and time with recent and remote memory intact.    Cranial Nerves:    II, III, IV, VI: Visual acuity grossly intact. Visual fields are normal.    Pupils are equal, round, and reactive to light and accommodation.    Extra-ocular movements are full and fluid. Fundoscopic exam was benign, no ptosis or nystagmus.    V-XII: Hearing is grossly intact. Facial features are symmetric, with normal sensation and strength. The palate rises symmetrically and the tongue protrudes midline. Sternocleidomastoids 5/5.    Motor Examination: Normal tone, bulk, very trace weakness noted in the left upper extremity with extension, left lower extremity weakness in the hip flexor and extension, decreased left dorsiflexion. Coordination: No resting or intention tremor    Gait and Station: Unsteady while walking today favoring the left leg. There is an antalgic component to this. No muscle wasting or fasiculations noted.    Reflexes: DTRs 2+ throughout. ASSESSMENT AND PLAN    ICD-10-CM ICD-9-CM    1. MS (multiple sclerosis) ([facility] [address]  G35 340 REFERRAL TO PHYSICAL THERAPY   2. Attention deficit disorder (ADD) without hyperactivity  F98.8 314.00 dextroamphetamine-amphetamine (ADDERALL) 10 mg tablet   3. Chronic pain syndrome  G89.4 338.4 gabapentin (NEURONTIN) 300 mg capsule      TOXASSURE SELECT 13 (MW)   4. Anxiety  F41.9 300.00 DULoxetine (CYMBALTA) 60 mg capsule   5. Restless leg syndrome  G25.81 333.94    6. Moderate anxiety  F41.9 300.00    7. Moderate major depression (HCC)  F32.1 296.22      Relapsing remitting multiple sclerosis which actually appears to be stable. Her last MRI was in January which was stable. Exam looks the same.   She does have some increased complaints of unsteadiness and weakness of the left leg though some of this may be related to being more active watching her grandkids and moving about on an uneven surface. We will set her up for physical therapy for gait, balance, and strengthening. Chronic pain issues related to underlying osteoarthritis, MS related hug, and neuropathy are well managed on Percocet in combination with the gabapentin, Cymbalta, and amitriptyline. She is due for a urine drug screen today and this was ordered. She will be set up with [facility] for her rems monitoring. Anxiety and depression are well managed on Cymbalta. Attention deficit and mild cognitive issues are manageable with Adderall and this is beneficial for her MS related fatigue as well. Follow-up in 3 months or sooner if needed. Myriam Avilez

## 2021-05-10 LAB
DRUGS UR: NORMAL
SPECIMEN STATUS REPORT, ROLRST: NORMAL

## 2021-05-11 DIAGNOSIS — G89.4 CHRONIC PAIN SYNDROME: ICD-10-CM

## 2021-05-11 RX ORDER — AMITRIPTYLINE HYDROCHLORIDE 100 MG/1
TABLET, FILM COATED ORAL
Qty: 30 TAB | Refills: 0 | Status: SHIPPED | OUTPATIENT
Start: 2021-05-11 | End: 2021-06-08

## 2021-05-12 ENCOUNTER — TELEPHONE (OUTPATIENT)
Dept: NEUROLOGY | Age: 56
End: 2021-05-12

## 2021-05-12 DIAGNOSIS — G89.4 CHRONIC PAIN SYNDROME: ICD-10-CM

## 2021-05-12 NOTE — TELEPHONE ENCOUNTER
----- Message from Burke Moritz sent at 5/12/2021  2:41 PM EDT ----- Regarding: NP, Marvin/Refill Medication Refill Caller (if not patient):  PT 
 
 
Relationship of caller (if not patient): 
 
 
Best contact number(s):  [phone] Name of medication and dosage if known:   \"Oxycodone 10mg\" Quantity 60 Is patient out of this medication (yes/no): No 
 
 
Pharmacy name:  [facility] Pharmacy listed in chart? (yes/no):  Yes Pharmacy phone number: [phone] Details to clarify the request:  Pt last seen on Wed, 05/05/21 Burke Moritz

## 2021-05-13 RX ORDER — OXYCODONE HYDROCHLORIDE 10 MG/1
10 TABLET ORAL
Qty: 60 TAB | Refills: 0 | Status: SHIPPED | OUTPATIENT
Start: 2021-05-13 | End: 2021-06-12

## 2021-06-15 DIAGNOSIS — G89.4 CHRONIC PAIN SYNDROME: Primary | ICD-10-CM

## 2021-06-15 NOTE — TELEPHONE ENCOUNTER
----- Message from Audie Almanza sent at 6/15/2021  2:02 PM EDT -----  Regarding: Gildardo Wong/Telephone  Medication Refill    Caller (if not patient):      Relationship of caller (if not patient):      Best contact number(s):[phone]      Name of medication and dosage if known: Roxicodone      Is patient out of this medication (yes/no): yes      Pharmacy name: [address] listed in chart? (yes/no): yes  Pharmacy phone number:      Details to clarify the request:      Audie Almanza

## 2021-06-21 RX ORDER — OXYCODONE HYDROCHLORIDE 10 MG/1
10 TABLET ORAL
Qty: 60 TABLET | Refills: 0 | Status: SHIPPED | OUTPATIENT
Start: 2021-06-21 | End: 2021-07-21 | Stop reason: SDUPTHER

## 2021-07-07 DIAGNOSIS — F98.8 ATTENTION DEFICIT DISORDER (ADD) WITHOUT HYPERACTIVITY: ICD-10-CM

## 2021-07-07 RX ORDER — DEXTROAMPHETAMINE SACCHARATE, AMPHETAMINE ASPARTATE, DEXTROAMPHETAMINE SULFATE AND AMPHETAMINE SULFATE 2.5; 2.5; 2.5; 2.5 MG/1; MG/1; MG/1; MG/1
10 TABLET ORAL 2 TIMES DAILY
Qty: 60 TABLET | Refills: 0 | Status: SHIPPED | OUTPATIENT
Start: 2021-07-07 | End: 2021-08-11 | Stop reason: SDUPTHER

## 2021-07-07 NOTE — TELEPHONE ENCOUNTER
----- Message from Clarissa Riojas sent at 7/7/2021 11:23 AM EDT -----  Regarding: Np Marvin/telephone  Medication Refill    Caller (if not patient):      Relationship of caller (if not patient):      Best contact number(s): [phone]      Name of medication and dosage if known:  Adderall      Is patient out of this medication (yes/no): yes      Pharmacy name:  [address] [place] [address] listed in chart? (yes/no): yes  Pharmacy phone number:      Details to clarify the request:      Clarissa Riojas

## 2021-07-20 DIAGNOSIS — G89.4 CHRONIC PAIN SYNDROME: ICD-10-CM

## 2021-07-20 RX ORDER — OXYCODONE HYDROCHLORIDE 10 MG/1
10 TABLET ORAL
Qty: 60 TABLET | Refills: 0 | Status: CANCELLED | OUTPATIENT
Start: 2021-07-20 | End: 2021-08-19

## 2021-07-20 NOTE — TELEPHONE ENCOUNTER
----- Message from Alonso Sykes sent at 7/20/2021  1:05 PM EDT -----  Regarding: NPMarvin/Refill  Medication Refill    Caller (if not patient):   PT      Relationship of caller (if not patient):      Best contact number(s):  [phone]      Name of medication and dosage if known:  \"OxyCodine IR (Roxicodon) 10mg Tablet\"      Is patient out of this medication (yes/no):  1 pill left      Pharmacy name:   [facility], [address] [place] [address] listed in chart? (yes/no):  Yes  Pharmacy phone number:   [phone]      Details to clarify the request:      Alonso Sykes

## 2021-07-21 RX ORDER — OXYCODONE HYDROCHLORIDE 10 MG/1
10 TABLET ORAL
Qty: 60 TABLET | Refills: 0 | Status: SHIPPED | OUTPATIENT
Start: 2021-07-21 | End: 2021-08-20

## 2021-08-11 DIAGNOSIS — F98.8 ATTENTION DEFICIT DISORDER (ADD) WITHOUT HYPERACTIVITY: ICD-10-CM

## 2021-08-11 NOTE — TELEPHONE ENCOUNTER
Caller (if not patient):Pt       Relationship of caller (if not patient): Self       Best contact number(s): [phone]       Name of medication and dosage if known: dextroamphetamine-amphetamine (ADDERALL) 10 mg tablet       Is patient out of this medication (yes/no): Yes       Pharmacy name: [address] listed in chart? (yes/no): Yes   Pharmacy phone [phone]       Details to clarify the request: Pt is requesting a refill of her Adderall. She is completely out.

## 2021-08-12 RX ORDER — DEXTROAMPHETAMINE SACCHARATE, AMPHETAMINE ASPARTATE, DEXTROAMPHETAMINE SULFATE AND AMPHETAMINE SULFATE 2.5; 2.5; 2.5; 2.5 MG/1; MG/1; MG/1; MG/1
10 TABLET ORAL 2 TIMES DAILY
Qty: 60 TABLET | Refills: 0 | Status: SHIPPED | OUTPATIENT
Start: 2021-08-12 | End: 2021-09-22 | Stop reason: SDUPTHER

## 2021-08-19 ENCOUNTER — TELEPHONE (OUTPATIENT)
Dept: NEUROLOGY | Age: 56
End: 2021-08-19

## 2021-08-19 DIAGNOSIS — G89.4 CHRONIC PAIN SYNDROME: ICD-10-CM

## 2021-08-19 DIAGNOSIS — F45.42 PAIN DISORDER ASSOCIATED WITH PSYCHOLOGICAL AND PHYSICAL FACTORS: ICD-10-CM

## 2021-08-19 DIAGNOSIS — G35 MS (MULTIPLE SCLEROSIS) (HCC): Primary | ICD-10-CM

## 2021-08-19 NOTE — TELEPHONE ENCOUNTER
----- Message from Hina Michelle sent at 8/19/2021  8:56 AM EDT -----  Regarding: BELLA Wong/telephone  Medication Refill    Caller (if not patient):      Relationship of caller (if not patient):      Best contact number(s):[phone]      Name of medication and dosage if known: Oxycodone      Is patient out of this medication (yes/no): Yes. Pharmacy name: [address] [place] [address] listed in chart? (yes/no):Yes.   Pharmacy phone number:

## 2021-08-23 RX ORDER — OXYCODONE HYDROCHLORIDE 10 MG/1
10 TABLET ORAL
Qty: 60 TABLET | Refills: 0 | Status: SHIPPED | OUTPATIENT
Start: 2021-08-23 | End: 2021-09-22

## 2021-09-22 DIAGNOSIS — G89.4 CHRONIC PAIN SYNDROME: ICD-10-CM

## 2021-09-22 DIAGNOSIS — G35 MS (MULTIPLE SCLEROSIS) (HCC): ICD-10-CM

## 2021-09-22 DIAGNOSIS — F45.42 PAIN DISORDER ASSOCIATED WITH PSYCHOLOGICAL AND PHYSICAL FACTORS: ICD-10-CM

## 2021-09-22 DIAGNOSIS — F98.8 ATTENTION DEFICIT DISORDER (ADD) WITHOUT HYPERACTIVITY: ICD-10-CM

## 2021-09-22 NOTE — TELEPHONE ENCOUNTER
Caller (if not patient):self       Relationship of caller (if not patient):self       Best contact number(s):[phone]       Name of medication and dosage if known:dextroamphetamine-amphetamine (ADDERALL) 10 mg tablet [[id number]]  oxyCODONE IR (ROXICODONE) 10 mg tab immediate release tablet [[id number]]         Is patient out of this medication (yes/no):yes       [address] listed in chart? (yes/no):yes   Pharmacy phone [phone]       Details to clarify the request:patient is out of both medications and in need of a refill

## 2021-09-30 RX ORDER — OXYCODONE HYDROCHLORIDE 10 MG/1
10 TABLET ORAL
Qty: 60 TABLET | Refills: 0 | OUTPATIENT
Start: 2021-09-30 | End: 2021-10-30

## 2021-09-30 RX ORDER — DEXTROAMPHETAMINE SACCHARATE, AMPHETAMINE ASPARTATE, DEXTROAMPHETAMINE SULFATE AND AMPHETAMINE SULFATE 2.5; 2.5; 2.5; 2.5 MG/1; MG/1; MG/1; MG/1
10 TABLET ORAL 2 TIMES DAILY
Qty: 60 TABLET | Refills: 0 | Status: SHIPPED | OUTPATIENT
Start: 2021-09-30 | End: 2022-06-09 | Stop reason: SDUPTHER

## 2021-09-30 NOTE — TELEPHONE ENCOUNTER
----- Message from Linda Nagy sent at 9/30/2021  8:57 AM EDT -----  Regarding: BELLA Elliott  General Message/Vendor Calls    Caller's first and last name:self       Reason for call:medication      Callback required yes/no and why:yes      Best contact number(s):[phone]      Details to clarify the request:patient is requesting a callback regarding her refills she has been calling since last week and they have yet to be filled and the patient is also stating that she rescheduled her appointment and that she missed her last one because of transportation and she wants a message left if she does not answer      Linda Nagy

## 2021-11-03 DIAGNOSIS — G89.4 CHRONIC PAIN SYNDROME: ICD-10-CM

## 2021-11-09 RX ORDER — AMITRIPTYLINE HYDROCHLORIDE 100 MG/1
TABLET, FILM COATED ORAL
Qty: 30 TABLET | Refills: 3 | Status: SHIPPED | OUTPATIENT
Start: 2021-11-09 | End: 2022-06-09 | Stop reason: SDUPTHER

## 2021-12-06 DIAGNOSIS — G89.4 CHRONIC PAIN SYNDROME: ICD-10-CM

## 2021-12-06 RX ORDER — GABAPENTIN 300 MG/1
CAPSULE ORAL
Qty: 1080 CAPSULE | Refills: 0 | Status: SHIPPED | OUTPATIENT
Start: 2021-12-06 | End: 2022-06-09 | Stop reason: SDUPTHER

## 2022-06-08 ENCOUNTER — TELEPHONE (OUTPATIENT)
Dept: NEUROLOGY | Age: 57
End: 2022-06-08

## 2022-06-08 NOTE — TELEPHONE ENCOUNTER
Patient ran out all of her medication and she stated her appointment was canceled with Bakari Vaughan and she is returning the nurses call.     Please call back [phone] Exp Problem Focused - Mod. Complex

## 2022-06-09 ENCOUNTER — TELEPHONE (OUTPATIENT)
Dept: NEUROLOGY | Age: 57
End: 2022-06-09

## 2022-06-09 DIAGNOSIS — F41.9 ANXIETY: ICD-10-CM

## 2022-06-09 DIAGNOSIS — G89.4 CHRONIC PAIN SYNDROME: ICD-10-CM

## 2022-06-09 DIAGNOSIS — F98.8 ATTENTION DEFICIT DISORDER (ADD) WITHOUT HYPERACTIVITY: ICD-10-CM

## 2022-06-09 RX ORDER — GABAPENTIN 300 MG/1
CAPSULE ORAL
Qty: 1080 CAPSULE | Refills: 0 | Status: SHIPPED
Start: 2022-06-09 | End: 2022-07-20 | Stop reason: DRUGHIGH

## 2022-06-09 RX ORDER — DULOXETIN HYDROCHLORIDE 60 MG/1
60 CAPSULE, DELAYED RELEASE ORAL DAILY
Qty: 90 CAPSULE | Refills: 0 | Status: SHIPPED | OUTPATIENT
Start: 2022-06-09 | End: 2022-07-20 | Stop reason: SDUPTHER

## 2022-06-09 RX ORDER — AMITRIPTYLINE HYDROCHLORIDE 100 MG/1
100 TABLET, FILM COATED ORAL
Qty: 30 TABLET | Refills: 3 | Status: SHIPPED | OUTPATIENT
Start: 2022-06-09 | End: 2022-07-20 | Stop reason: SDUPTHER

## 2022-06-09 RX ORDER — DEXTROAMPHETAMINE SACCHARATE, AMPHETAMINE ASPARTATE, DEXTROAMPHETAMINE SULFATE AND AMPHETAMINE SULFATE 2.5; 2.5; 2.5; 2.5 MG/1; MG/1; MG/1; MG/1
10 TABLET ORAL 2 TIMES DAILY
Qty: 60 TABLET | Refills: 0 | Status: SHIPPED | OUTPATIENT
Start: 2022-06-09 | End: 2022-07-20 | Stop reason: SDUPTHER

## 2022-07-20 ENCOUNTER — VIRTUAL VISIT (OUTPATIENT)
Dept: NEUROLOGY | Age: 57
End: 2022-07-20
Payer: MEDICAID

## 2022-07-20 DIAGNOSIS — F41.9 ANXIETY: ICD-10-CM

## 2022-07-20 DIAGNOSIS — F98.8 ATTENTION DEFICIT DISORDER (ADD) WITHOUT HYPERACTIVITY: ICD-10-CM

## 2022-07-20 DIAGNOSIS — G35 RELAPSING REMITTING MULTIPLE SCLEROSIS (HCC): Primary | ICD-10-CM

## 2022-07-20 DIAGNOSIS — G89.4 CHRONIC PAIN SYNDROME: ICD-10-CM

## 2022-07-20 PROCEDURE — 99214 OFFICE O/P EST MOD 30 MIN: CPT | Performed by: NURSE PRACTITIONER

## 2022-07-20 RX ORDER — DULOXETIN HYDROCHLORIDE 60 MG/1
60 CAPSULE, DELAYED RELEASE ORAL DAILY
Qty: 90 CAPSULE | Refills: 2 | Status: SHIPPED | OUTPATIENT
Start: 2022-07-20 | End: 2022-09-27 | Stop reason: SDUPTHER

## 2022-07-20 RX ORDER — AMITRIPTYLINE HYDROCHLORIDE 100 MG/1
100 TABLET, FILM COATED ORAL
Qty: 30 TABLET | Refills: 3 | Status: SHIPPED | OUTPATIENT
Start: 2022-07-20 | End: 2022-08-22 | Stop reason: SDUPTHER

## 2022-07-20 RX ORDER — GABAPENTIN 600 MG/1
1200 TABLET ORAL 3 TIMES DAILY
Qty: 180 TABLET | Refills: 3 | Status: SHIPPED | OUTPATIENT
Start: 2022-07-20 | End: 2022-09-27 | Stop reason: SDUPTHER

## 2022-07-20 RX ORDER — DEXTROAMPHETAMINE SACCHARATE, AMPHETAMINE ASPARTATE, DEXTROAMPHETAMINE SULFATE AND AMPHETAMINE SULFATE 2.5; 2.5; 2.5; 2.5 MG/1; MG/1; MG/1; MG/1
10 TABLET ORAL 2 TIMES DAILY
Qty: 60 TABLET | Refills: 0 | Status: SHIPPED | OUTPATIENT
Start: 2022-07-20 | End: 2022-08-22 | Stop reason: SDUPTHER

## 2022-07-20 NOTE — PROGRESS NOTES
[address] [facility],5Th Floor  [address]   [place] [address] [facility]   [phone] Office   [phone] Fax           Date:  22     Name:  Darvin Wilson  :  1965  MRN:  [id number]     PCP:  None    Stanley Pamela is a 65 y.o. female who was seen by synchronous (real-time) audio-video technology on 2022 for Multiple Sclerosis    Subjective:   RRMS. Thinks she might have had a small exacerbation in May. She had some blurred vision in the left eye, increased pain in the left side, and burning sensations, for about a week. Otherwise, she has been about the same. She still has the left leg weakness, gait disturbance and balance issues, MS hug. Anxiety and depression were well managed on Cymbalta but she stopped it on her own for a couple of months. She has resumed this in the last couple of months. Attention deficit and mild cognitive issues are manageable with Adderall and this is beneficial for her MS related fatigue as well. These are all baseline symptoms. She is trying to do some exercise, Yoga 10- minutes a day. She is sleeping better. Recap from LOV:  Relapsing remitting multiple sclerosis which actually appears to be stable. Her last MRI was in January which was stable. Exam looks the same. She does have some increased complaints of unsteadiness and weakness of the left leg though some of this may be related to being more active watching her grandkids and moving about on an uneven surface. We will set her up for physical therapy for gait, balance, and strengthening. Chronic pain issues related to underlying osteoarthritis, MS related hug, and neuropathy are well managed on Percocet in combination with the gabapentin, Cymbalta, and amitriptyline. She is due for a urine drug screen today and this was ordered. She will be set up with [facility] for her rems monitoring.   Anxiety and depression are well managed on Cymbalta. Attention deficit and mild cognitive issues are manageable with Adderall and this is beneficial for her MS related fatigue as well. Follow-up in 3 months or sooner if needed. Current Outpatient Medications   Medication Sig    amitriptyline (ELAVIL) 100 mg tablet Take 1 Tablet by mouth nightly. dextroamphetamine-amphetamine (ADDERALL) 10 mg tablet Take 1 Tablet by mouth two (2) times a day. Max Daily Amount: 20 mg. DULoxetine (CYMBALTA) 60 mg capsule Take 1 Capsule by mouth daily. gabapentin (NEURONTIN) 300 mg capsule TAKE FOUR CAPSULES BY MOUTH THREE TIMES A DAY (MAX DAILY AMOUNT: 3600 MG)     No current facility-administered medications for this visit. No Known Allergies   Past Medical History:   Diagnosis Date    Delivery normal     x2    Fibromyalgia     Headache(784.0)     Migraine headache     Multiple sclerosis ([address]     Sarcoidosis     Sun-damaged skin     Tanning bed exposure      Past Surgical History:   Procedure Laterality Date    HX CHOLECYSTECTOMY  1990    HX GYN      tubal ligation    HX GYN      tubial     HX OTHER SURGICAL      tubal ligation    HX TUBAL LIGATION  1994    CO ABDOMEN SURGERY PROC UNLISTED      galbladder removal      reports that she has been smoking cigarettes. She has a 30.00 pack-year smoking history. She has never used smokeless tobacco. She reports current drug use. Drug: Marijuana. She reports that she does not drink alcohol.  family history includes Cancer in her father; Heart Disease in her father; Other in her mother; Thyroid Disease in her sister and sister. ROS    Objective:   No flowsheet data found. General:  Well defined, nourished, and groomed individual in no acute distress. Psych:  Good mood and bright affect    NEUROLOGICAL EXAMINATION:     Mental Status:   Alert and oriented to person, place, and time with recent and remote memory intact.   Attention span and concentration are normal. Speech is fluent with a full fund of knowledge. Cranial Nerves:  I: smell Not tested   II: visual fields Not assessed   II: pupils Equal, round, reactive to light   II: optic disc Not assessed   III,VII: ptosis none   III,IV,VI: extraocular muscles  Full ROM   V: mastication normal   V: facial light touch sensation  Not assessed   VII: facial muscle function   symmetric   VIII: hearing symmetric   IX: soft palate elevation  normal   XI: trapezius strength  Not assessed   XI: sternocleidomastoid strength Not assessed   XI: neck flexion strength  Not assessed   XII: tongue  midline     Motor Examination: Normal tone and bulk. Strength was not assessed      Sensory exam:  Not assessed     Coordination:  No resting or intention tremor    Gait and Station:  Steady while walking. No muscle wasting or fasiculations noted. Reflexes:  Not assessed    Assessment & Plan:       ICD-10-CM ICD-9-CM    1. Relapsing remitting multiple sclerosis (HCC)  G35 340 MRI BRAIN WO CONT      2. Chronic pain syndrome  G89.4 338.4 gabapentin (NEURONTIN) 600 mg tablet      amitriptyline (ELAVIL) 100 mg tablet      3. Attention deficit disorder (ADD) without hyperactivity  F98.8 314.00 dextroamphetamine-amphetamine (ADDERALL) 10 mg tablet      4. Anxiety  F41.9 300.00 DULoxetine (CYMBALTA) 60 mg capsule        RRMS which was treated with Chela Vaughn. She has now graduated from the REMS program. Last infusion was in September 2017. Overall, she has done well. She still has her usual baseline symptoms which are not changed. She may have had some increase in symptom severity for about a week which could have been an exacerbation though this resolved without treatment and she went back to her baseline. She is managing in terms of pain on gabapentin, Elavil, and Cymbalta. She may be a little worse in terms of depression and anxiety but did stop the Cymbalta for a while and has only recently been back on this.  Due to cost, she has also been a little bit more sporadic in terms of her medications in general. Will ensure that she is back on her regimen fully and then reassess. She is due for serial imaging which was ordered today. Follow up in three months      We discussed the expected course, resolution and complications of the diagnosis(es) in detail. Medication risks, benefits, costs, interactions, and alternatives were discussed as indicated. I advised her to contact the office if her condition worsens, changes or fails to improve as anticipated. She expressed understanding with the diagnosis(es) and plan. Silvia Edmond, was evaluated through a synchronous (real-time) audio-video encounter. The patient (or guardian if applicable) is aware that this is a billable service, which includes applicable co-pays. This Virtual Visit was conducted with patient's (and/or legal guardian's) consent. The visit was conducted pursuant to the emergency declaration under the [facility], [address] [place] [address] [facility] authority and the [employer] and [employer] General Act. Patient identification was verified, and a caregiver was present when appropriate.   The patient was located at: Home: [address] [place] [address]  The provider was located at: Home: [address] [facility]

## 2022-08-22 DIAGNOSIS — G89.4 CHRONIC PAIN SYNDROME: ICD-10-CM

## 2022-08-22 DIAGNOSIS — F98.8 ATTENTION DEFICIT DISORDER (ADD) WITHOUT HYPERACTIVITY: ICD-10-CM

## 2022-08-22 NOTE — TELEPHONE ENCOUNTER
Patient requesting refills on:    Requested Prescriptions     Pending Prescriptions Disp Refills    dextroamphetamine-amphetamine (ADDERALL) 10 mg tablet 60 Tablet 0     Sig: Take 1 Tablet by mouth two (2) times a day. Max Daily Amount: 20 mg.    amitriptyline (ELAVIL) 100 mg tablet 30 Tablet 3     Sig: Take 1 Tablet by mouth nightly.

## 2022-08-29 RX ORDER — DEXTROAMPHETAMINE SACCHARATE, AMPHETAMINE ASPARTATE, DEXTROAMPHETAMINE SULFATE AND AMPHETAMINE SULFATE 2.5; 2.5; 2.5; 2.5 MG/1; MG/1; MG/1; MG/1
10 TABLET ORAL 2 TIMES DAILY
Qty: 60 TABLET | Refills: 0 | Status: SHIPPED | OUTPATIENT
Start: 2022-08-29 | End: 2022-09-27 | Stop reason: SDUPTHER

## 2022-08-29 RX ORDER — AMITRIPTYLINE HYDROCHLORIDE 100 MG/1
100 TABLET, FILM COATED ORAL
Qty: 30 TABLET | Refills: 3 | Status: SHIPPED | OUTPATIENT
Start: 2022-08-29 | End: 2022-09-27 | Stop reason: SDUPTHER

## 2022-09-27 DIAGNOSIS — G89.4 CHRONIC PAIN SYNDROME: ICD-10-CM

## 2022-09-27 DIAGNOSIS — F98.8 ATTENTION DEFICIT DISORDER (ADD) WITHOUT HYPERACTIVITY: ICD-10-CM

## 2022-09-27 DIAGNOSIS — F41.9 ANXIETY: ICD-10-CM

## 2022-09-27 RX ORDER — DEXTROAMPHETAMINE SACCHARATE, AMPHETAMINE ASPARTATE, DEXTROAMPHETAMINE SULFATE AND AMPHETAMINE SULFATE 2.5; 2.5; 2.5; 2.5 MG/1; MG/1; MG/1; MG/1
10 TABLET ORAL 2 TIMES DAILY
Qty: 60 TABLET | Refills: 0 | Status: SHIPPED | OUTPATIENT
Start: 2022-09-27 | End: 2022-10-18 | Stop reason: SDUPTHER

## 2022-09-27 RX ORDER — DULOXETIN HYDROCHLORIDE 60 MG/1
60 CAPSULE, DELAYED RELEASE ORAL DAILY
Qty: 90 CAPSULE | Refills: 2 | Status: SHIPPED | OUTPATIENT
Start: 2022-09-27 | End: 2022-10-18 | Stop reason: SDUPTHER

## 2022-09-27 RX ORDER — GABAPENTIN 600 MG/1
1200 TABLET ORAL 3 TIMES DAILY
Qty: 180 TABLET | Refills: 3 | Status: SHIPPED | OUTPATIENT
Start: 2022-09-27 | End: 2022-10-18 | Stop reason: SDUPTHER

## 2022-09-27 RX ORDER — AMITRIPTYLINE HYDROCHLORIDE 100 MG/1
100 TABLET, FILM COATED ORAL
Qty: 30 TABLET | Refills: 3 | Status: SHIPPED | OUTPATIENT
Start: 2022-09-27 | End: 2022-10-18 | Stop reason: SDUPTHER

## 2022-09-27 NOTE — TELEPHONE ENCOUNTER
Requesting refills on:    Requested Prescriptions     Pending Prescriptions Disp Refills    dextroamphetamine-amphetamine (ADDERALL) 10 mg tablet 60 Tablet 0     Sig: Take 1 Tablet by mouth two (2) times a day. Max Daily Amount: 20 mg.    gabapentin (NEURONTIN) 600 mg tablet 180 Tablet 3     Sig: Take 2 Tablets by mouth three (3) times daily. Max Daily Amount: 3,600 mg.    amitriptyline (ELAVIL) 100 mg tablet 30 Tablet 3     Sig: Take 1 Tablet by mouth nightly. DULoxetine (CYMBALTA) 60 mg capsule 90 Capsule 2     Sig: Take 1 Capsule by mouth daily. Patient also stated currently in a flare up and it's affecting her eyes and left side of her body. She also wants to reinstate her pain medication. Please call.

## 2022-10-18 ENCOUNTER — VIRTUAL VISIT (OUTPATIENT)
Dept: NEUROLOGY | Age: 57
End: 2022-10-18
Payer: MEDICAID

## 2022-10-18 DIAGNOSIS — F98.8 ATTENTION DEFICIT DISORDER (ADD) WITHOUT HYPERACTIVITY: ICD-10-CM

## 2022-10-18 DIAGNOSIS — G89.4 CHRONIC PAIN SYNDROME: ICD-10-CM

## 2022-10-18 DIAGNOSIS — F41.9 ANXIETY: ICD-10-CM

## 2022-10-18 PROCEDURE — 99214 OFFICE O/P EST MOD 30 MIN: CPT | Performed by: NURSE PRACTITIONER

## 2022-10-18 RX ORDER — GABAPENTIN 600 MG/1
1200 TABLET ORAL 3 TIMES DAILY
Qty: 180 TABLET | Refills: 3 | Status: SHIPPED | OUTPATIENT
Start: 2022-10-18

## 2022-10-18 RX ORDER — DEXTROAMPHETAMINE SACCHARATE, AMPHETAMINE ASPARTATE, DEXTROAMPHETAMINE SULFATE AND AMPHETAMINE SULFATE 2.5; 2.5; 2.5; 2.5 MG/1; MG/1; MG/1; MG/1
10 TABLET ORAL 2 TIMES DAILY
Qty: 60 TABLET | Refills: 0 | Status: SHIPPED | OUTPATIENT
Start: 2022-10-18

## 2022-10-18 RX ORDER — DULOXETIN HYDROCHLORIDE 60 MG/1
60 CAPSULE, DELAYED RELEASE ORAL DAILY
Qty: 90 CAPSULE | Refills: 3 | Status: SHIPPED | OUTPATIENT
Start: 2022-10-18

## 2022-10-18 RX ORDER — AMITRIPTYLINE HYDROCHLORIDE 100 MG/1
100 TABLET, FILM COATED ORAL
Qty: 30 TABLET | Refills: 3 | Status: SHIPPED | OUTPATIENT
Start: 2022-10-18

## 2022-10-18 NOTE — PROGRESS NOTES
[address] [facility],5[address] Floor  [address]   [address] [place] [address] [place] [address] [address]   [phone] Office   [phone] Fax           Date:  10/18/22     Name:  Natalie Aguirre  :  1965  MRN:  [id number]     PCP:  None    Dusty Cruzito is a 65 y.o. female who was seen by synchronous (real-time) audio-video technology on 10/18/2022 for Multiple Sclerosis    Subjective:   RRMS. At baseline, she has some left leg weakness, gait disturbance and balance issues, MS hug. Anxiety and depression were well managed on Cymbalta now that she is taking this consistently. Attention deficit and mild cognitive issues are manageable with Adderall and this is beneficial for her MS related fatigue as well. Recap from LOV:  RRMS which was treated with Lemtrada. She has now graduated from the REMS program. Last infusion was in 2017. Overall, she has done well. She still has her usual baseline symptoms which are not changed. She may have had some increase in symptom severity for about a week which could have been an exacerbation though this resolved without treatment and she went back to her baseline. She is managing in terms of pain on gabapentin, Elavil, and Cymbalta. She may be a little worse in terms of depression and anxiety but did stop the Cymbalta for a while and has only recently been back on this. Due to cost, she has also been a little bit more sporadic in terms of her medications in general. Will ensure that she is back on her regimen fully and then reassess. She is due for serial imaging which was ordered today. Follow up in three months    Current Outpatient Medications   Medication Sig    dextroamphetamine-amphetamine (ADDERALL) 10 mg tablet Take 1 Tablet by mouth two (2) times a day. Max Daily Amount: 20 mg.    gabapentin (NEURONTIN) 600 mg tablet Take 2 Tablets by mouth three (3) times daily. Max Daily Amount: 3,600 mg. amitriptyline (ELAVIL) 100 mg tablet Take 1 Tablet by mouth nightly. DULoxetine (CYMBALTA) 60 mg capsule Take 1 Capsule by mouth daily. No current facility-administered medications for this visit. No Known Allergies   Past Medical History:   Diagnosis Date    Delivery normal     x2    Fibromyalgia     Headache(784.0)     Migraine headache     Multiple sclerosis ([facility] [address]     Sarcoidosis     Sun-damaged skin     Tanning bed exposure      Past Surgical History:   Procedure Laterality Date    HX CHOLECYSTECTOMY  1990    HX GYN      tubal ligation    HX GYN      tubial     HX OTHER SURGICAL      tubal ligation    HX TUBAL LIGATION  1994    [place] ABDOMEN SURGERY PROC UNLISTED      galbladder removal      reports that she has been smoking cigarettes. She has a 30.00 pack-year smoking history. She has never used smokeless tobacco. She reports current drug use. Drug: Marijuana. She reports that she does not drink alcohol.  family history includes Cancer in her father; Heart Disease in her father; Other in her mother; Thyroid Disease in her sister and sister. ROS    Objective:   No flowsheet data found. General:  Well defined, nourished, and groomed individual in no acute distress. Psych:  Good mood and bright affect    NEUROLOGICAL EXAMINATION:     Mental Status:   Alert and oriented to person, place, and time with recent and remote memory intact. Attention span and concentration are normal. Speech is fluent with a full fund of knowledge.       Cranial Nerves:  I: smell Not tested   II: visual fields Not assessed   II: pupils Equal, round, reactive to light   II: optic disc Not assessed   III,VII: ptosis none   III,IV,VI: extraocular muscles  Full ROM   V: mastication normal   V: facial light touch sensation  Not assessed   VII: facial muscle function   symmetric   VIII: hearing symmetric   IX: soft palate elevation  normal   XI: trapezius strength  Not assessed   XI: sternocleidomastoid strength Not assessed XI: neck flexion strength  Not assessed   XII: tongue  midline     Motor Examination: Normal tone and bulk. Strength was not assessed      Sensory exam:  Not assessed     Coordination:  No resting or intention tremor    Gait and Station:  Steady while walking. No muscle wasting or fasiculations noted. Reflexes:  Not assessed    Assessment & Plan:       ICD-10-CM ICD-9-CM    1. Attention deficit disorder (ADD) without hyperactivity  F98.8 314.00 dextroamphetamine-amphetamine (ADDERALL) 10 mg tablet      2. Chronic pain syndrome  G89.4 338.4 gabapentin (NEURONTIN) 600 mg tablet      amitriptyline (ELAVIL) 100 mg tablet      3. Anxiety  F41.9 300.00 DULoxetine (CYMBALTA) 60 mg capsule        RRMS which was treated with Kari Perez. She has now graduated from the REMS program. Last infusion was in September 2017. Overall, she has done well. She still has her usual baseline symptoms which are not changed. She is managing in terms of pain on gabapentin, Elavil, and Cymbalta. Anxiety and depression are better overall now that she is taking her medication more consistently. She did have some questions regarding a medical marijuana certificate. As this is something, I do not do, she was given information for [facility] in [place]. She is due for serial imaging which was ordered at her last visit but she has not done this and she was given the number to call to have this scheduled. Follow up in three months      We discussed the expected course, resolution and complications of the diagnosis(es) in detail. Medication risks, benefits, costs, interactions, and alternatives were discussed as indicated. I advised her to contact the office if her condition worsens, changes or fails to improve as anticipated. She expressed understanding with the diagnosis(es) and plan. Preet Lizama, was evaluated through a synchronous (real-time) audio-video encounter.  The patient (or guardian if applicable) is aware that this is a billable service, which includes applicable co-pays. This Virtual Visit was conducted with patient's (and/or legal guardian's) consent. The visit was conducted pursuant to the emergency declaration under the [facility], [address] [place] [address] authority and the [employer] and [employer] General Act. Patient identification was verified, and a caregiver was present when appropriate.   The patient was located at: Home: [address] [address]  The provider was located at: Home: [address] [facility]

## 2022-11-09 ENCOUNTER — TELEPHONE (OUTPATIENT)
Dept: NEUROLOGY | Age: 57
End: 2022-11-09

## 2022-11-09 DIAGNOSIS — F98.8 ATTENTION DEFICIT DISORDER (ADD) WITHOUT HYPERACTIVITY: ICD-10-CM

## 2022-11-09 NOTE — TELEPHONE ENCOUNTER
Patient called needing a refill on her medication ADDERALL. The pharmacy informed her the doctor or nurse has to call it in. Patient is completely out.     Patient would like to know if Jorgegrayson Konrad can prescribe something for her MS pain due to it increasing with the cold weather    Please contact

## 2022-11-22 RX ORDER — DEXTROAMPHETAMINE SACCHARATE, AMPHETAMINE ASPARTATE, DEXTROAMPHETAMINE SULFATE AND AMPHETAMINE SULFATE 2.5; 2.5; 2.5; 2.5 MG/1; MG/1; MG/1; MG/1
10 TABLET ORAL 2 TIMES DAILY
Qty: 60 TABLET | Refills: 0 | OUTPATIENT
Start: 2022-11-22

## 2022-11-22 NOTE — TELEPHONE ENCOUNTER
Called patient to let her know about the medication and needing to see a DrAnne In office.  Patient is scheduled to see dr. Judah Bentley

## 2023-01-17 ENCOUNTER — VIRTUAL VISIT (OUTPATIENT)
Dept: NEUROLOGY | Age: 58
End: 2023-01-17
Payer: MEDICAID

## 2023-01-17 DIAGNOSIS — G25.81 RESTLESS LEG SYNDROME: ICD-10-CM

## 2023-01-17 DIAGNOSIS — F98.8 ATTENTION DEFICIT DISORDER (ADD) WITHOUT HYPERACTIVITY: ICD-10-CM

## 2023-01-17 DIAGNOSIS — G35 RELAPSING REMITTING MULTIPLE SCLEROSIS (HCC): Primary | ICD-10-CM

## 2023-01-17 DIAGNOSIS — F41.9 ANXIETY: ICD-10-CM

## 2023-01-17 DIAGNOSIS — F41.9 MODERATE ANXIETY: ICD-10-CM

## 2023-01-17 DIAGNOSIS — F32.1 MODERATE MAJOR DEPRESSION (HCC): ICD-10-CM

## 2023-01-17 DIAGNOSIS — F45.42 PAIN DISORDER ASSOCIATED WITH PSYCHOLOGICAL AND PHYSICAL FACTORS: ICD-10-CM

## 2023-01-17 PROCEDURE — 99214 OFFICE O/P EST MOD 30 MIN: CPT | Performed by: NURSE PRACTITIONER

## 2023-01-17 RX ORDER — DEXTROAMPHETAMINE SACCHARATE, AMPHETAMINE ASPARTATE, DEXTROAMPHETAMINE SULFATE AND AMPHETAMINE SULFATE 2.5; 2.5; 2.5; 2.5 MG/1; MG/1; MG/1; MG/1
10 TABLET ORAL 2 TIMES DAILY
Qty: 60 TABLET | Refills: 0 | Status: SHIPPED | OUTPATIENT
Start: 2023-01-17

## 2023-01-17 NOTE — PROGRESS NOTES
[address] [facility],5Th [address]  [address]   [place] [address] [facility]    [phone] Office   [phone] Fax           Date:  23     Name:  Keyona Vance  :  1965  MRN:  [id number]     PCP:  None    Christian Lee is a 65 y.o. female who was seen by synchronous (real-time) audio-video technology on 2023 for Multiple Sclerosis    Subjective:   RRMS. Serial imaging was ordered but she has not had this yet. She had to reschedule it due to having the flu. She states that she is doing okay. She feels very anxious especially at night. She is so wound up, she is having some issues sleeping. She also feels very scattered but states that she has been having some issues getting the Adderall. She was told by her pharmacy there was a nationwide shortage of this medication. She has also had a little more pain with the temperature changes. She has tried to take some of the gummies but this really does not help. At baseline, she has some left leg weakness, gait disturbance and balance issues, MS hug. Anxiety and depression is better with the Cymbalta. Attention deficit and mild cognitive issues are manageable with Adderall and this is beneficial for her MS related fatigue as well. Recap from LOV:  RRMS which was treated with Lemtrada. She has now graduated from the [facility] program. Last infusion was in 2017. Overall, she has done well. She still has her usual baseline symptoms which are not changed. She is managing in terms of pain on gabapentin, Elavil, and Cymbalta. Anxiety and depression are better overall now that she is taking her medication more consistently. She did have some questions regarding a medical marijuana certificate. As this is something, I do not do, she was given information for [facility] clinic in [place].  She is due for serial imaging which was ordered at her last visit but she has not done this and she was given the number to call to have this scheduled. Follow up in three months    Current Outpatient Medications   Medication Sig    dextroamphetamine-amphetamine (ADDERALL) 10 mg tablet Take 1 Tablet by mouth two (2) times a day. Max Daily Amount: 20 mg.    gabapentin (NEURONTIN) 600 mg tablet Take 2 Tablets by mouth three (3) times daily. Max Daily Amount: 3,600 mg.    amitriptyline (ELAVIL) 100 mg tablet Take 1 Tablet by mouth nightly. DULoxetine (CYMBALTA) 60 mg capsule Take 1 Capsule by mouth daily. No current facility-administered medications for this visit. No Known Allergies   Past Medical History:   Diagnosis Date    Delivery normal     x2    Fibromyalgia     Headache(784.0)     Migraine headache     Multiple sclerosis ([facility] [address]     Sarcoidosis     Sun-damaged skin     Tanning bed exposure      Past Surgical History:   Procedure Laterality Date    HX CHOLECYSTECTOMY  1990    HX GYN      tubal ligation    HX GYN      tubial     HX OTHER SURGICAL      tubal ligation    HX TUBAL LIGATION  1994    FL ABDOMEN SURGERY PROC UNLISTED      galbladder removal      reports that she has been smoking cigarettes. She has a 30.00 pack-year smoking history. She has never used smokeless tobacco. She reports current drug use. Drug: Marijuana. She reports that she does not drink alcohol.  family history includes Cancer in her father; Heart Disease in her father; Other in her mother; Thyroid Disease in her sister and sister. ROS    Objective:   No flowsheet data found. General:  Well defined, nourished, and groomed individual in no acute distress. Psych:  Good mood and bright affect    NEUROLOGICAL EXAMINATION:     Mental Status:   Alert and oriented to person, place, and time with recent and remote memory intact. Attention span and concentration are normal. Speech is fluent with a full fund of knowledge.       Cranial Nerves:  I: smell Not tested   II: visual fields Not assessed   II: pupils Equal, round, reactive to light   II: optic disc Not assessed   III,VII: ptosis none   III,IV,VI: extraocular muscles  Full ROM   V: mastication normal   V: facial light touch sensation  Not assessed   VII: facial muscle function   symmetric   VIII: hearing symmetric   IX: soft palate elevation  normal   XI: trapezius strength  Not assessed   XI: sternocleidomastoid strength Not assessed   XI: neck flexion strength  Not assessed   XII: tongue  midline     Motor Examination: Normal tone and bulk. Strength was not assessed      Sensory exam:  Not assessed     Coordination:  No resting or intention tremor    Gait and Station:  Steady while walking. No muscle wasting or fasiculations noted. Reflexes:  Not assessed    Assessment & Plan:       ICD-10-CM ICD-9-CM    1. Relapsing remitting multiple sclerosis ([facility] [address]  G35 340       2. Attention deficit disorder (ADD) without hyperactivity  F98.8 314.00 dextroamphetamine-amphetamine (ADDERALL) 10 mg tablet      3. Anxiety  F41.9 300.00       4. Pain disorder associated with psychological and physical factors  F45.42 307.89       5. Restless leg syndrome  G25.81 333.94       6. Moderate anxiety  F41.9 300.00       7. Moderate major depression (HCC)  F32.1 296.22         RRMS which was treated with Mohan Dewayne. She has now graduated from the [facility]S program. Last infusion was in September 2017. Overall, she has done well. She still has her usual baseline symptoms which are not changed. She is managing in terms of pain on gabapentin, Elavil, and Cymbalta. Anxiety and depression are better but she is having some increased anxiety due to financial issues, food insecurity, and issues with heat and hot water at the house. She still has not been approved for disability so she does not have any personal income and her daughter is now working full time so she is home with the grandchildren by herself.  This is all situational. She does better when she has the Adderall in terms of the fatigue and attention deficit. Will try to send this to another pharmacy. She is due for serial imaging which she needs to reschedule. Follow up in three months    We discussed the expected course, resolution and complications of the diagnosis(es) in detail. Medication risks, benefits, costs, interactions, and alternatives were discussed as indicated. I advised her to contact the office if her condition worsens, changes or fails to improve as anticipated. She expressed understanding with the diagnosis(es) and plan. Fern Giron, was evaluated through a synchronous (real-time) audio-video encounter. The patient (or guardian if applicable) is aware that this is a billable service, which includes applicable co-pays. This Virtual Visit was conducted with patient's (and/or legal guardian's) consent. The visit was conducted pursuant to the emergency declaration under the [facility], [address] [place] [address] wa[facility] authority and the [employer] General Act. Patient identification was verified, and a caregiver was present when appropriate.   The patient was located at: Home: [facility] [id number]  The provider was located at: Home: [address] [place] [address],

## 2023-03-16 ENCOUNTER — TELEPHONE (OUTPATIENT)
Dept: NEUROLOGY | Age: 58
End: 2023-03-16

## 2023-03-16 NOTE — TELEPHONE ENCOUNTER
Patient called and stated she needs a refill on her medication ADDERALL. The pharmacy informed the patient the doctor needs to call it in.     Please contact

## 2023-03-20 DIAGNOSIS — F98.8 ATTENTION DEFICIT DISORDER (ADD) WITHOUT HYPERACTIVITY: ICD-10-CM

## 2023-03-20 RX ORDER — DEXTROAMPHETAMINE SACCHARATE, AMPHETAMINE ASPARTATE, DEXTROAMPHETAMINE SULFATE AND AMPHETAMINE SULFATE 2.5; 2.5; 2.5; 2.5 MG/1; MG/1; MG/1; MG/1
10 TABLET ORAL 2 TIMES DAILY
Qty: 60 TABLET | Refills: 0 | Status: SHIPPED | OUTPATIENT
Start: 2023-03-20

## 2023-03-27 ENCOUNTER — TELEPHONE (OUTPATIENT)
Dept: NEUROLOGY | Age: 58
End: 2023-03-27

## 2023-04-17 NOTE — TELEPHONE ENCOUNTER
Pt wanted to ask Marlena Dale to order another MRI. She says she's extremely sorry that she hasn't gotten it completed.  She also wanted to make sure her medications are being sent to [facility]

## 2023-05-10 ENCOUNTER — TELEPHONE (OUTPATIENT)
Age: 58
End: 2023-05-10

## 2023-05-10 DIAGNOSIS — R41.840 ATTENTION AND CONCENTRATION DEFICIT: Primary | ICD-10-CM

## 2023-05-12 ENCOUNTER — TELEPHONE (OUTPATIENT)
Age: 58
End: 2023-05-12

## 2023-05-15 RX ORDER — DEXTROAMPHETAMINE SACCHARATE, AMPHETAMINE ASPARTATE, DEXTROAMPHETAMINE SULFATE AND AMPHETAMINE SULFATE 2.5; 2.5; 2.5; 2.5 MG/1; MG/1; MG/1; MG/1
10 TABLET ORAL 2 TIMES DAILY
Qty: 60 TABLET | Refills: 0 | Status: SHIPPED | OUTPATIENT
Start: 2023-05-15 | End: 2023-05-19 | Stop reason: SDUPTHER

## 2023-05-17 ENCOUNTER — TELEPHONE (OUTPATIENT)
Age: 58
End: 2023-05-17

## 2023-05-17 DIAGNOSIS — R41.840 ATTENTION AND CONCENTRATION DEFICIT: ICD-10-CM

## 2023-05-17 DIAGNOSIS — G35 MULTIPLE SCLEROSIS (HCC): Primary | ICD-10-CM

## 2023-05-17 DIAGNOSIS — G25.81 RESTLESS LEGS SYNDROME: ICD-10-CM

## 2023-05-17 DIAGNOSIS — F32.1 MAJOR DEPRESSIVE DISORDER, SINGLE EPISODE, MODERATE (HCC): ICD-10-CM

## 2023-05-17 DIAGNOSIS — F41.1 GENERALIZED ANXIETY DISORDER: ICD-10-CM

## 2023-05-17 NOTE — TELEPHONE ENCOUNTER
Called pt to cancel appt for today with Beatriz Case due to her being sick. Pt says she is out of medication and has been reaching out for a month trying to get refills. Pt states she has had this appt rescheduled by us a lot and needs to be seen.  Please call pt to disucss

## 2023-05-19 RX ORDER — AMITRIPTYLINE HYDROCHLORIDE 100 MG/1
100 TABLET, FILM COATED ORAL NIGHTLY
Qty: 30 TABLET | Refills: 3 | Status: SHIPPED | OUTPATIENT
Start: 2023-05-19

## 2023-05-19 RX ORDER — DULOXETIN HYDROCHLORIDE 60 MG/1
60 CAPSULE, DELAYED RELEASE ORAL DAILY
Qty: 30 CAPSULE | Refills: 3 | Status: SHIPPED | OUTPATIENT
Start: 2023-05-19

## 2023-05-19 RX ORDER — GABAPENTIN 600 MG/1
1200 TABLET ORAL 3 TIMES DAILY
Qty: 180 TABLET | Refills: 2 | Status: SHIPPED | OUTPATIENT
Start: 2023-05-19 | End: 2023-08-17

## 2023-05-19 RX ORDER — DEXTROAMPHETAMINE SACCHARATE, AMPHETAMINE ASPARTATE, DEXTROAMPHETAMINE SULFATE AND AMPHETAMINE SULFATE 2.5; 2.5; 2.5; 2.5 MG/1; MG/1; MG/1; MG/1
10 TABLET ORAL 2 TIMES DAILY
Qty: 60 TABLET | Refills: 0 | Status: SHIPPED | OUTPATIENT
Start: 2023-05-19 | End: 2023-06-18

## 2023-05-23 ENCOUNTER — TELEPHONE (OUTPATIENT)
Age: 58
End: 2023-05-23

## 2023-05-24 DIAGNOSIS — G35 MULTIPLE SCLEROSIS (HCC): Primary | ICD-10-CM

## 2023-06-02 ENCOUNTER — HOSPITAL ENCOUNTER (OUTPATIENT)
Facility: HOSPITAL | Age: 58
End: 2023-06-02
Payer: MEDICAID

## 2023-06-02 DIAGNOSIS — G35 MULTIPLE SCLEROSIS (HCC): ICD-10-CM

## 2023-06-02 PROCEDURE — 70553 MRI BRAIN STEM W/O & W/DYE: CPT

## 2023-06-02 PROCEDURE — A9579 GAD-BASE MR CONTRAST NOS,1ML: HCPCS | Performed by: NURSE PRACTITIONER

## 2023-06-02 PROCEDURE — 6360000004 HC RX CONTRAST MEDICATION: Performed by: NURSE PRACTITIONER

## 2023-06-02 RX ADMIN — GADOTERIDOL 12 ML: 279.3 INJECTION, SOLUTION INTRAVENOUS at 17:37

## 2023-06-05 ENCOUNTER — TELEPHONE (OUTPATIENT)
Age: 58
End: 2023-06-05

## 2023-06-12 DIAGNOSIS — G35 MULTIPLE SCLEROSIS (HCC): ICD-10-CM

## 2023-06-12 DIAGNOSIS — F32.1 MAJOR DEPRESSIVE DISORDER, SINGLE EPISODE, MODERATE (HCC): ICD-10-CM

## 2023-06-28 ENCOUNTER — TELEPHONE (OUTPATIENT)
Age: 58
End: 2023-06-28

## 2023-07-18 ENCOUNTER — TELEPHONE (OUTPATIENT)
Age: 58
End: 2023-07-18

## 2023-07-18 NOTE — TELEPHONE ENCOUNTER
Irene Su is calling to make [facility] aware of the patients newly admission.     Please contact with any questions

## 2023-07-25 ENCOUNTER — TELEPHONE (OUTPATIENT)
Age: 58
End: 2023-07-25

## 2023-07-25 DIAGNOSIS — R41.840 ATTENTION AND CONCENTRATION DEFICIT: ICD-10-CM

## 2023-07-25 RX ORDER — DEXTROAMPHETAMINE SACCHARATE, AMPHETAMINE ASPARTATE, DEXTROAMPHETAMINE SULFATE AND AMPHETAMINE SULFATE 2.5; 2.5; 2.5; 2.5 MG/1; MG/1; MG/1; MG/1
10 TABLET ORAL 2 TIMES DAILY
Qty: 60 TABLET | Refills: 0 | Status: SHIPPED | OUTPATIENT
Start: 2023-07-25 | End: 2023-08-24

## 2023-08-15 ENCOUNTER — TELEMEDICINE (OUTPATIENT)
Age: 58
End: 2023-08-15
Payer: MEDICAID

## 2023-08-15 DIAGNOSIS — G35 MULTIPLE SCLEROSIS (HCC): Primary | ICD-10-CM

## 2023-08-15 DIAGNOSIS — R41.840 ATTENTION AND CONCENTRATION DEFICIT: ICD-10-CM

## 2023-08-15 DIAGNOSIS — F32.1 MAJOR DEPRESSIVE DISORDER, SINGLE EPISODE, MODERATE (HCC): ICD-10-CM

## 2023-08-15 DIAGNOSIS — F41.1 GENERALIZED ANXIETY DISORDER: ICD-10-CM

## 2023-08-15 DIAGNOSIS — G25.81 RESTLESS LEGS SYNDROME: ICD-10-CM

## 2023-08-15 PROCEDURE — 99214 OFFICE O/P EST MOD 30 MIN: CPT | Performed by: NURSE PRACTITIONER

## 2023-08-15 RX ORDER — DEXTROAMPHETAMINE SACCHARATE, AMPHETAMINE ASPARTATE, DEXTROAMPHETAMINE SULFATE AND AMPHETAMINE SULFATE 2.5; 2.5; 2.5; 2.5 MG/1; MG/1; MG/1; MG/1
10 TABLET ORAL 2 TIMES DAILY
Qty: 60 TABLET | Refills: 0 | Status: SHIPPED | OUTPATIENT
Start: 2023-08-15 | End: 2023-09-14

## 2023-08-15 NOTE — PROGRESS NOTES
[address] [facility]  [address] [address] [place] [phone] Office   [phone] Fax           Date:  08/15/23     Name:  Yao Dunaway  :  1965  MRN:  [id number]     PCP:  None None (Inactive)    Yao Dunaway is a 63 y.o. female who was seen by synchronous (real-time) audio-video technology on 8/15/2023 for Multiple Sclerosis and Other (RLS, anxiety)    Subjective:   Since her last visit, she was admitted to [facility] due to chest pain. She was told that she did have some elevated enzymes. They admitted her and she did have a cardiac cath which was normal. Ultimately, it was determined that she had a kidney stone with referred pain. Other than this, she has been doing well. She is planning to move in with an old friend whose home is close her previous mother in law with whom she is still close. At her last visit, she was to have IV solumedrol for a possible exacerbation. Unfortunately, she was never able to get this set up but started feeling better without it. Baseline, she has some mild weakness, fatigue, and mild MS hug.    Recap from LV:  RRMS which was treated with Lemtrada. She has now graduated from the REMS program. Last infusion was in 2017. Overall, she has done well. She still has her usual baseline symptoms which are not changed. She is managing in terms of pain on gabapentin, Elavil, and Cymbalta. Anxiety and depression are better but she is having some increased anxiety due to financial issues, food insecurity, and issues with heat and hot water at the house. She still has not been approved for disability so she does not have any personal income and her daughter is now working full time so she is home with the grandchildren by herself. This is all situational. She does better when she has the Adderall in terms of the fatigue and attention deficit.  Will try

## 2023-08-25 ENCOUNTER — TELEPHONE (OUTPATIENT)
Age: 58
End: 2023-08-25

## 2023-08-25 DIAGNOSIS — F41.1 GENERALIZED ANXIETY DISORDER: ICD-10-CM

## 2023-08-28 RX ORDER — DULOXETIN HYDROCHLORIDE 60 MG/1
60 CAPSULE, DELAYED RELEASE ORAL DAILY
Qty: 30 CAPSULE | Refills: 3 | Status: SHIPPED | OUTPATIENT
Start: 2023-08-28

## 2023-08-29 NOTE — TELEPHONE ENCOUNTER
Returned her call. Her medication went to a different pharmacy. She is aware and will go and pick it up.

## 2023-09-27 DIAGNOSIS — G25.81 RESTLESS LEGS SYNDROME: ICD-10-CM

## 2023-09-27 DIAGNOSIS — R41.840 ATTENTION AND CONCENTRATION DEFICIT: Primary | ICD-10-CM

## 2023-09-28 RX ORDER — DEXTROAMPHETAMINE SACCHARATE, AMPHETAMINE ASPARTATE, DEXTROAMPHETAMINE SULFATE AND AMPHETAMINE SULFATE 2.5; 2.5; 2.5; 2.5 MG/1; MG/1; MG/1; MG/1
10 TABLET ORAL DAILY
Qty: 30 TABLET | Refills: 0 | Status: SHIPPED | OUTPATIENT
Start: 2023-11-26 | End: 2023-12-26

## 2023-09-28 RX ORDER — GABAPENTIN 600 MG/1
1200 TABLET ORAL 3 TIMES DAILY
Qty: 540 TABLET | Refills: 1 | Status: SHIPPED | OUTPATIENT
Start: 2023-09-28 | End: 2024-12-26

## 2023-09-28 RX ORDER — DEXTROAMPHETAMINE SACCHARATE, AMPHETAMINE ASPARTATE, DEXTROAMPHETAMINE SULFATE AND AMPHETAMINE SULFATE 2.5; 2.5; 2.5; 2.5 MG/1; MG/1; MG/1; MG/1
10 TABLET ORAL DAILY
Qty: 30 TABLET | Refills: 0 | Status: SHIPPED | OUTPATIENT
Start: 2023-10-27 | End: 2023-11-26

## 2023-09-28 RX ORDER — DEXTROAMPHETAMINE SACCHARATE, AMPHETAMINE ASPARTATE, DEXTROAMPHETAMINE SULFATE AND AMPHETAMINE SULFATE 2.5; 2.5; 2.5; 2.5 MG/1; MG/1; MG/1; MG/1
10 TABLET ORAL DAILY
Qty: 30 TABLET | Refills: 0 | Status: SHIPPED | OUTPATIENT
Start: 2023-09-28 | End: 2023-10-28

## 2023-11-03 DIAGNOSIS — R41.840 ATTENTION AND CONCENTRATION DEFICIT: ICD-10-CM

## 2023-11-03 RX ORDER — DEXTROAMPHETAMINE SACCHARATE, AMPHETAMINE ASPARTATE, DEXTROAMPHETAMINE SULFATE AND AMPHETAMINE SULFATE 2.5; 2.5; 2.5; 2.5 MG/1; MG/1; MG/1; MG/1
10 TABLET ORAL DAILY
Qty: 30 TABLET | Refills: 0 | Status: SHIPPED | OUTPATIENT
Start: 2023-11-03 | End: 2023-12-03

## 2023-12-04 ENCOUNTER — TELEPHONE (OUTPATIENT)
Age: 58
End: 2023-12-04

## 2023-12-04 DIAGNOSIS — R41.840 ATTENTION AND CONCENTRATION DEFICIT: ICD-10-CM

## 2023-12-04 NOTE — TELEPHONE ENCOUNTER
Patient requesting a medication refill on the ADDERALL 10MG tablets to be sent to [facility] on [address].

## 2023-12-05 RX ORDER — DEXTROAMPHETAMINE SACCHARATE, AMPHETAMINE ASPARTATE, DEXTROAMPHETAMINE SULFATE AND AMPHETAMINE SULFATE 2.5; 2.5; 2.5; 2.5 MG/1; MG/1; MG/1; MG/1
10 TABLET ORAL DAILY
Qty: 30 TABLET | Refills: 0 | Status: SHIPPED | OUTPATIENT
Start: 2023-12-05 | End: 2024-01-04

## 2024-01-02 ENCOUNTER — TELEPHONE (OUTPATIENT)
Age: 59
End: 2024-01-02

## 2024-01-02 DIAGNOSIS — R41.840 ATTENTION AND CONCENTRATION DEFICIT: ICD-10-CM

## 2024-01-02 RX ORDER — DEXTROAMPHETAMINE SACCHARATE, AMPHETAMINE ASPARTATE, DEXTROAMPHETAMINE SULFATE AND AMPHETAMINE SULFATE 2.5; 2.5; 2.5; 2.5 MG/1; MG/1; MG/1; MG/1
10 TABLET ORAL DAILY
Qty: 30 TABLET | Refills: 0 | Status: SHIPPED | OUTPATIENT
Start: 2024-01-02 | End: 2024-02-01

## 2024-01-31 ENCOUNTER — OFFICE VISIT (OUTPATIENT)
Age: 59
End: 2024-01-31
Payer: MEDICAID

## 2024-01-31 VITALS
DIASTOLIC BLOOD PRESSURE: 74 MMHG | HEART RATE: 74 BPM | SYSTOLIC BLOOD PRESSURE: 128 MMHG | RESPIRATION RATE: 20 BRPM | OXYGEN SATURATION: 98 %

## 2024-01-31 DIAGNOSIS — R41.840 ATTENTION AND CONCENTRATION DEFICIT: ICD-10-CM

## 2024-01-31 DIAGNOSIS — G25.81 RESTLESS LEGS SYNDROME: ICD-10-CM

## 2024-01-31 DIAGNOSIS — G89.4 CHRONIC PAIN SYNDROME: ICD-10-CM

## 2024-01-31 DIAGNOSIS — R63.4 WEIGHT LOSS, UNINTENTIONAL: ICD-10-CM

## 2024-01-31 DIAGNOSIS — L65.9 HAIR LOSS: ICD-10-CM

## 2024-01-31 DIAGNOSIS — G35 MULTIPLE SCLEROSIS (HCC): ICD-10-CM

## 2024-01-31 DIAGNOSIS — F41.1 GENERALIZED ANXIETY DISORDER: ICD-10-CM

## 2024-01-31 DIAGNOSIS — G35 MULTIPLE SCLEROSIS (HCC): Primary | ICD-10-CM

## 2024-01-31 PROCEDURE — 99214 OFFICE O/P EST MOD 30 MIN: CPT | Performed by: NURSE PRACTITIONER

## 2024-02-05 DIAGNOSIS — R41.840 ATTENTION AND CONCENTRATION DEFICIT: ICD-10-CM

## 2024-02-06 LAB
ALBUMIN SERPL-MCNC: 4.3 G/DL (ref 3.8–4.9)
ALBUMIN/GLOB SERPL: 2.4 {RATIO} (ref 1.2–2.2)
ALP SERPL-CCNC: 117 IU/L (ref 44–121)
ALT SERPL-CCNC: 9 IU/L (ref 0–32)
AST SERPL-CCNC: 11 IU/L (ref 0–40)
BASOPHILS # BLD AUTO: 0 X10E3/UL (ref 0–0.2)
BASOPHILS NFR BLD AUTO: 1 %
BILIRUB SERPL-MCNC: <0.2 MG/DL (ref 0–1.2)
BUN SERPL-MCNC: 8 MG/DL (ref 6–24)
BUN/CREAT SERPL: 12 (ref 9–23)
CALCIUM SERPL-MCNC: 9.5 MG/DL (ref 8.7–10.2)
CHLORIDE SERPL-SCNC: 104 MMOL/L (ref 96–106)
CO2 SERPL-SCNC: 25 MMOL/L (ref 20–29)
CREAT SERPL-MCNC: 0.65 MG/DL (ref 0.57–1)
EGFRCR SERPLBLD CKD-EPI 2021: 103 ML/MIN/1.73
EOSINOPHIL # BLD AUTO: 0.1 X10E3/UL (ref 0–0.4)
EOSINOPHIL NFR BLD AUTO: 2 %
ERYTHROCYTE [DISTWIDTH] IN BLOOD BY AUTOMATED COUNT: 13.4 % (ref 11.7–15.4)
GLOBULIN SER CALC-MCNC: 1.8 G/DL (ref 1.5–4.5)
GLUCOSE SERPL-MCNC: 58 MG/DL (ref 70–99)
HCT VFR BLD AUTO: 44.5 % (ref 34–46.6)
HGB BLD-MCNC: 14.7 G/DL (ref 11.1–15.9)
IMM GRANULOCYTES # BLD AUTO: 0 X10E3/UL (ref 0–0.1)
IMM GRANULOCYTES NFR BLD AUTO: 0 %
LYMPHOCYTES # BLD AUTO: 1.7 X10E3/UL (ref 0.7–3.1)
LYMPHOCYTES NFR BLD AUTO: 28 %
MCH RBC QN AUTO: 31.5 PG (ref 26.6–33)
MCHC RBC AUTO-ENTMCNC: 33 G/DL (ref 31.5–35.7)
MCV RBC AUTO: 96 FL (ref 79–97)
MONOCYTES # BLD AUTO: 0.4 X10E3/UL (ref 0.1–0.9)
MONOCYTES NFR BLD AUTO: 7 %
NEUTROPHILS # BLD AUTO: 3.7 X10E3/UL (ref 1.4–7)
NEUTROPHILS NFR BLD AUTO: 62 %
PLATELET # BLD AUTO: 230 X10E3/UL (ref 150–450)
POTASSIUM SERPL-SCNC: 4 MMOL/L (ref 3.5–5.2)
PROT SERPL-MCNC: 6.1 G/DL (ref 6–8.5)
RBC # BLD AUTO: 4.66 X10E6/UL (ref 3.77–5.28)
SODIUM SERPL-SCNC: 146 MMOL/L (ref 134–144)
T4 FREE SERPL-MCNC: 1.1 NG/DL (ref 0.82–1.77)
TSH SERPL DL<=0.005 MIU/L-ACNC: 0.82 UIU/ML (ref 0.45–4.5)
WBC # BLD AUTO: 5.8 X10E3/UL (ref 3.4–10.8)

## 2024-02-06 RX ORDER — DEXTROAMPHETAMINE SACCHARATE, AMPHETAMINE ASPARTATE, DEXTROAMPHETAMINE SULFATE AND AMPHETAMINE SULFATE 2.5; 2.5; 2.5; 2.5 MG/1; MG/1; MG/1; MG/1
10 TABLET ORAL DAILY
Qty: 30 TABLET | Refills: 0 | Status: SHIPPED | OUTPATIENT
Start: 2024-02-06 | End: 2024-03-07

## 2024-02-22 DIAGNOSIS — G25.81 RESTLESS LEGS SYNDROME: ICD-10-CM

## 2024-02-26 RX ORDER — GABAPENTIN 600 MG/1
TABLET ORAL
Qty: 180 TABLET | Refills: 1 | Status: SHIPPED | OUTPATIENT
Start: 2024-02-26 | End: 2024-08-24

## 2024-03-05 ENCOUNTER — TELEPHONE (OUTPATIENT)
Age: 59
End: 2024-03-05

## 2024-03-18 DIAGNOSIS — R41.840 ATTENTION AND CONCENTRATION DEFICIT: ICD-10-CM

## 2024-03-18 RX ORDER — DEXTROAMPHETAMINE SACCHARATE, AMPHETAMINE ASPARTATE, DEXTROAMPHETAMINE SULFATE AND AMPHETAMINE SULFATE 2.5; 2.5; 2.5; 2.5 MG/1; MG/1; MG/1; MG/1
10 TABLET ORAL DAILY
Qty: 30 TABLET | Refills: 0 | Status: SHIPPED | OUTPATIENT
Start: 2024-03-18 | End: 2024-04-17

## 2024-04-17 ENCOUNTER — TELEPHONE (OUTPATIENT)
Age: 59
End: 2024-04-17

## 2024-04-17 NOTE — TELEPHONE ENCOUNTER
Patient is requesting a refill on her Adderall.     Is also looking to see if she can get a call to discuss her blood work.     Please advise.

## 2024-04-22 DIAGNOSIS — R41.840 ATTENTION AND CONCENTRATION DEFICIT: ICD-10-CM

## 2024-04-22 RX ORDER — DEXTROAMPHETAMINE SACCHARATE, AMPHETAMINE ASPARTATE, DEXTROAMPHETAMINE SULFATE AND AMPHETAMINE SULFATE 2.5; 2.5; 2.5; 2.5 MG/1; MG/1; MG/1; MG/1
10 TABLET ORAL DAILY
Qty: 30 TABLET | Refills: 0 | Status: SHIPPED | OUTPATIENT
Start: 2024-04-22 | End: 2024-05-22

## 2024-05-01 ENCOUNTER — TELEMEDICINE (OUTPATIENT)
Age: 59
End: 2024-05-01
Payer: MEDICAID

## 2024-05-01 DIAGNOSIS — F32.1 MAJOR DEPRESSIVE DISORDER, SINGLE EPISODE, MODERATE (HCC): ICD-10-CM

## 2024-05-01 DIAGNOSIS — R41.840 ATTENTION AND CONCENTRATION DEFICIT: ICD-10-CM

## 2024-05-01 DIAGNOSIS — F41.1 GENERALIZED ANXIETY DISORDER: ICD-10-CM

## 2024-05-01 DIAGNOSIS — G35 MULTIPLE SCLEROSIS (HCC): Primary | ICD-10-CM

## 2024-05-01 DIAGNOSIS — R13.10 DYSPHAGIA, UNSPECIFIED TYPE: ICD-10-CM

## 2024-05-01 DIAGNOSIS — G25.81 RESTLESS LEGS SYNDROME: ICD-10-CM

## 2024-05-01 DIAGNOSIS — G89.4 CHRONIC PAIN SYNDROME: ICD-10-CM

## 2024-05-01 PROCEDURE — 99214 OFFICE O/P EST MOD 30 MIN: CPT | Performed by: NURSE PRACTITIONER

## 2024-05-01 RX ORDER — AMITRIPTYLINE HYDROCHLORIDE 100 MG/1
100 TABLET ORAL NIGHTLY
Qty: 30 TABLET | Refills: 3 | Status: SHIPPED | OUTPATIENT
Start: 2024-05-01

## 2024-05-01 NOTE — PROGRESS NOTES
[facility]  NEUROLOGY CLINIC   [address] [facility] [address] [place] [address]   [phone] Office   [phone] Fax           Date:  24     Name:  CARMEN ZEE  :  1965  MRN:  [id number]     PCP:  None, None (Inactive)    Carmen Zee is a 59 y.o. female who was seen by synchronous (real-time) audio-video technology on 2024 for Multiple Sclerosis    Subjective:   She indicates that she is hungry and wants to eat but when she does seems to have issues swallowing it. She states that it seems like the food turns into sand and makes her gag. It does not occur all the time and not with all foods. Liquids tend not to be an issue. Texture and consistency seems to be playing a role in the issues with swallowing. She has had issues with taking the Cymbalta. The gabapentin she has had to cut up to take it. About a month ago, she did rupture her left ear drum. She has not been able to hear out of it. Her PCP indicated to her that the infection caused her eardrum to burst. Her balance has been a little worse.     CollectedUpdatedProcedure  2024 0741  T4, Free [[id number]]    Blood   Component Value Units   T4 Free 1.10 ng/dL   2024 0638  TSH [[id number]]    Blood   Component Value Units   TSH 0.817 uIU/mL   2024 0638  Comprehensive Metabolic Panel [[id number]]    (Abnormal)   Blood   Component Value Units   Glucose 58 Low  mg/dL   BUN 8 mg/dL   Creatinine 0.65 mg/dL   Est, Glom Filt Rate 103 mL/min/1.73   BUN/Creatinine Ratio 12    Sodium 146 High  mmol/L   Potassium 4.0 mmol/L   Chloride 104 mmol/L   CO2 25 mmol/L   Calcium 9.5 mg/dL   Total Protein 6.1 g/dL   Albumin 4.3 g/dL   Globulin, Total 1.8 g/dL   Albumin/Globulin Ratio 2.4 High     Total Bilirubin <0.2 mg/dL   Alkaline Phosphatase 117 IU/L   AST 11 IU/L   ALT 9 IU/L   2024 [id number]/2024 0638  CBC with

## 2024-05-15 DIAGNOSIS — G35 MULTIPLE SCLEROSIS (HCC): ICD-10-CM

## 2024-05-15 DIAGNOSIS — F41.1 GENERALIZED ANXIETY DISORDER: ICD-10-CM

## 2024-05-15 DIAGNOSIS — F32.1 MAJOR DEPRESSIVE DISORDER, SINGLE EPISODE, MODERATE (HCC): ICD-10-CM

## 2024-05-17 RX ORDER — AMITRIPTYLINE HYDROCHLORIDE 100 MG/1
100 TABLET ORAL NIGHTLY
Qty: 90 TABLET | Refills: 2 | Status: SHIPPED | OUTPATIENT
Start: 2024-05-17

## 2024-05-21 DIAGNOSIS — R41.840 ATTENTION AND CONCENTRATION DEFICIT: ICD-10-CM

## 2024-05-21 RX ORDER — DEXTROAMPHETAMINE SACCHARATE, AMPHETAMINE ASPARTATE, DEXTROAMPHETAMINE SULFATE AND AMPHETAMINE SULFATE 2.5; 2.5; 2.5; 2.5 MG/1; MG/1; MG/1; MG/1
10 TABLET ORAL DAILY
Qty: 30 TABLET | Refills: 0 | Status: SHIPPED | OUTPATIENT
Start: 2024-07-20 | End: 2024-08-19

## 2024-05-21 RX ORDER — DEXTROAMPHETAMINE SACCHARATE, AMPHETAMINE ASPARTATE, DEXTROAMPHETAMINE SULFATE AND AMPHETAMINE SULFATE 2.5; 2.5; 2.5; 2.5 MG/1; MG/1; MG/1; MG/1
10 TABLET ORAL DAILY
Qty: 30 TABLET | Refills: 0 | Status: SHIPPED | OUTPATIENT
Start: 2024-05-21 | End: 2024-06-20

## 2024-05-21 RX ORDER — DEXTROAMPHETAMINE SACCHARATE, AMPHETAMINE ASPARTATE, DEXTROAMPHETAMINE SULFATE AND AMPHETAMINE SULFATE 2.5; 2.5; 2.5; 2.5 MG/1; MG/1; MG/1; MG/1
10 TABLET ORAL DAILY
Qty: 30 TABLET | Refills: 0 | Status: SHIPPED | OUTPATIENT
Start: 2024-06-20 | End: 2024-07-20

## 2024-06-21 ENCOUNTER — TELEPHONE (OUTPATIENT)
Age: 59
End: 2024-06-21

## 2024-06-21 DIAGNOSIS — R13.10 DYSPHAGIA, UNSPECIFIED TYPE: Primary | ICD-10-CM

## 2024-06-26 ENCOUNTER — TELEPHONE (OUTPATIENT)
Age: 59
End: 2024-06-26

## 2024-06-26 NOTE — TELEPHONE ENCOUNTER
Returned her call. Advised she should have another 2 months worth of her medication on hold at the pharmacy.   She will call and ask and let me know if you she has an issues.

## 2024-07-02 ENCOUNTER — TELEPHONE (OUTPATIENT)
Age: 59
End: 2024-07-02

## 2024-07-02 NOTE — TELEPHONE ENCOUNTER
[facility] called about the referral they received for Speech Therapy. They need additional information for the patient.    They  need the last office visit notes to be faxed.    Fax number - [phone]

## 2024-07-31 ENCOUNTER — TELEMEDICINE (OUTPATIENT)
Age: 59
End: 2024-07-31
Payer: MEDICAID

## 2024-07-31 ENCOUNTER — TELEPHONE (OUTPATIENT)
Age: 59
End: 2024-07-31

## 2024-07-31 DIAGNOSIS — F32.1 MAJOR DEPRESSIVE DISORDER, SINGLE EPISODE, MODERATE (HCC): ICD-10-CM

## 2024-07-31 DIAGNOSIS — G35 MULTIPLE SCLEROSIS (HCC): Primary | ICD-10-CM

## 2024-07-31 DIAGNOSIS — G25.81 RESTLESS LEGS SYNDROME: ICD-10-CM

## 2024-07-31 DIAGNOSIS — F41.1 GENERALIZED ANXIETY DISORDER: ICD-10-CM

## 2024-07-31 DIAGNOSIS — F45.42 PAIN DISORDER WITH RELATED PSYCHOLOGICAL FACTORS: ICD-10-CM

## 2024-07-31 DIAGNOSIS — R41.840 ATTENTION AND CONCENTRATION DEFICIT: ICD-10-CM

## 2024-07-31 PROCEDURE — 99214 OFFICE O/P EST MOD 30 MIN: CPT | Performed by: NURSE PRACTITIONER

## 2024-07-31 RX ORDER — DULOXETIN HYDROCHLORIDE 30 MG/1
30 CAPSULE, DELAYED RELEASE ORAL DAILY
Qty: 7 CAPSULE | Refills: 0 | Status: SHIPPED | OUTPATIENT
Start: 2024-07-31 | End: 2024-08-07

## 2024-07-31 RX ORDER — DULOXETIN HYDROCHLORIDE 60 MG/1
60 CAPSULE, DELAYED RELEASE ORAL DAILY
Qty: 30 CAPSULE | Refills: 5 | Status: SHIPPED | OUTPATIENT
Start: 2024-07-31

## 2024-07-31 NOTE — PROGRESS NOTES
5. Restless legs syndrome        6. Pain disorder with related psychological factors          Ongoing issues with swallowing which is variable and seems related to texture and consistency. Swallowing study was normal and it was felt that her issues might be more psychiatric. Of note, she did discontinue both the Elavil and the Cymbalta which may account for some of the edginess she is reporting today. Will restart the Cymbalta today. She will call to set up her MRI of the brain to assess for any cause for the swallowing issues and serial imaging as it relates to her RRMS. Otherwise, her baseline symptoms are about the same. RLS are manageable with the gabapentin despite not having the Elavil. Fatigue and ADD are manageable with the Adderall. Follow up in eight weeks.    We discussed the expected course, resolution and complications of the diagnosis(es) in detail.  Medication risks, benefits, costs, interactions, and alternatives were discussed as indicated.  I advised her to contact the office if her condition worsens, changes or fails to improve as anticipated. She expressed understanding with the diagnosis(es) and plan.     Meghan Hamlin, was evaluated through a synchronous (real-time) audio-video encounter. The patient (or guardian if applicable) is aware that this is a billable service, which includes applicable co-pays. This Virtual Visit was conducted with patient's (and/or legal guardian's) consent. Patient identification was verified, and a caregiver was present when appropriate.   The patient was located at Home: [address] [place] [id number]  Provider was located at Home (Appt Dept State): [place]  Confirm you are appropriately licensed, registered, or certified to deliver care in the state where the patient is located as indicated above. If you are not or unsure, please re-schedule the visit: Yes, I confirm.     PARDEEP Euceda NP

## 2024-07-31 NOTE — TELEPHONE ENCOUNTER
Sw patient and advised that Yanni is running a little late. She said understood and stated that she would wait for the call

## 2024-08-20 DIAGNOSIS — G25.81 RESTLESS LEGS SYNDROME: ICD-10-CM

## 2024-08-22 RX ORDER — GABAPENTIN 600 MG/1
TABLET ORAL
Qty: 180 TABLET | Refills: 1 | Status: SHIPPED | OUTPATIENT
Start: 2024-08-22 | End: 2025-02-18

## 2024-08-26 DIAGNOSIS — F41.1 GENERALIZED ANXIETY DISORDER: ICD-10-CM

## 2024-08-26 DIAGNOSIS — F32.1 MAJOR DEPRESSIVE DISORDER, SINGLE EPISODE, MODERATE (HCC): ICD-10-CM

## 2024-08-27 NOTE — PROGRESS NOTES
[address] [facility],[address]  [address]   [address] [facility] [address] [place] [address] DONOVAN Kerrbeau [place].   [phone] Office   [phone] Fax           Date:  20     Name:  Misael Wick  :  1965  MRN:  [id number]     PCP:  None    Chief Complaint   Patient presents with    Multiple Sclerosis     HISTORY OF PRESENT ILLNESS: Follow up for RRMS. At baseline, she does have chronic diffuse joint pain related to arthritic changes. She does mention today that her left shoulder is bothering her more than normal.  She thinks this may be related to taking care of her grandchildren in particular her grandson Amado Barrientos. He is about 60 pounds now and when he has 1 of his temper tantrums if she is holding his hand he will suddenly go limp. She thinks that this occurred when she noticed the left shoulder bothering her more that she had a 760 pound weight pulling her down. This is been going on for about 2 weeks. She otherwise continues to have baseline chronic diffuse pain, MS hug. This is actually pretty well managed between the oxycodone, amitriptyline, and gabapentin. Since she changed the gabapentin to 4 times daily, she is tolerating that significantly better. She continues with the Cymbalta which has helped her to feel a little less angry. Anxiety is stable. Memory and concentration are still an issue though the Adderall seems to help with the fatigue and concentration. Her last Lemtrada dose was in 2017. She is compliant with the REMS program.  No new neurological symptoms or exacerbation. She denies having any significant issues with her bowel or bladder. No changes in her hearing or vision. Recap from her last office visit:  1. MS (multiple sclerosis) ([facility] [address]    2. Chronic pain syndrome  -     gabapentin (NEURONTIN) 300 mg capsule; Take 2 Caps by mouth four (4) times daily.  Max Daily Amount: 2,400 mg.  -     oxyCODONE IR (ROXICODONE) 10 mg tab immediate release tablet; Take 1 Tab by mouth every four (4) hours as needed for Pain for up to 30 days. Max Daily Amount: 60 mg.    3. Attention deficit disorder (ADD) without hyperactivity  -     dextroamphetamine-amphetamine (ADDERALL) 10 mg tablet; Take 1 Tab by mouth two (2) times a day. Max Daily Amount: 20 mg.    4. Restless leg syndrome      While the gabapentin does help, she is not able to tolerate the higher doses and it does not seem to last as long as she may need. Suggested that she try taking 600mg, which seems to be as much as she can tolerate at one time, four times a day and continue to supplement with the Roxicodone. If she tolerates this better but still has increased pain, then we will add a 600mg Horizant to supplement the gabapentin she takes during the day. This may also help with the restless leg symptoms that occur primarily at night but have been noted to be present in the day as well occasionally. For the most part her anxiety is well managed on Cymbalta. ADD and fatigue are manageable with the adderall. Follow up in three months. Except as noted above, denies  fever, chills, cough. No CP or SOB. No Weight loss. Appetite good. Sleeping well. No sweats. No edema. No bruising or bleeding. No nausea or vomit. No diarrhea. No frequency, urgency, No depressive sxs. No anxiety. Denies sore throat, nasal congestion, nasal discharge, epistaxis, tinnitus, hearing loss, back pain, muscle pain       Current Outpatient Medications   Medication Sig    dextroamphetamine-amphetamine (ADDERALL) 10 mg tablet Take 1 Tab by mouth two (2) times a day. Max Daily Amount: 20 mg.    oxyCODONE IR (ROXICODONE) 10 mg tab immediate release tablet Take 1 Tab by mouth every four (4) hours as needed for Pain for up to 30 days.  Max Daily Amount: 60 mg.    DULoxetine (CYMBALTA) 60 mg capsule TAKE ONE CAPSULE BY MOUTH DAILY    amitriptyline (ELAVIL) 100 mg tablet TAKE ONE TABLET BY MOUTH ONCE NIGHTLY    gabapentin (NEURONTIN) 300 mg capsule Take 2 Caps by mouth four (4) times daily. Max Daily Amount: 2,400 mg. No current facility-administered medications for this visit.       No Known Allergies  Past Medical History:   Diagnosis Date    Delivery normal     x2    Fibromyalgia     Headache(784.0)     Migraine headache     Multiple sclerosis (HCC)     Sarcoidosis     Sun-damaged skin     Tanning bed exposure      Past Surgical History:   Procedure Laterality Date    ABDOMEN SURGERY PROC UNLISTED      galbladder removal    HX CHOLECYSTECTOMY  1990    HX GYN      tubal ligation    HX GYN      tubial     HX OTHER SURGICAL      tubal ligation    HX TUBAL LIGATION  1994     Social History     Socioeconomic History    Marital status: SINGLE     Spouse name: Not on file    Number of children: Not on file    Years of education: Not on file    Highest education level: Not on file   Occupational History    Not on file   Social Needs    Financial resource strain: Not on file    Food insecurity     Worry: Not on file     Inability: Not on file    Transportation needs     Medical: Not on file     Non-medical: Not on file   Tobacco Use    Smoking status: Current Every Day Smoker     Packs/day: 1.00     Years: 30.00     Pack years: 30.00     Types: Cigarettes    Smokeless tobacco: Never Used   Substance and Sexual Activity    Alcohol use: No    Drug use: Yes     Types: Marijuana    Sexual activity: Yes     Partners: Male     Birth control/protection: Surgical   Lifestyle    Physical activity     Days per week: Not on file     Minutes per session: Not on file    Stress: Not on file   Relationships    Social connections     Talks on phone: Not on file     Gets together: Not on file     Attends Zoroastrianism service: Not on file     Active member of club or organization: Not on file     Attends meetings of clubs or organizations: Not on file     Relationship status: Not on file    Intimate partner violence     Fear of current or ex partner: Not on file     Emotionally abused: Not on file     Physically abused: Not on file     Forced sexual activity: Not on file   Other Topics Concern    Not on file   Social History Narrative    ** Merged History Encounter **          Family History   Problem Relation Age of Onset    Thyroid Disease Sister     Thyroid Disease Sister     Other Mother         Jen zambranoet's disease 62    Heart Disease Father     Cancer Father         lymphoma       PHYSICAL EXAMINATION:    Visit Vitals  /80   Pulse 70   Temp (!) 94.7 °F (34.8 °C)   Resp 17   Ht 5' 6\" (1.676 m)   Wt 69.9 kg (154 lb)   SpO2 97%   BMI 24.86 kg/m²     General: Well defined, nourished, and groomed individual in no acute distress.    Neck: Supple, nontender, no bruits    Heart: Regular rate and rhythm    Lungs: Clear to auscultation bilaterally    Musculoskeletal: Extremities revealed no edema and had full range of motion of joints. Positive right Finkelstein   Psych: Good mood and bright affect    NEUROLOGICAL EXAMINATION:    Mental Status: Alert and oriented to person, place, and time with recent and remote memory intact.    Cranial Nerves:    II, III, IV, VI: Visual acuity grossly intact. Visual fields are normal.    Pupils are equal, round, and reactive to light and accommodation.    Extra-ocular movements are full and fluid. Fundoscopic exam was benign, no ptosis or nystagmus.    V-XII: Hearing is grossly intact. Facial features are symmetric, with normal sensation and strength. The palate rises symmetrically and the tongue protrudes midline. Sternocleidomastoids 5/5.    Motor Examination: Normal tone, bulk, very trace weakness noted in the left upper extremity with extension, left lower extremity weakness in the hip flexor and extension, decreased left dorsiflexion.    Coordination: No resting or intention tremor    Gait and Station: Unsteady while walking today favoring the left leg. There is an antalgic component to this. No muscle wasting or fasiculations noted.    Reflexes: DTRs 2+ throughout. ASSESSMENT AND PLAN    ICD-10-CM ICD-9-CM    1. MS (multiple sclerosis) ([facility] [address]  G35 340 MRI BRAIN WO CONT   2. Restless leg syndrome  G25.81 333.94    3. Chronic pain syndrome  G89.4 338.4    4. Attention deficit disorder (ADD) without hyperactivity  F98.8 314.00    5. Moderate anxiety  F41.9 300.00    6. Moderate major depression (HCC)  F32.1 296.22    7. Strain of left shoulder, initial encounter  S46.912A 840.9 predniSONE (DELTASONE) 10 mg tablet      REFERRAL TO ORTHOPEDICS     The pain she indicates she feels is a little bit worse but this is largely related to the issue in her left shoulder. She does not have any loss of range of motion and in fact movement seems to make it feel better so I tend to think that this is probably more of a strain than anything else. I did provide her with a tapering dose steroid to help decrease the inflammation. As this is a chronic problem that seems to worsen at times, she was referred to orthopedics at [facility]. Otherwise, her pain is largely manageable with her oxycodone, gabapentin, Cymbalta, amitriptyline. For the most part her anxiety is well managed on Cymbalta and she is learning some additional stress management techniques such as using yoga. Increasing the gabapentin may also have some effect on her anxiety as well. With regard to the multiple sclerosis, this does appear to be stable. She continues to have baseline left-sided weakness, MS hug, spasticity, and diffuse pain. She is compliant with the REMS program.  Her last Lemtrada infusion was in September 2017. Last MRI was in November 2019 and she will be due for serial imaging so this was ordered today. Follow-up in 3 months or sooner if needed. Myriam Schafer declines

## 2024-08-29 RX ORDER — DULOXETIN HYDROCHLORIDE 60 MG/1
CAPSULE, DELAYED RELEASE ORAL DAILY
Qty: 90 CAPSULE | Refills: 2 | Status: SHIPPED | OUTPATIENT
Start: 2024-08-29

## 2024-09-05 ENCOUNTER — TELEPHONE (OUTPATIENT)
Age: 59
End: 2024-09-05

## 2024-09-05 DIAGNOSIS — R41.840 ATTENTION AND CONCENTRATION DEFICIT: ICD-10-CM

## 2024-09-09 RX ORDER — DEXTROAMPHETAMINE SACCHARATE, AMPHETAMINE ASPARTATE, DEXTROAMPHETAMINE SULFATE AND AMPHETAMINE SULFATE 2.5; 2.5; 2.5; 2.5 MG/1; MG/1; MG/1; MG/1
10 TABLET ORAL DAILY
Qty: 30 TABLET | Refills: 0 | Status: SHIPPED | OUTPATIENT
Start: 2024-09-09 | End: 2024-10-09

## 2025-01-14 DIAGNOSIS — G25.81 RESTLESS LEGS SYNDROME: ICD-10-CM

## 2025-01-15 RX ORDER — GABAPENTIN 600 MG/1
TABLET ORAL
Qty: 180 TABLET | Refills: 1 | Status: SHIPPED | OUTPATIENT
Start: 2025-01-15 | End: 2025-07-14

## 2025-01-15 RX ORDER — AMITRIPTYLINE HYDROCHLORIDE 100 MG/1
100 TABLET ORAL NIGHTLY
Qty: 30 TABLET | Refills: 3 | Status: SHIPPED | OUTPATIENT
Start: 2025-01-15

## 2025-02-13 RX ORDER — AMITRIPTYLINE HYDROCHLORIDE 100 MG/1
100 TABLET ORAL NIGHTLY
Qty: 90 TABLET | Refills: 2 | Status: SHIPPED | OUTPATIENT
Start: 2025-02-13

## 2025-04-10 DIAGNOSIS — F41.1 GENERALIZED ANXIETY DISORDER: ICD-10-CM

## 2025-04-10 DIAGNOSIS — F32.1 MAJOR DEPRESSIVE DISORDER, SINGLE EPISODE, MODERATE (HCC): ICD-10-CM

## 2025-04-22 ENCOUNTER — TELEMEDICINE (OUTPATIENT)
Age: 60
End: 2025-04-22
Payer: MEDICARE

## 2025-04-22 DIAGNOSIS — G35 RELAPSING REMITTING MULTIPLE SCLEROSIS (HCC): Primary | ICD-10-CM

## 2025-04-22 DIAGNOSIS — R41.840 ATTENTION AND CONCENTRATION DEFICIT: ICD-10-CM

## 2025-04-22 DIAGNOSIS — G25.81 RESTLESS LEGS SYNDROME: ICD-10-CM

## 2025-04-22 DIAGNOSIS — F41.1 GENERALIZED ANXIETY DISORDER: ICD-10-CM

## 2025-04-22 DIAGNOSIS — F32.1 MAJOR DEPRESSIVE DISORDER, SINGLE EPISODE, MODERATE (HCC): ICD-10-CM

## 2025-04-22 DIAGNOSIS — G89.4 CHRONIC PAIN SYNDROME: ICD-10-CM

## 2025-04-22 PROCEDURE — 3017F COLORECTAL CA SCREEN DOC REV: CPT | Performed by: NURSE PRACTITIONER

## 2025-04-22 PROCEDURE — 99214 OFFICE O/P EST MOD 30 MIN: CPT | Performed by: NURSE PRACTITIONER

## 2025-04-22 PROCEDURE — G8427 DOCREV CUR MEDS BY ELIG CLIN: HCPCS | Performed by: NURSE PRACTITIONER

## 2025-04-22 RX ORDER — AMITRIPTYLINE HYDROCHLORIDE 100 MG/1
100 TABLET ORAL NIGHTLY
Qty: 90 TABLET | Refills: 3 | Status: SHIPPED | OUTPATIENT
Start: 2025-04-22

## 2025-04-22 RX ORDER — DEXTROAMPHETAMINE SACCHARATE, AMPHETAMINE ASPARTATE MONOHYDRATE, DEXTROAMPHETAMINE SULFATE AND AMPHETAMINE SULFATE 2.5; 2.5; 2.5; 2.5 MG/1; MG/1; MG/1; MG/1
10 CAPSULE, EXTENDED RELEASE ORAL DAILY
Qty: 30 CAPSULE | Refills: 0 | Status: SHIPPED | OUTPATIENT
Start: 2025-05-22 | End: 2025-06-21

## 2025-04-22 RX ORDER — DULOXETIN HYDROCHLORIDE 60 MG/1
60 CAPSULE, DELAYED RELEASE ORAL DAILY
Qty: 90 CAPSULE | Refills: 3 | Status: SHIPPED | OUTPATIENT
Start: 2025-04-22

## 2025-04-22 RX ORDER — GABAPENTIN 600 MG/1
1200 TABLET ORAL 3 TIMES DAILY
Qty: 540 TABLET | Refills: 1 | Status: SHIPPED | OUTPATIENT
Start: 2025-04-22 | End: 2025-10-19

## 2025-04-22 RX ORDER — DEXTROAMPHETAMINE SACCHARATE, AMPHETAMINE ASPARTATE MONOHYDRATE, DEXTROAMPHETAMINE SULFATE AND AMPHETAMINE SULFATE 2.5; 2.5; 2.5; 2.5 MG/1; MG/1; MG/1; MG/1
10 CAPSULE, EXTENDED RELEASE ORAL DAILY
Qty: 30 CAPSULE | Refills: 0 | Status: SHIPPED | OUTPATIENT
Start: 2025-06-21 | End: 2025-07-21

## 2025-04-22 RX ORDER — DEXTROAMPHETAMINE SACCHARATE, AMPHETAMINE ASPARTATE MONOHYDRATE, DEXTROAMPHETAMINE SULFATE AND AMPHETAMINE SULFATE 2.5; 2.5; 2.5; 2.5 MG/1; MG/1; MG/1; MG/1
10 CAPSULE, EXTENDED RELEASE ORAL DAILY
Qty: 30 CAPSULE | Refills: 0 | Status: SHIPPED | OUTPATIENT
Start: 2025-04-22 | End: 2025-05-22

## 2025-04-22 NOTE — PROGRESS NOTES
General:  Well defined, nourished, and groomed individual in no acute distress.    Psych:  Good mood and bright affect    NEUROLOGICAL EXAMINATION:     Mental Status:   Alert and oriented to person, place, and time with recent and remote memory intact.  Attention span and concentration are normal. Speech is fluent with a full fund of knowledge.      Cranial Nerves:  I: smell Not tested   II: visual fields Not assessed   II: pupils Equal, round, reactive to light   II: optic disc Not assessed   III,VII: ptosis none   III,IV,VI: extraocular muscles  Full ROM   V: mastication normal   V: facial light touch sensation  Not assessed   VII: facial muscle function   symmetric   VIII: hearing symmetric   IX: soft palate elevation  normal   XI: trapezius strength  Not assessed   XI: sternocleidomastoid strength Not assessed   XI: neck flexion strength  Not assessed   XII: tongue  midline     Motor Examination: Normal tone and bulk.  Strength was not assessed      Sensory exam:  Not assessed     Coordination:  No resting or intention tremor    Gait and Station:  No muscle wasting or fasiculations noted.      Reflexes:  Not assessed    Assessment & Plan:      Diagnosis Orders   1. Relapsing remitting multiple sclerosis (HCC)  MRI BRAIN W WO CONTRAST      2. Major depressive disorder, single episode, moderate (HCC)  DULoxetine (CYMBALTA) 60 MG extended release capsule      3. Generalized anxiety disorder  DULoxetine (CYMBALTA) 60 MG extended release capsule      4. Restless legs syndrome  amitriptyline (ELAVIL) 100 MG tablet    gabapentin (NEURONTIN) 600 MG tablet      5. Attention and concentration deficit  amphetamine-dextroamphetamine (ADDERALL XR) 10 MG extended release capsule    amphetamine-dextroamphetamine (ADDERALL XR) 10 MG extended release capsule    amphetamine-dextroamphetamine (ADDERALL XR) 10 MG extended release capsule      6. Chronic pain syndrome  amitriptyline (ELAVIL) 100 MG tablet    DULoxetine (CYMBALTA)

## 2025-04-29 RX ORDER — DULOXETIN HYDROCHLORIDE 60 MG/1
CAPSULE, DELAYED RELEASE ORAL DAILY
Qty: 90 CAPSULE | Refills: 3 | OUTPATIENT
Start: 2025-04-29